# Patient Record
Sex: FEMALE | Race: WHITE | HISPANIC OR LATINO | Employment: UNEMPLOYED | URBAN - METROPOLITAN AREA
[De-identification: names, ages, dates, MRNs, and addresses within clinical notes are randomized per-mention and may not be internally consistent; named-entity substitution may affect disease eponyms.]

---

## 2018-11-16 ENCOUNTER — HOSPITAL ENCOUNTER (INPATIENT)
Facility: MEDICAL CENTER | Age: 33
LOS: 2 days | DRG: 392 | End: 2018-11-18
Attending: EMERGENCY MEDICINE | Admitting: FAMILY MEDICINE
Payer: COMMERCIAL

## 2018-11-16 ENCOUNTER — APPOINTMENT (OUTPATIENT)
Dept: RADIOLOGY | Facility: MEDICAL CENTER | Age: 33
DRG: 392 | End: 2018-11-16
Attending: EMERGENCY MEDICINE
Payer: COMMERCIAL

## 2018-11-16 DIAGNOSIS — K57.92 DIVERTICULITIS: ICD-10-CM

## 2018-11-16 LAB
ALBUMIN SERPL BCP-MCNC: 4.6 G/DL (ref 3.2–4.9)
ALBUMIN/GLOB SERPL: 1.3 G/DL
ALP SERPL-CCNC: 60 U/L (ref 30–99)
ALT SERPL-CCNC: 15 U/L (ref 2–50)
ANION GAP SERPL CALC-SCNC: 10 MMOL/L (ref 0–11.9)
APPEARANCE UR: ABNORMAL
AST SERPL-CCNC: 13 U/L (ref 12–45)
BACTERIA #/AREA URNS HPF: ABNORMAL /HPF
BASOPHILS # BLD AUTO: 0.4 % (ref 0–1.8)
BASOPHILS # BLD: 0.07 K/UL (ref 0–0.12)
BILIRUB SERPL-MCNC: 1 MG/DL (ref 0.1–1.5)
BILIRUB UR QL STRIP.AUTO: NEGATIVE
BUN SERPL-MCNC: 12 MG/DL (ref 8–22)
CALCIUM SERPL-MCNC: 9.7 MG/DL (ref 8.5–10.5)
CHLORIDE SERPL-SCNC: 103 MMOL/L (ref 96–112)
CO2 SERPL-SCNC: 23 MMOL/L (ref 20–33)
COLOR UR: YELLOW
CREAT SERPL-MCNC: 0.56 MG/DL (ref 0.5–1.4)
EKG IMPRESSION: NORMAL
EOSINOPHIL # BLD AUTO: 0.15 K/UL (ref 0–0.51)
EOSINOPHIL NFR BLD: 0.9 % (ref 0–6.9)
EPI CELLS #/AREA URNS HPF: ABNORMAL /HPF
ERYTHROCYTE [DISTWIDTH] IN BLOOD BY AUTOMATED COUNT: 40.4 FL (ref 35.9–50)
GLOBULIN SER CALC-MCNC: 3.5 G/DL (ref 1.9–3.5)
GLUCOSE SERPL-MCNC: 93 MG/DL (ref 65–99)
GLUCOSE UR STRIP.AUTO-MCNC: NEGATIVE MG/DL
HCG SERPL QL: NEGATIVE
HCT VFR BLD AUTO: 47.1 % (ref 37–47)
HGB BLD-MCNC: 16.4 G/DL (ref 12–16)
HYALINE CASTS #/AREA URNS LPF: ABNORMAL /LPF
IMM GRANULOCYTES # BLD AUTO: 0.11 K/UL (ref 0–0.11)
IMM GRANULOCYTES NFR BLD AUTO: 0.6 % (ref 0–0.9)
KETONES UR STRIP.AUTO-MCNC: NEGATIVE MG/DL
LACTATE BLD-SCNC: 0.9 MMOL/L (ref 0.5–2)
LEUKOCYTE ESTERASE UR QL STRIP.AUTO: NEGATIVE
LIPASE SERPL-CCNC: 37 U/L (ref 11–82)
LYMPHOCYTES # BLD AUTO: 2.54 K/UL (ref 1–4.8)
LYMPHOCYTES NFR BLD: 14.6 % (ref 22–41)
MCH RBC QN AUTO: 30 PG (ref 27–33)
MCHC RBC AUTO-ENTMCNC: 34.8 G/DL (ref 33.6–35)
MCV RBC AUTO: 86.3 FL (ref 81.4–97.8)
MICRO URNS: ABNORMAL
MONOCYTES # BLD AUTO: 0.92 K/UL (ref 0–0.85)
MONOCYTES NFR BLD AUTO: 5.3 % (ref 0–13.4)
NEUTROPHILS # BLD AUTO: 13.62 K/UL (ref 2–7.15)
NEUTROPHILS NFR BLD: 78.2 % (ref 44–72)
NITRITE UR QL STRIP.AUTO: NEGATIVE
NRBC # BLD AUTO: 0 K/UL
NRBC BLD-RTO: 0 /100 WBC
PH UR STRIP.AUTO: 6 [PH]
PLATELET # BLD AUTO: 331 K/UL (ref 164–446)
PMV BLD AUTO: 9.2 FL (ref 9–12.9)
POTASSIUM SERPL-SCNC: 3.8 MMOL/L (ref 3.6–5.5)
PROT SERPL-MCNC: 8.1 G/DL (ref 6–8.2)
PROT UR QL STRIP: NEGATIVE MG/DL
RBC # BLD AUTO: 5.46 M/UL (ref 4.2–5.4)
RBC # URNS HPF: ABNORMAL /HPF
RBC UR QL AUTO: NEGATIVE
SODIUM SERPL-SCNC: 136 MMOL/L (ref 135–145)
SP GR UR STRIP.AUTO: 1.02
UROBILINOGEN UR STRIP.AUTO-MCNC: 0.2 MG/DL
WBC # BLD AUTO: 17.4 K/UL (ref 4.8–10.8)
WBC #/AREA URNS HPF: ABNORMAL /HPF

## 2018-11-16 PROCEDURE — 84703 CHORIONIC GONADOTROPIN ASSAY: CPT

## 2018-11-16 PROCEDURE — 700111 HCHG RX REV CODE 636 W/ 250 OVERRIDE (IP): Performed by: STUDENT IN AN ORGANIZED HEALTH CARE EDUCATION/TRAINING PROGRAM

## 2018-11-16 PROCEDURE — 85025 COMPLETE CBC W/AUTO DIFF WBC: CPT

## 2018-11-16 PROCEDURE — 96375 TX/PRO/DX INJ NEW DRUG ADDON: CPT

## 2018-11-16 PROCEDURE — 700117 HCHG RX CONTRAST REV CODE 255: Performed by: EMERGENCY MEDICINE

## 2018-11-16 PROCEDURE — 700105 HCHG RX REV CODE 258: Performed by: STUDENT IN AN ORGANIZED HEALTH CARE EDUCATION/TRAINING PROGRAM

## 2018-11-16 PROCEDURE — 87040 BLOOD CULTURE FOR BACTERIA: CPT

## 2018-11-16 PROCEDURE — 80053 COMPREHEN METABOLIC PANEL: CPT

## 2018-11-16 PROCEDURE — 93005 ELECTROCARDIOGRAM TRACING: CPT | Performed by: EMERGENCY MEDICINE

## 2018-11-16 PROCEDURE — 93005 ELECTROCARDIOGRAM TRACING: CPT

## 2018-11-16 PROCEDURE — 700111 HCHG RX REV CODE 636 W/ 250 OVERRIDE (IP): Performed by: EMERGENCY MEDICINE

## 2018-11-16 PROCEDURE — 700102 HCHG RX REV CODE 250 W/ 637 OVERRIDE(OP): Performed by: FAMILY MEDICINE

## 2018-11-16 PROCEDURE — 83690 ASSAY OF LIPASE: CPT

## 2018-11-16 PROCEDURE — 700101 HCHG RX REV CODE 250: Performed by: STUDENT IN AN ORGANIZED HEALTH CARE EDUCATION/TRAINING PROGRAM

## 2018-11-16 PROCEDURE — 700105 HCHG RX REV CODE 258: Performed by: EMERGENCY MEDICINE

## 2018-11-16 PROCEDURE — A9270 NON-COVERED ITEM OR SERVICE: HCPCS | Performed by: FAMILY MEDICINE

## 2018-11-16 PROCEDURE — 99285 EMERGENCY DEPT VISIT HI MDM: CPT

## 2018-11-16 PROCEDURE — 83605 ASSAY OF LACTIC ACID: CPT

## 2018-11-16 PROCEDURE — 81001 URINALYSIS AUTO W/SCOPE: CPT

## 2018-11-16 PROCEDURE — 96365 THER/PROPH/DIAG IV INF INIT: CPT

## 2018-11-16 PROCEDURE — 770001 HCHG ROOM/CARE - MED/SURG/GYN PRIV*

## 2018-11-16 PROCEDURE — 36415 COLL VENOUS BLD VENIPUNCTURE: CPT

## 2018-11-16 PROCEDURE — 96367 TX/PROPH/DG ADDL SEQ IV INF: CPT

## 2018-11-16 PROCEDURE — 74177 CT ABD & PELVIS W/CONTRAST: CPT

## 2018-11-16 PROCEDURE — 700101 HCHG RX REV CODE 250: Performed by: EMERGENCY MEDICINE

## 2018-11-16 RX ORDER — ONDANSETRON 2 MG/ML
4 INJECTION INTRAMUSCULAR; INTRAVENOUS ONCE
Status: COMPLETED | OUTPATIENT
Start: 2018-11-16 | End: 2018-11-16

## 2018-11-16 RX ORDER — AMOXICILLIN 250 MG
2 CAPSULE ORAL 2 TIMES DAILY
Status: DISCONTINUED | OUTPATIENT
Start: 2018-11-16 | End: 2018-11-16

## 2018-11-16 RX ORDER — SODIUM CHLORIDE 9 MG/ML
INJECTION, SOLUTION INTRAVENOUS CONTINUOUS
Status: DISCONTINUED | OUTPATIENT
Start: 2018-11-16 | End: 2018-11-17 | Stop reason: ALTCHOICE

## 2018-11-16 RX ORDER — MORPHINE SULFATE 10 MG/ML
2-4 INJECTION, SOLUTION INTRAMUSCULAR; INTRAVENOUS EVERY 4 HOURS PRN
Status: DISCONTINUED | OUTPATIENT
Start: 2018-11-16 | End: 2018-11-18 | Stop reason: HOSPADM

## 2018-11-16 RX ORDER — MORPHINE SULFATE 10 MG/ML
4 INJECTION, SOLUTION INTRAMUSCULAR; INTRAVENOUS ONCE
Status: COMPLETED | OUTPATIENT
Start: 2018-11-16 | End: 2018-11-16

## 2018-11-16 RX ORDER — HYDROCODONE BITARTRATE AND ACETAMINOPHEN 5; 325 MG/1; MG/1
1-2 TABLET ORAL EVERY 4 HOURS PRN
Status: DISCONTINUED | OUTPATIENT
Start: 2018-11-16 | End: 2018-11-18 | Stop reason: HOSPADM

## 2018-11-16 RX ORDER — HEPARIN SODIUM 5000 [USP'U]/ML
5000 INJECTION, SOLUTION INTRAVENOUS; SUBCUTANEOUS EVERY 8 HOURS
Status: DISCONTINUED | OUTPATIENT
Start: 2018-11-16 | End: 2018-11-18 | Stop reason: HOSPADM

## 2018-11-16 RX ORDER — BISACODYL 10 MG
10 SUPPOSITORY, RECTAL RECTAL
Status: DISCONTINUED | OUTPATIENT
Start: 2018-11-16 | End: 2018-11-16

## 2018-11-16 RX ORDER — POLYETHYLENE GLYCOL 3350 17 G/17G
1 POWDER, FOR SOLUTION ORAL
Status: DISCONTINUED | OUTPATIENT
Start: 2018-11-16 | End: 2018-11-16

## 2018-11-16 RX ORDER — ONDANSETRON 2 MG/ML
4 INJECTION INTRAMUSCULAR; INTRAVENOUS EVERY 4 HOURS PRN
Status: DISCONTINUED | OUTPATIENT
Start: 2018-11-16 | End: 2018-11-18 | Stop reason: HOSPADM

## 2018-11-16 RX ADMIN — HYDROCODONE BITARTRATE AND ACETAMINOPHEN 2 TABLET: 5; 325 TABLET ORAL at 21:10

## 2018-11-16 RX ADMIN — METRONIDAZOLE 500 MG: 500 INJECTION, SOLUTION INTRAVENOUS at 23:53

## 2018-11-16 RX ADMIN — ONDANSETRON 4 MG: 2 INJECTION INTRAMUSCULAR; INTRAVENOUS at 13:25

## 2018-11-16 RX ADMIN — METRONIDAZOLE 500 MG: 500 INJECTION, SOLUTION INTRAVENOUS at 16:00

## 2018-11-16 RX ADMIN — IOHEXOL 80 ML: 350 INJECTION, SOLUTION INTRAVENOUS at 14:29

## 2018-11-16 RX ADMIN — MORPHINE SULFATE 4 MG: 10 INJECTION INTRAVENOUS at 13:25

## 2018-11-16 RX ADMIN — CEFTRIAXONE SODIUM 2 G: 2 INJECTION, POWDER, FOR SOLUTION INTRAMUSCULAR; INTRAVENOUS at 15:59

## 2018-11-16 RX ADMIN — MORPHINE SULFATE 4 MG: 10 INJECTION INTRAVENOUS at 19:01

## 2018-11-16 RX ADMIN — ONDANSETRON 4 MG: 2 INJECTION INTRAMUSCULAR; INTRAVENOUS at 21:07

## 2018-11-16 RX ADMIN — SODIUM CHLORIDE: 9 INJECTION, SOLUTION INTRAVENOUS at 18:58

## 2018-11-16 ASSESSMENT — PAIN SCALES - GENERAL
PAINLEVEL_OUTOF10: 8
PAINLEVEL_OUTOF10: 7
PAINLEVEL_OUTOF10: 4
PAINLEVEL_OUTOF10: 5
PAINLEVEL_OUTOF10: 2
PAINLEVEL_OUTOF10: 8

## 2018-11-16 NOTE — ED PROVIDER NOTES
ED Provider Note    Scribed for Braden Funk M.D. by Aki Chiang. 11/16/2018  12:34 PM    Primary Care Provider: Shemar Ramirez M.D.  Means of arrival: walk in  History limited by: none    CHIEF COMPLAINT  Chief Complaint   Patient presents with   • Abdominal Pain     generalized abd pain since last night       HPI  Mariza Jordan is a 33 y.o. female who presents to the ED complaining of gradually worsening generalized abdominal pain starting last night. She describes her pain as waxing and waning in quality from dull to sharp. Patient reports associated nausea, chest tightness, cough, mild shortness of breath, urinary frequency, right forearm rash, 100 pound weight loss in the last month. She administered Nyquil with no relief to her symptoms. Patient has a history of diverticulosis in march of 2017 but has not followed up with a GI specialist. Her last menstrual period was 3 months ago and has an IUD in place. She also reports a history of daily cigarette use, polycystic ovarian syndrome. Patient denies sore throat, fever, diarrhea, vomiting.     REVIEW OF SYSTEMS    CONSTITUTIONAL:  Denies fever, chills, weight gain/loss, or weakness.  EYES:  Denies photophobia or discharge.   ENT:  Denies sore throat, nose, or ear pain.  CARDIOVASCULAR: Chest tightness. Denies palpitations, or swelling.  RESPIRATORY:  Positive cough, shortness of breath  GI:  Positive abdominal pain, nausea, Denies vomiting, or diarrhea.  : Positive urinary frequency.  MUSCULOSKELETAL:  Denies weakness, joint swelling, or back pain.  SKIN: Positive rash.  No bruising.  NEUROLOGIC:  Denies headache, focal weakness, or numbness.  PSYCHIATRIC:  Denies depression.    PAST MEDICAL HISTORY  Past Medical History:   Diagnosis Date   • Diverticulosis    • PCOS (polycystic ovarian syndrome)        FAMILY HISTORY  History reviewed. No pertinent family history.    SOCIAL HISTORY   reports that she has been smoking Cigarettes.  She has  "been smoking about 0.15 packs per day. She has never used smokeless tobacco. She reports that she uses drugs, including Inhaled. She reports that she does not drink alcohol.    SURGICAL HISTORY  Past Surgical History:   Procedure Laterality Date   • OTHER ABDOMINAL SURGERY         CURRENT MEDICATIONS  Home Medications     Reviewed by Ronnell Azar R.N. (Registered Nurse) on 11/16/18 at 1112  Med List Status: Partial   Medication Last Dose Status        Patient Raz Taking any Medications                       ALLERGIES  No Known Allergies    PHYSICAL EXAM  VITAL SIGNS: /96   Pulse 93   Temp 36.3 °C (97.3 °F) (Temporal)   Resp 14   Ht 1.626 m (5' 4\")   Wt 116.7 kg (257 lb 4.4 oz)   SpO2 98%   BMI 44.16 kg/m²      Constitutional: Patient is awake and alert. No acute respiratory distress. Well developed, Well nourished, Non-toxic appearance.  HENT: Normocephalic, Atraumatic, Bilateral external ears normal, Oropharynx pink dry with no exudates, Nose patent.  Eyes: No discharge.   Neck:  Supple no nuchal rigidity, no thyromegaly or mass. Non-tender  Lymphatic: No supraclavicular lymph nodes.   Cardiovascular: Heart is regular rate and rhythm no murmur, rub or thrill.   Thorax & Lungs: Chest is symmetrical, with good breath sounds. No wheezing or crackles. No respiratory distress, No chest tenderness.   Abdomen: Soft, no hepatosplenomegaly there is no guarding or rebound, No masses, No pulsatile masses. Obese. Diffusely tender in all quadrants but minimally tender in right upper quadrant.   Skin: Warm, Dry, no petechia, purpura, or rash.  Right palm lesion.   Back: Non tender with palpation, No CVA tenderness.   Extremities: No edema. Non tender.   Musculoskeletal: Good range of motion to upper lower extremities   neurologic: Alert & oriented .  Strength is symmetrical in the upper and lower extremities   Psychiatric: Normal affect    EKG  1107- 13 Lead EKG interpreted by me shows {normal sinus rhythm at " 72.  . First degree AV block. Axis QRS 54, No ST elevations. No old EKG for comparison.    LABS  Results for orders placed or performed during the hospital encounter of 11/16/18   CBC WITH DIFFERENTIAL   Result Value Ref Range    WBC 17.4 (H) 4.8 - 10.8 K/uL    RBC 5.46 (H) 4.20 - 5.40 M/uL    Hemoglobin 16.4 (H) 12.0 - 16.0 g/dL    Hematocrit 47.1 (H) 37.0 - 47.0 %    MCV 86.3 81.4 - 97.8 fL    MCH 30.0 27.0 - 33.0 pg    MCHC 34.8 33.6 - 35.0 g/dL    RDW 40.4 35.9 - 50.0 fL    Platelet Count 331 164 - 446 K/uL    MPV 9.2 9.0 - 12.9 fL    Neutrophils-Polys 78.20 (H) 44.00 - 72.00 %    Lymphocytes 14.60 (L) 22.00 - 41.00 %    Monocytes 5.30 0.00 - 13.40 %    Eosinophils 0.90 0.00 - 6.90 %    Basophils 0.40 0.00 - 1.80 %    Immature Granulocytes 0.60 0.00 - 0.90 %    Nucleated RBC 0.00 /100 WBC    Neutrophils (Absolute) 13.62 (H) 2.00 - 7.15 K/uL    Lymphs (Absolute) 2.54 1.00 - 4.80 K/uL    Monos (Absolute) 0.92 (H) 0.00 - 0.85 K/uL    Eos (Absolute) 0.15 0.00 - 0.51 K/uL    Baso (Absolute) 0.07 0.00 - 0.12 K/uL    Immature Granulocytes (abs) 0.11 0.00 - 0.11 K/uL    NRBC (Absolute) 0.00 K/uL   COMP METABOLIC PANEL   Result Value Ref Range    Sodium 136 135 - 145 mmol/L    Potassium 3.8 3.6 - 5.5 mmol/L    Chloride 103 96 - 112 mmol/L    Co2 23 20 - 33 mmol/L    Anion Gap 10.0 0.0 - 11.9    Glucose 93 65 - 99 mg/dL    Bun 12 8 - 22 mg/dL    Creatinine 0.56 0.50 - 1.40 mg/dL    Calcium 9.7 8.5 - 10.5 mg/dL    AST(SGOT) 13 12 - 45 U/L    ALT(SGPT) 15 2 - 50 U/L    Alkaline Phosphatase 60 30 - 99 U/L    Total Bilirubin 1.0 0.1 - 1.5 mg/dL    Albumin 4.6 3.2 - 4.9 g/dL    Total Protein 8.1 6.0 - 8.2 g/dL    Globulin 3.5 1.9 - 3.5 g/dL    A-G Ratio 1.3 g/dL   LIPASE   Result Value Ref Range    Lipase 37 11 - 82 U/L   URINALYSIS,CULTURE IF INDICATED   Result Value Ref Range    Color Yellow     Character Cloudy (A)     Specific Gravity 1.025 <1.035    Ph 6.0 5.0 - 8.0    Glucose Negative Negative mg/dL    Ketones  Negative Negative mg/dL    Protein Negative Negative mg/dL    Bilirubin Negative Negative    Urobilinogen, Urine 0.2 Negative    Nitrite Negative Negative    Leukocyte Esterase Negative Negative    Occult Blood Negative Negative    Micro Urine Req Microscopic    URINE MICROSCOPIC (W/UA)   Result Value Ref Range    WBC 2-5 /hpf    RBC 10-20 (A) /hpf    Bacteria Moderate (A) None /hpf    Epithelial Cells Moderate (A) /hpf    Hyaline Cast 0-2 /lpf   ESTIMATED GFR   Result Value Ref Range    GFR If African American >60 >60 mL/min/1.73 m 2    GFR If Non African American >60 >60 mL/min/1.73 m 2   HCG QUAL SERUM   Result Value Ref Range    Beta-Hcg Qualitative Serum Negative Negative   LACTIC ACID   Result Value Ref Range    Lactic Acid 0.9 0.5 - 2.0 mmol/L   EKG (NOW)   Result Value Ref Range              All labs reviewed by me.    RADIOLOGY/PROCEDURES  CT-ABDOMEN-PELVIS WITH   Final Result         1. Findings suggestive of sigmoid diverticulitis/epiploic appendagitis.   2. Recommend colonoscopy upon resolution of acute symptoms to exclude an underlying colonic lesion.      The radiologist's interpretations of all radiological studies have been reviewed by me.     COURSE & MEDICAL DECISION MAKING  Pertinent Labs & Imaging studies reviewed. (See chart for details)    Differential diagnoses include but are not limited to ectopic pregnancy, ovarian torsion, intussusception, volvulus, gallbladder disease, appendicitis, colitis.    12:34 PM - Patient seen and examined at bedside. Patient will be medicated with Morphine 4 mg, Zofran 4 mg for her symptoms. Ordered CT abdomen, HCG qual serum, Urine microscope, CBC with differential, CMP, Lipase, Urinalysis to evaluate.    Discussed the case with the [facility] and they will come in to see the patient.  I have already given the patient Rocephin and Flagyl IV.    Patient was given IV fluids for her dehydration abdominal pain and surgical abdomen.  She will need to  continue having IV fluids    Decision Making  Patient who came with abdominal pain.  Certainly the differential is wide and broad.  The workup reveals that she has diverticulitis and epiploic appendagitis.  Patient will be admitted to the hospital in the care of the [facility] service with IV antibiotics IV fluid continued.  Again she will need to be watched carefully for further care and evaluation        FINAL IMPRESSION  1. Diverticulitis    2.  Epiploic appendage   3.  Dehydration    PLAN  1.  Admission [facility] service  2.  Continue IV fluids antibiotics       Aki FORD (Michaelibaydin), am scribing for, and in the presence of, Braden Funk M.D..    Electronically signed by: Aki Chiang (Michaelibaydin), 11/16/2018    IBraden M.D. personally performed the services described in this documentation, as scribed by Aki Chiang in my presence, and it is both accurate and complete.    The note accurately reflects work and decisions made by me.  Braden Funk  11/16/2018  4:40 PM

## 2018-11-16 NOTE — ED TRIAGE NOTES
"Chief Complaint   Patient presents with   • Abdominal Pain     generalized abd pain since last night     Pt ambulatory to triage for above. States she was dx'd with diverticulosis 3/2017, hasn't followed up with GI. +nausea. NAD noted. VSS.     Pt returned to lobby. Educated on triage process and to inform staff of any changes.     /96   Pulse 93   Temp 36.3 °C (97.3 °F) (Temporal)   Resp 14   Ht 1.626 m (5' 4\")   Wt 116.7 kg (257 lb 4.4 oz)   SpO2 98%   BMI 44.16 kg/m²     "

## 2018-11-17 LAB
ALBUMIN SERPL BCP-MCNC: 3.6 G/DL (ref 3.2–4.9)
ALBUMIN/GLOB SERPL: 1.2 G/DL
ALP SERPL-CCNC: 50 U/L (ref 30–99)
ALT SERPL-CCNC: 10 U/L (ref 2–50)
ANION GAP SERPL CALC-SCNC: 6 MMOL/L (ref 0–11.9)
AST SERPL-CCNC: 9 U/L (ref 12–45)
BILIRUB SERPL-MCNC: 1.2 MG/DL (ref 0.1–1.5)
BUN SERPL-MCNC: 11 MG/DL (ref 8–22)
CALCIUM SERPL-MCNC: 8.6 MG/DL (ref 8.5–10.5)
CHLORIDE SERPL-SCNC: 106 MMOL/L (ref 96–112)
CO2 SERPL-SCNC: 23 MMOL/L (ref 20–33)
CREAT SERPL-MCNC: 0.54 MG/DL (ref 0.5–1.4)
ERYTHROCYTE [DISTWIDTH] IN BLOOD BY AUTOMATED COUNT: 41.8 FL (ref 35.9–50)
GLOBULIN SER CALC-MCNC: 2.9 G/DL (ref 1.9–3.5)
GLUCOSE SERPL-MCNC: 100 MG/DL (ref 65–99)
HCT VFR BLD AUTO: 39.7 % (ref 37–47)
HGB BLD-MCNC: 13.5 G/DL (ref 12–16)
MCH RBC QN AUTO: 29.9 PG (ref 27–33)
MCHC RBC AUTO-ENTMCNC: 34 G/DL (ref 33.6–35)
MCV RBC AUTO: 88 FL (ref 81.4–97.8)
PLATELET # BLD AUTO: 258 K/UL (ref 164–446)
PMV BLD AUTO: 9.3 FL (ref 9–12.9)
POTASSIUM SERPL-SCNC: 3.4 MMOL/L (ref 3.6–5.5)
PROT SERPL-MCNC: 6.5 G/DL (ref 6–8.2)
RBC # BLD AUTO: 4.51 M/UL (ref 4.2–5.4)
SODIUM SERPL-SCNC: 135 MMOL/L (ref 135–145)
WBC # BLD AUTO: 13.1 K/UL (ref 4.8–10.8)

## 2018-11-17 PROCEDURE — 80053 COMPREHEN METABOLIC PANEL: CPT

## 2018-11-17 PROCEDURE — 700102 HCHG RX REV CODE 250 W/ 637 OVERRIDE(OP): Performed by: STUDENT IN AN ORGANIZED HEALTH CARE EDUCATION/TRAINING PROGRAM

## 2018-11-17 PROCEDURE — 700102 HCHG RX REV CODE 250 W/ 637 OVERRIDE(OP): Performed by: FAMILY MEDICINE

## 2018-11-17 PROCEDURE — 90732 PPSV23 VACC 2 YRS+ SUBQ/IM: CPT | Performed by: FAMILY MEDICINE

## 2018-11-17 PROCEDURE — A9270 NON-COVERED ITEM OR SERVICE: HCPCS | Performed by: FAMILY MEDICINE

## 2018-11-17 PROCEDURE — 36415 COLL VENOUS BLD VENIPUNCTURE: CPT

## 2018-11-17 PROCEDURE — 700111 HCHG RX REV CODE 636 W/ 250 OVERRIDE (IP): Performed by: FAMILY MEDICINE

## 2018-11-17 PROCEDURE — 85027 COMPLETE CBC AUTOMATED: CPT

## 2018-11-17 PROCEDURE — 3E02340 INTRODUCTION OF INFLUENZA VACCINE INTO MUSCLE, PERCUTANEOUS APPROACH: ICD-10-PCS | Performed by: FAMILY MEDICINE

## 2018-11-17 PROCEDURE — 700111 HCHG RX REV CODE 636 W/ 250 OVERRIDE (IP): Performed by: STUDENT IN AN ORGANIZED HEALTH CARE EDUCATION/TRAINING PROGRAM

## 2018-11-17 PROCEDURE — 700101 HCHG RX REV CODE 250: Performed by: STUDENT IN AN ORGANIZED HEALTH CARE EDUCATION/TRAINING PROGRAM

## 2018-11-17 PROCEDURE — 90471 IMMUNIZATION ADMIN: CPT

## 2018-11-17 PROCEDURE — 770001 HCHG ROOM/CARE - MED/SURG/GYN PRIV*

## 2018-11-17 PROCEDURE — 90686 IIV4 VACC NO PRSV 0.5 ML IM: CPT | Performed by: FAMILY MEDICINE

## 2018-11-17 PROCEDURE — 700105 HCHG RX REV CODE 258: Performed by: STUDENT IN AN ORGANIZED HEALTH CARE EDUCATION/TRAINING PROGRAM

## 2018-11-17 PROCEDURE — A9270 NON-COVERED ITEM OR SERVICE: HCPCS | Performed by: STUDENT IN AN ORGANIZED HEALTH CARE EDUCATION/TRAINING PROGRAM

## 2018-11-17 RX ORDER — POTASSIUM CHLORIDE 20 MEQ/1
40 TABLET, EXTENDED RELEASE ORAL ONCE
Status: COMPLETED | OUTPATIENT
Start: 2018-11-17 | End: 2018-11-17

## 2018-11-17 RX ADMIN — HYDROCODONE BITARTRATE AND ACETAMINOPHEN 2 TABLET: 5; 325 TABLET ORAL at 13:01

## 2018-11-17 RX ADMIN — CEFTRIAXONE SODIUM 2 G: 2 INJECTION, POWDER, FOR SOLUTION INTRAMUSCULAR; INTRAVENOUS at 15:36

## 2018-11-17 RX ADMIN — HYDROCODONE BITARTRATE AND ACETAMINOPHEN 2 TABLET: 5; 325 TABLET ORAL at 07:45

## 2018-11-17 RX ADMIN — INFLUENZA A VIRUS A/MICHIGAN/45/2015 X-275 (H1N1) ANTIGEN (FORMALDEHYDE INACTIVATED), INFLUENZA A VIRUS A/SINGAPORE/INFIMH-16-0019/2016 IVR-186 (H3N2) ANTIGEN (FORMALDEHYDE INACTIVATED), INFLUENZA B VIRUS B/PHUKET/3073/2013 ANTIGEN (FORMALDEHYDE INACTIVATED), AND INFLUENZA B VIRUS B/MARYLAND/15/2016 BX-69A ANTIGEN (FORMALDEHYDE INACTIVATED) 0.5 ML: 15; 15; 15; 15 INJECTION, SUSPENSION INTRAMUSCULAR at 13:01

## 2018-11-17 RX ADMIN — PNEUMOCOCCAL VACCINE POLYVALENT 25 MCG
25; 25; 25; 25; 25; 25; 25; 25; 25; 25; 25; 25; 25; 25; 25; 25; 25; 25; 25; 25; 25; 25; 25 INJECTION, SOLUTION INTRAMUSCULAR; SUBCUTANEOUS at 13:00

## 2018-11-17 RX ADMIN — METRONIDAZOLE 500 MG: 500 INJECTION, SOLUTION INTRAVENOUS at 05:12

## 2018-11-17 RX ADMIN — ONDANSETRON 4 MG: 2 INJECTION INTRAMUSCULAR; INTRAVENOUS at 13:00

## 2018-11-17 RX ADMIN — POTASSIUM CHLORIDE 40 MEQ: 1500 TABLET, EXTENDED RELEASE ORAL at 07:45

## 2018-11-17 RX ADMIN — HYDROCODONE BITARTRATE AND ACETAMINOPHEN 2 TABLET: 5; 325 TABLET ORAL at 21:29

## 2018-11-17 RX ADMIN — METRONIDAZOLE 500 MG: 500 INJECTION, SOLUTION INTRAVENOUS at 13:59

## 2018-11-17 RX ADMIN — SODIUM CHLORIDE: 9 INJECTION, SOLUTION INTRAVENOUS at 03:30

## 2018-11-17 RX ADMIN — METRONIDAZOLE 500 MG: 500 INJECTION, SOLUTION INTRAVENOUS at 21:29

## 2018-11-17 RX ADMIN — HEPARIN SODIUM 5000 UNITS: 5000 INJECTION, SOLUTION INTRAVENOUS; SUBCUTANEOUS at 13:59

## 2018-11-17 RX ADMIN — HYDROCODONE BITARTRATE AND ACETAMINOPHEN 2 TABLET: 5; 325 TABLET ORAL at 01:46

## 2018-11-17 RX ADMIN — ONDANSETRON 4 MG: 2 INJECTION INTRAMUSCULAR; INTRAVENOUS at 09:07

## 2018-11-17 RX ADMIN — ONDANSETRON 4 MG: 2 INJECTION INTRAMUSCULAR; INTRAVENOUS at 18:19

## 2018-11-17 RX ADMIN — HEPARIN SODIUM 5000 UNITS: 5000 INJECTION, SOLUTION INTRAVENOUS; SUBCUTANEOUS at 21:30

## 2018-11-17 ASSESSMENT — COGNITIVE AND FUNCTIONAL STATUS - GENERAL
SUGGESTED CMS G CODE MODIFIER MOBILITY: CH
MOBILITY SCORE: 24
DAILY ACTIVITIY SCORE: 24
SUGGESTED CMS G CODE MODIFIER DAILY ACTIVITY: CH

## 2018-11-17 ASSESSMENT — LIFESTYLE VARIABLES
EVER HAD A DRINK FIRST THING IN THE MORNING TO STEADY YOUR NERVES TO GET RID OF A HANGOVER: NO
HAVE YOU EVER FELT YOU SHOULD CUT DOWN ON YOUR DRINKING: NO
EVER_SMOKED: YES
EVER FELT BAD OR GUILTY ABOUT YOUR DRINKING: NO
TOTAL SCORE: 0
TOTAL SCORE: 0
ALCOHOL_USE: NO
HAVE PEOPLE ANNOYED YOU BY CRITICIZING YOUR DRINKING: NO
TOTAL SCORE: 0
CONSUMPTION TOTAL: INCOMPLETE

## 2018-11-17 ASSESSMENT — PAIN SCALES - GENERAL
PAINLEVEL_OUTOF10: 7
PAINLEVEL_OUTOF10: 4
PAINLEVEL_OUTOF10: 7
PAINLEVEL_OUTOF10: 7
PAINLEVEL_OUTOF10: 4
PAINLEVEL_OUTOF10: 7
PAINLEVEL_OUTOF10: 7
PAINLEVEL_OUTOF10: 4

## 2018-11-17 ASSESSMENT — PATIENT HEALTH QUESTIONNAIRE - PHQ9
3. TROUBLE FALLING OR STAYING ASLEEP OR SLEEPING TOO MUCH: NEARLY EVERY DAY
5. POOR APPETITE OR OVEREATING: SEVERAL DAYS
6. FEELING BAD ABOUT YOURSELF - OR THAT YOU ARE A FAILURE OR HAVE LET YOURSELF OR YOUR FAMILY DOWN: SEVERAL DAYS
4. FEELING TIRED OR HAVING LITTLE ENERGY: NEARLY EVERY DAY
SUM OF ALL RESPONSES TO PHQ QUESTIONS 1-9: 11
1. LITTLE INTEREST OR PLEASURE IN DOING THINGS: SEVERAL DAYS
8. MOVING OR SPEAKING SO SLOWLY THAT OTHER PEOPLE COULD HAVE NOTICED. OR THE OPPOSITE, BEING SO FIGETY OR RESTLESS THAT YOU HAVE BEEN MOVING AROUND A LOT MORE THAN USUAL: NOT AT ALL
7. TROUBLE CONCENTRATING ON THINGS, SUCH AS READING THE NEWSPAPER OR WATCHING TELEVISION: SEVERAL DAYS
9. THOUGHTS THAT YOU WOULD BE BETTER OFF DEAD, OR OF HURTING YOURSELF: NOT AT ALL
2. FEELING DOWN, DEPRESSED, IRRITABLE, OR HOPELESS: SEVERAL DAYS
SUM OF ALL RESPONSES TO PHQ9 QUESTIONS 1 AND 2: 2

## 2018-11-17 ASSESSMENT — COPD QUESTIONNAIRES
DO YOU EVER COUGH UP ANY MUCUS OR PHLEGM?: YES, A FEW DAYS A WEEK OR MONTH
DURING THE PAST 4 WEEKS HOW MUCH DID YOU FEEL SHORT OF BREATH: MOST  OR ALL OF THE TIME
HAVE YOU SMOKED AT LEAST 100 CIGARETTES IN YOUR ENTIRE LIFE: YES
IN THE PAST 12 MONTHS DO YOU DO LESS THAN YOU USED TO BECAUSE OF YOUR BREATHING PROBLEMS: STRONGLY AGREE

## 2018-11-17 NOTE — H&P
"   FAMILY MEDICINE HISTORY AND PHYSICAL       PATIENT ID:  NAME:  Mariza Jordan  MRN:               [id number]  YOB: 1985    Date of Admission: 11/16/2018      Attending: Dr. Kiran  Senior Resident: Manpreet Linares M.D. (PGY-2)  [place]hi Resident: Laura Banegas M.D. (PGY-1)    Primary Care Physician:  [facility], Brandy Cisneros    CC:  Abdominal pain    HPI: Mariza Jordan is a 33 y.o. F with a significant history of depression and anxiety, PCOS, KORTNEY, and diverticulitis who presented with worsening abdominal pain throughout the day yesterday and overnight.  She states that she was diagnosed with diverticulitis in the past at [facility], however is unsure of when the diagnosis was made.  She states it's \"been awhile\" since she had a flare, the last being several months ago.  She states that she had some \"leakage\" of stool earlier in the week that was abnormal for her with a few episodes of vomiting.  She denies any blood in the stool or continued diarrhea at this time.  She denies any fevers/chills.  She has had a decreased appetite today but has been able to tolerate some oral fluids.  She was referred to GI by her PCP however has not followed up with scheduling an appointment.  She has not had a colonoscopy after diagnosis of diverticulitis.  She denies any family history of colon cancer that she knows of but she does not know about her biological family history well.    ED Course: CBC with leukocytosis and left shift, CT abd/pelvis suggestive of possible diverticulitis vs epiploic appendigitis.  UA with mod epithelial cells.  Flagyl and Rocephin were given as well as IV morphine for pain control.    REVIEW OF SYSTEMS:   Constitutional: No fevers, no recent unintended weight loss, has lost 100+ lbs intentionally over the last few years  HEENT: No HA, No changes in vision, no recent loss of hearing   CVS: No Chest pain, no orthopnea   Pulm: No SOB, No dyspnea   GI: No N/V, No Diarrhea, " no blood in stool, + abd pain  : No dysuria, no urgency/frequence, no hematuria   MSK: no myalgia, no joint swelling or pain   Skin: No recent rashes                PAST MEDICAL HISTORY:  Past Medical History:   Diagnosis Date   • Diverticulosis    • PCOS (polycystic ovarian syndrome)        PAST SURGICAL HISTORY:  Past Surgical History:   Procedure Laterality Date   • OTHER ABDOMINAL SURGERY         FAMILY HISTORY:  History reviewed. No pertinent family history.    SOCIAL HISTORY:   Pt lives in [place] with  and children.  Smoking 1 pack per week tobacco, regular marijuana use  Etoh use denies  Drug use marijuana  IVDA none    DIET:   Orders Placed This Encounter   Procedures   • Diet NPO     Standing Status:   Standing     Number of Occurrences:   1     Order Specific Question:   Restrict to:     Answer:   Ice Chips [2]       ALLERGIES:  No Known Allergies    OUTPATIENT MEDICATIONS:    Current Facility-Administered Medications:   •  metroNIDAZOLE (FLAGYL) IVPB 500 mg, 500 mg, Intravenous, Once, Braden Funk M.D., Last Rate: 100 mL/hr at 11/16/18 1600, 500 mg at 11/16/18 1600  •  NS infusion, , Intravenous, Continuous, Avis-Treasure Blayne, D.O.  •  heparin injection 5,000 Units, 5,000 Units, Subcutaneous, Q8HRS, Avis-Treasure Blayne, D.O.  •  [START ON 11/17/2018] cefTRIAXone (ROCEPHIN) 2 g in  mL IVPB, 2 g, Intravenous, Q24HR, Avis-Treasure Blayne, D.O.  •  metroNIDAZOLE (FLAGYL) IVPB 500 mg, 500 mg, Intravenous, Q8HRS, Avis-Treasure Blayne, D.O.  •  ondansetron (ZOFRAN) syringe/vial injection 4 mg, 4 mg, Intravenous, Q4HRS PRN, Avis-Treasure Blayne, D.O.  •  morphine (pf) 10 mg/ml 10 MG/ML injection 2-4 mg, 2-4 mg, Intravenous, Q4HRS PRN, Avis-Treasure Blayne, D.O.    Current Outpatient Prescriptions:   •  levonorgestrel (MIRENA, 52 MG,) 20 MCG/24HR IUD, 1 Each by Intrauterine route Once., Disp: , Rfl:     PHYSICAL EXAM:  Vitals:    11/16/18 1100 11/16/18 1109   BP: 152/96    Pulse: 93    Resp: 14  "   Temp: 36.3 °C (97.3 °F)    TempSrc: Temporal    SpO2: 98%    Weight:  116.7 kg (257 lb 4.4 oz)   Height:  1.626 m (5' 4\")   , Temp (24hrs), Av.3 °C (97.3 °F), Min:36.3 °C (97.3 °F), Max:36.3 °C (97.3 °F)  , Pulse Oximetry: 98 %, O2 Delivery: None (Room Air)    General: Pt resting in NAD, cooperative   Skin:  Pink, warm and dry.  No rashes  HEENT: PERRL. EOMI. No nasal discharge. Oropharynx nonerythematous without exudate/plaques, 2+ tonsillar edema  Neck:  Supple without lymphadenopathy or rigidity. No JVD   Lungs:  Symmetrical.  CTAB with no W/R/R.  Good air movement   Cardiovascular:  S1/S2 RRR without M/R/G.  Abdomen:  Abdomen is soft, + diffuse TTP worse in RUQ, no rebound tenderness, no masses, neg Taberg and McBurneys  Extremities: 2+ pulses in all extremities. No C/C/E   CNS:  Muscle tone is normal. Cranial nerves II-XII grossly intact.     LAB TESTS:   Recent Labs      18   1144   WBC  17.4*   RBC  5.46*   HEMOGLOBIN  16.4*   HEMATOCRIT  47.1*   MCV  86.3   MCH  30.0   RDW  40.4   PLATELETCT  331   MPV  9.2   NEUTSPOLYS  78.20*   LYMPHOCYTES  14.60*   MONOCYTES  5.30   EOSINOPHILS  0.90   BASOPHILS  0.40         Recent Labs      18   1144   SODIUM  136   POTASSIUM  3.8   CHLORIDE  103   CO2  23   BUN  12   CREATININE  0.56   CALCIUM  9.7   ALBUMIN  4.6       CULTURES:   Results     Procedure Component Value Units Date/Time    BLOOD CULTURE [[id number]] Collected:  18 1303    Order Status:  Completed Specimen:  Blood from Peripheral Updated:  18    Narrative:       Per Hospital Policy: Only change Specimen Src: to \"Line\" if  specified by physician order.    BLOOD CULTURE [[id number]] Collected:  18 1303    Order Status:  Completed Specimen:  Blood from Peripheral Updated:  18    Narrative:       Per Hospital Policy: Only change Specimen Src: to \"Line\" if  specified by physician order.    URINALYSIS,CULTURE IF INDICATED [[id number]]  (Abnormal) Collected:  " 11/16/18 1120    Order Status:  Completed Specimen:  Urine Updated:  11/16/18 1138     Color Yellow     Character Cloudy (A)     Specific Gravity 1.025     Ph 6.0     Glucose Negative mg/dL      Ketones Negative mg/dL      Protein Negative mg/dL      Bilirubin Negative     Urobilinogen, Urine 0.2     Nitrite Negative     Leukocyte Esterase Negative     Occult Blood Negative     Micro Urine Req Microscopic          IMAGES:  CT abd/pelvis:  1. Findings suggestive of sigmoid diverticulitis/epiploic appendagitis.  2. Recommend colonoscopy upon resolution of acute symptoms to exclude an underlying colonic lesion.    CONSULTS:   none    ASSESSMENT/PLAN: 33 y.o. female admitted for worsening abdominal pain and diverticulitis.    # Diverticulitis vs Epiploic Appendagitis   # Leukocytosis  Abdominal pain, worsening since yesterday, no associated fevers/chills, no nausea however poor PO intake  CBC with elevated WBCs and left shift  CT abd/pelvis suggestive of sigmoid diverticulitis/epiploic appendagitis with recs for colonoscopy to rule out underlying lesion.  Reviewed images with radiologist and the presentation appears slightly abnormal for what diverticulitis usually looks like with increased mural thickening and diffuse inflammation that might be more suggestive of a neoplastic process.  Flagyl and rocephin started in ED with IV pain control  Plan:  - Continue 2 g Rocephin q 24 and 500 mg Flagyl q 8  - 2-4 mg IV morphine PRN pain  - NS @ 150/hr overnight, can d/c tomorrow when able to tolerate PO  - Bowel rest, sips and ice chips ok, will likely advance to clear liquids tomorrow  - Pt was referred to [facility] as an outpatient but has not scheduled appointment, will need close follow up on dc  - CBC in am    #FEN/GI  - NPO, will advance to CLD tomorrow as tolerated    # KORTNEY  Monitor vitals overnight, supplemental O2 if needed  Pt has referral to pulmonology/sleep medicine already as outpatient    #Dispo  Inpatient for IV  antibiotics and diverticulitis    #Core Measures   VTE PPx: subq heparin  Abx:Rocephin, Flagyl  Fluids: NS @ 150/hr  Lines and Tube:PIV  Diet: NPO  Code Status: Full      Brandy Cisneros   PGY-2  [facility]   [phone]

## 2018-11-17 NOTE — CARE PLAN
Problem: Venous Thromboembolism (VTW)/Deep Vein Thrombosis (DVT) Prevention:  Goal: Patient will participate in Venous Thrombosis (VTE)/Deep Vein Thrombosis (DVT)Prevention Measures  Outcome: PROGRESSING AS EXPECTED  Refusing heparin.  Ambulation and mobilization encouraged.  Up to BR multiple times.    Problem: Pain Management  Goal: Pain level will decrease to patient's comfort goal  Outcome: PROGRESSING AS EXPECTED  Norco PRN ordered and administered.  Ice refused.  Mobilization encouraged.

## 2018-11-17 NOTE — DIETARY
NUTRITION SERVICES: BMI - Pt with BMI >40 (=44.16). Weight loss counseling not appropriate in acute care setting. RECOMMEND - Referral to outpatient nutrition services for weight management after D/C.

## 2018-11-17 NOTE — PROGRESS NOTES
Received report from evening RN.  Assumed care of patient.  Patient A&Ox4.  C/O pain in abdomen 7/10, medicated per MAR.  Slight complaints of nausea, no vomiting.  MD at bedside.  Plan to start clear liquid diet.  +flatus, +BM 11/16/18.  Ambulating with steady gait and SBA.  +void.  Plan of care discussed.  Call light within reach.  Bed in lowest position.

## 2018-11-17 NOTE — CARE PLAN
Problem: Safety  Goal: Will remain free from injury  Outcome: PROGRESSING AS EXPECTED  Patient educated to call for assistance before getting out of bed.  Call light within reach.  Bed in lowest position.      Problem: Pain Management  Goal: Pain level will decrease to patient's comfort goal  Outcome: PROGRESSING AS EXPECTED  Patient having complaints of pain 7/10, medicated per MAR.

## 2018-11-17 NOTE — PROGRESS NOTES
[facility] FAMILY MEDICINE PROGRESS NOTE     Attending: Dr. Kiran  Resident: Susanna Claros (PGY-1)  PATIENT: Mariza Jordan; [id number]; 1985    ID: 33 y.o. female admitted for diverticulitis.    SUBJECTIVE: No acute events overnight.  Episode of 10/10 abdominal pain overnight.  This AM 7/10. No BMs.  Ready to try food.  Verbalized understanding of the importance of following up with GI as an outpatient and completing colonoscopy.      OBJECTIVE:     Vitals:    11/16/18 1530 11/16/18 1835 11/16/18 2355 11/17/18 0230   BP:  110/69 128/79 (!) 98/62   Pulse: 83 74 76 75   Resp:  17 18 17   Temp:  36.1 °C (97 °F) 36.8 °C (98.2 °F) 36.3 °C (97.4 °F)   TempSrc:  Temporal Temporal Temporal   SpO2: 94% 95% 95% 95%   Weight:       Height:           Intake/Output Summary (Last 24 hours) at 11/17/18 0659  Last data filed at 11/17/18 0230   Gross per 24 hour   Intake              100 ml   Output                0 ml   Net              100 ml       PE:  General: obese female   HEENT: NC/AT. PERRLA.   Cardiovascular: RRR with no M/R/G.  Respiratory: Symmetrical chest. CTAB with no adventitious breath sounds   Abdomen: soft, hypoactive bowel sounds, mildly tender to palpation of lower quadrants   EXT:  No peripheral edema     LABS:  Recent Labs      11/16/18   1144  11/17/18   0348   WBC  17.4*  13.1*   RBC  5.46*  4.51   HEMOGLOBIN  16.4*  13.5   HEMATOCRIT  47.1*  39.7   MCV  86.3  88.0   MCH  30.0  29.9   RDW  40.4  41.8   PLATELETCT  331  258   MPV  9.2  9.3   NEUTSPOLYS  78.20*   --    LYMPHOCYTES  14.60*   --    MONOCYTES  5.30   --    EOSINOPHILS  0.90   --    BASOPHILS  0.40   --      Recent Labs      11/16/18   1144  11/17/18   0348   SODIUM  136  135   POTASSIUM  3.8  3.4*   CHLORIDE  103  106   CO2  23  23   BUN  12  11   CREATININE  0.56  0.54   CALCIUM  9.7  8.6   ALBUMIN  4.6  3.6     Estimated GFR/CRCL = Estimated Creatinine Clearance: 187.7 mL/min (by C-G formula based on SCr of 0.54 mg/dL).  Recent Labs       11/16/18   1144  11/17/18   0348   GLUCOSE  93  100*     Recent Labs      11/16/18   1144  11/17/18   0348   ASTSGOT  13  9*   ALTSGPT  15  10   TBILIRUBIN  1.0  1.2   ALKPHOSPHAT  60  50   GLOBULIN  3.5  2.9             No results for input(s): INR, APTT, FIBRINOGEN in the last 72 hours.    Invalid input(s): DIMER    MICROBIOLOGY:   Blood culture: NGTD    IMAGING: reviewed    MEDS:  Current medications reviewed     ASSESSMENT/PLAN: 33 y.o. female admitted for worsening abdominal pain and diverticulitis.     # Diverticulitis vs Epiploic Appendagitis   # Leukocytosis  Abdominal pain, worsening since yesterday, no associated fevers/chills, no nausea however poor PO intake  CBC with elevated WBCs and left shift  CT abd/pelvis suggestive of sigmoid diverticulitis/epiploic appendagitis with recs for colonoscopy to rule out underlying lesion.  Reviewed images with radiologist and the presentation appears slightly abnormal for what diverticulitis usually looks like with increased mural thickening and diffuse inflammation that might be more suggestive of a neoplastic process.  Flagyl and rocephin started in ED with IV pain control  Plan:  - Continue 2 g Rocephin q 24 and 500 mg Flagyl q 8  - 2-4 mg IV morphine PRN pain  - d/c IVF, start clear liquid diet, advance as tolerated.   - Pt was referred to  GI as an outpatient but has not scheduled appointment, will need close follow up on dc  - CBC in am     #FEN/GI  - Clears, advance as tolerated      # KORTNEY  Monitor vitals overnight, supplemental O2 if needed  Pt has referral to pulmonology/sleep medicine already as outpatient     #Dispo  Inpatient for IV antibiotics and diverticulitis     #Core Measures   VTE PPx: subq heparin  Abx:Rocephin, Flagyl  Fluids: NS @ 150/hr  Lines and Tube:PIV  Diet: clears, advance as tolerated  Code Status: Full

## 2018-11-17 NOTE — PROGRESS NOTES
A/Ox 4.  VSS.  Reports moderate to severe abdominal pain 10 /10, medicated per MAR, ice refused.    Abdomen soft, tender.  +normoactive BSx4, +flatus, +nausea.    + ARCE, +chronic numbness and tingling.  RA, satting >90%, denies SOB or difficulty breathing.  NPO.  + void, QS.  Up self, tolerates activity well.  Up to BR, repositions self frequently.  IVF and IV abx to PIV, PO intake encouraged.    Call light within reach, calls appropriately.  Bed in lowest locked position.

## 2018-11-18 VITALS
TEMPERATURE: 97 F | SYSTOLIC BLOOD PRESSURE: 118 MMHG | WEIGHT: 257.28 LBS | OXYGEN SATURATION: 97 % | RESPIRATION RATE: 16 BRPM | DIASTOLIC BLOOD PRESSURE: 76 MMHG | BODY MASS INDEX: 43.92 KG/M2 | HEIGHT: 64 IN | HEART RATE: 74 BPM

## 2018-11-18 LAB
ANION GAP SERPL CALC-SCNC: 7 MMOL/L (ref 0–11.9)
BASOPHILS # BLD AUTO: 0.3 % (ref 0–1.8)
BASOPHILS # BLD: 0.03 K/UL (ref 0–0.12)
BUN SERPL-MCNC: 6 MG/DL (ref 8–22)
CALCIUM SERPL-MCNC: 9.1 MG/DL (ref 8.5–10.5)
CHLORIDE SERPL-SCNC: 106 MMOL/L (ref 96–112)
CO2 SERPL-SCNC: 23 MMOL/L (ref 20–33)
CREAT SERPL-MCNC: 0.52 MG/DL (ref 0.5–1.4)
EOSINOPHIL # BLD AUTO: 0.22 K/UL (ref 0–0.51)
EOSINOPHIL NFR BLD: 2.3 % (ref 0–6.9)
ERYTHROCYTE [DISTWIDTH] IN BLOOD BY AUTOMATED COUNT: 42.9 FL (ref 35.9–50)
GLUCOSE SERPL-MCNC: 84 MG/DL (ref 65–99)
HCT VFR BLD AUTO: 41.3 % (ref 37–47)
HGB BLD-MCNC: 13.7 G/DL (ref 12–16)
IMM GRANULOCYTES # BLD AUTO: 0.03 K/UL (ref 0–0.11)
IMM GRANULOCYTES NFR BLD AUTO: 0.3 % (ref 0–0.9)
LYMPHOCYTES # BLD AUTO: 2.08 K/UL (ref 1–4.8)
LYMPHOCYTES NFR BLD: 22.1 % (ref 22–41)
MAGNESIUM SERPL-MCNC: 2.1 MG/DL (ref 1.5–2.5)
MCH RBC QN AUTO: 30.3 PG (ref 27–33)
MCHC RBC AUTO-ENTMCNC: 33.2 G/DL (ref 33.6–35)
MCV RBC AUTO: 91.4 FL (ref 81.4–97.8)
MONOCYTES # BLD AUTO: 0.61 K/UL (ref 0–0.85)
MONOCYTES NFR BLD AUTO: 6.5 % (ref 0–13.4)
NEUTROPHILS # BLD AUTO: 6.45 K/UL (ref 2–7.15)
NEUTROPHILS NFR BLD: 68.5 % (ref 44–72)
NRBC # BLD AUTO: 0 K/UL
NRBC BLD-RTO: 0 /100 WBC
PLATELET # BLD AUTO: 261 K/UL (ref 164–446)
PMV BLD AUTO: 9.2 FL (ref 9–12.9)
POTASSIUM SERPL-SCNC: 3.8 MMOL/L (ref 3.6–5.5)
RBC # BLD AUTO: 4.52 M/UL (ref 4.2–5.4)
SODIUM SERPL-SCNC: 136 MMOL/L (ref 135–145)
WBC # BLD AUTO: 9.4 K/UL (ref 4.8–10.8)

## 2018-11-18 PROCEDURE — 83735 ASSAY OF MAGNESIUM: CPT

## 2018-11-18 PROCEDURE — 85025 COMPLETE CBC W/AUTO DIFF WBC: CPT

## 2018-11-18 PROCEDURE — 700105 HCHG RX REV CODE 258

## 2018-11-18 PROCEDURE — 700101 HCHG RX REV CODE 250: Performed by: STUDENT IN AN ORGANIZED HEALTH CARE EDUCATION/TRAINING PROGRAM

## 2018-11-18 PROCEDURE — 36415 COLL VENOUS BLD VENIPUNCTURE: CPT

## 2018-11-18 PROCEDURE — 80048 BASIC METABOLIC PNL TOTAL CA: CPT

## 2018-11-18 PROCEDURE — 700111 HCHG RX REV CODE 636 W/ 250 OVERRIDE (IP): Performed by: STUDENT IN AN ORGANIZED HEALTH CARE EDUCATION/TRAINING PROGRAM

## 2018-11-18 RX ORDER — METRONIDAZOLE 500 MG/1
500 TABLET ORAL EVERY 8 HOURS
Qty: 22 TAB | Refills: 0 | Status: SHIPPED | OUTPATIENT
Start: 2018-11-18 | End: 2019-01-03

## 2018-11-18 RX ORDER — SODIUM CHLORIDE 9 MG/ML
INJECTION, SOLUTION INTRAVENOUS
Status: COMPLETED
Start: 2018-11-18 | End: 2018-11-18

## 2018-11-18 RX ORDER — LEVOFLOXACIN 500 MG/1
750 TABLET, FILM COATED ORAL DAILY
Qty: 7 TAB | Refills: 0 | Status: SHIPPED | OUTPATIENT
Start: 2018-11-18 | End: 2019-01-03

## 2018-11-18 RX ADMIN — HEPARIN SODIUM 5000 UNITS: 5000 INJECTION, SOLUTION INTRAVENOUS; SUBCUTANEOUS at 06:51

## 2018-11-18 RX ADMIN — SODIUM CHLORIDE 500 ML: 9 INJECTION, SOLUTION INTRAVENOUS at 06:57

## 2018-11-18 RX ADMIN — METRONIDAZOLE 500 MG: 500 INJECTION, SOLUTION INTRAVENOUS at 06:51

## 2018-11-18 ASSESSMENT — PAIN SCALES - GENERAL
PAINLEVEL_OUTOF10: 7
PAINLEVEL_OUTOF10: 7

## 2018-11-18 NOTE — DISCHARGE INSTRUCTIONS
Discharge Instructions    Discharged to home by car with relative. Discharged via wheelchair, hospital escort: Yes.  Special equipment needed: Not Applicable    Be sure to schedule a follow-up appointment with your primary care doctor or any specialists as instructed.     Discharge Plan:   Diet Plan: Discussed  Activity Level: Discussed  Smoking Cessation Offered: Patient Refused  Confirmed Follow up Appointment: Patient to Call and Schedule Appointment  Confirmed Symptoms Management: Discussed  Medication Reconciliation Updated: Yes  Pneumococcal Vaccine Administered/Refused: Given (See MAR)  Influenza Vaccine Indication: Indicated: 9 to 64 years of age  Influenza Vaccine Given - only chart on this line when given: Influenza Vaccine Given (See MAR)    I understand that a diet low in cholesterol, fat, and sodium is recommended for good health. Unless I have been given specific instructions below for another diet, I accept this instruction as my diet prescription.   Other diet: Regular    Special Instructions:     F/u with PCP in 2 weeks: [phone]   F/u with [facility] in 6 weeks [phone]   If worsening of symptoms, fevers, or nausea and vomiting call PCP or come to ED      · Is patient discharged on Warfarin / Coumadin?   No       Diverticulitis  Diverticulitis is when small pockets that have formed in your colon (large intestine) become infected or swollen.  Follow these instructions at home:  · Follow your doctor's instructions.  · Follow a special diet if told by your doctor.  · When you feel better, your doctor may tell you to change your diet. You may be told to eat a lot of fiber. Fruits and vegetables are good sources of fiber. Fiber makes it easier to poop (have bowel movements).  · Take supplements or probiotics as told by your doctor.  · Only take medicines as told by your doctor.  Keep all follow-up visits with your doctor.        FOLLOW-UP:  Please make an appointment with your physician in 1  week(s).  Call your physician immediately if you have any fevers greater than 102.5,  increasing abdominal pain, GI bleeding (from the colon or rectum),or nausea/vomiting.      CAUSE:  Some people may have congenital diverticulosis, but most people develop diverticulosis around or after age 40 due to a low-fiber, high-fat diet, along with inadequate fluid intake. This is very prevalent in the industrialized world where we eat a lot of processed, low fiber foods.  Diverticuli develop due to firm stool and high pressures in the colon, along with secondary spasm, which causes outpouchings to occus where the small arteries penetrate the wall of the colon to feed the internal lining.  These occur most commonly on the left side and lower portions of the colon.  Diverticuli can then cause bleeding from the arteries at these sites of weakness when they rupture or the diveritculi can get blocked with stool and debris and become obstructed, causing diverticulosis, which is due to an infection.  When these are infected, diverticulitis, it is often treated with antibiotics and bowel rest, but when severe, recurrent, or if rupture of the colon occurs, it may require surgery.  Following appropriate dietary changes and taking the proper precautions is therefore very important.    DIET:  You should increase your dietary fiber intake and take a fiber supplement twice a day.  Make sure that you are taking a supplement that is just fiber and is not a laxative, which should be noted on the package.  Starting a fiber supplement may cause increased gas, more frequent bowel movements, and distension at first but this should improve after a couple of weeks.  Try to eat whole wheat breads and pasta, more fruits and vegetables, along with brown rice and plenty of fluids.  Avoid small undigestible food items that could get stuck in these outpouchings, such as unpopped popcorn kernals, whole corn, small undigestible seeds, etc..    ACTIVITY:   Exercise is also a great way to prevent constipation and is encouraged.  It may also help prevent progression of your diverticulosis.  Always make sure you take in plenty of fluids when exercising.    MEDICATIONS:  Take an over-the-counter fiber supplement as noted above twice daily.  If your symptoms don't improve with fiber and dietary changes alone your physician may also recommend psyllium or methylcellulose as well.  If your physician has placed you on an antibiotic it is critical that you take the full course of these, even if your symptoms have improved, and that you not miss any doses.    QUESTIONS:  Please feel free to call your physician or the hospital  if you have any questions, and they will be glad to assist you.  If you have further questions it may also be helpful to meet with a dietitician.    ·   Contact a doctor if:  · Your pain does not get better.  · You have a hard time eating food.  · You are not pooping like normal.  Get help right away if:  · Your pain gets worse.  · Your problems do not get better.  · Your problems suddenly get worse.  · You have a fever.  · You keep throwing up (vomiting).  · You have bloody or black, tarry poop (stool).  This information is not intended to replace advice given to you by your health care provider. Make sure you discuss any questions you have with your health care provider.  Document Released: 06/05/2009 Document Revised: 05/25/2017 Document Reviewed: 11/12/2014  [employer] Patient Education © 2017 [employer]      Depression / Suicide Risk    As you are discharged from this [facility] facility, it is important to learn how to keep safe from harming yourself.    Recognize the warning signs:  · Abrupt changes in personality, positive or negative- including increase in energy   · Giving away possessions  · Change in eating patterns- significant weight changes-  positive or negative  · Change in sleeping patterns- unable to sleep or  sleeping all the time   · Unwillingness or inability to communicate  · Depression  · Unusual sadness, discouragement and loneliness  · Talk of wanting to die  · Neglect of personal appearance   · Rebelliousness- reckless behavior  · Withdrawal from people/activities they love  · Confusion- inability to concentrate     If you or a loved one observes any of these behaviors or has concerns about self-harm, here's what you can do:  · Talk about it- your feelings and reasons for harming yourself  · Remove any means that you might use to hurt yourself (examples: pills, rope, extension cords, firearm)  · Get professional help from the community (Mental Health, Substance Abuse, psychological counseling)  · Do not be alone:Call your Safe Contact- someone whom you trust who will be there for you.  · Call your local CRISIS HOTLINE 729-0786 or 838-555-4243  · Call your local Children's Mobile Crisis Response Team [place] (140) 007-8746 or www.Big Contacts  · Call the toll free National Suicide Prevention Hotlines   · National Suicide Prevention Lifeline 397-755-OSTC (8050)  · National Hope Line Network 800-SUICIDE (338-4586)

## 2018-11-18 NOTE — PROGRESS NOTES
Received report from evening RN.  Assumed care of patient.  Patient A&Ox4.  C/O pain 7/10 in abdomen, declines pain medication at this time.  No nausea or vomiting, advanced to regular diet.  +flatus, no BM.  +void.  Ambulating with steady gait.  ARCE, strength 5/5.  NO numbness/tingling.  Plan of care discussed.  Call light within reach.

## 2018-11-18 NOTE — CARE PLAN
Problem: Bowel/Gastric:  Goal: Normal bowel function is maintained or improved  Outcome: PROGRESSING AS EXPECTED  Patient having complaints of slight nausea, no vomiting.  +flatus, no BM.      Problem: Pain Management  Goal: Pain level will decrease to patient's comfort goal  Outcome: PROGRESSING AS EXPECTED  Patient having complaints of abdominal pain 7/10, declines medication at this time.  Tolerating ambulation in halls.

## 2018-11-18 NOTE — CONSULTS
General Surgery Consult    Date of Service: 11/17/2018    Consulting Physician: Lele Brooks M.D. [facility]    -------------------------------------------------------------------------------------------------    Chief complaint: abdominal pain    HPI: This is a 33 y.o. female who is presenting with LLQ abdominal pain. She says her pain is improving and she is down to 5/10. She has had this many times in the past and was treated for diverticulitis. CT currently confirms acute uncomplicated diverticulitis.    No history of colonoscopy.      Past Medical History:   Diagnosis Date   • Diverticulosis    • PCOS (polycystic ovarian syndrome)        Past Surgical History:   Procedure Laterality Date   • OTHER ABDOMINAL SURGERY         Current Facility-Administered Medications   Medication Dose Route Frequency Provider Last Rate Last Dose   • heparin injection 5,000 Units  5,000 Units Subcutaneous Q8HRS Avis-Treasure Erwinuson, D.O.   5,000 Units at 11/17/18 2130   • cefTRIAXone (ROCEPHIN) 2 g in  mL IVPB  2 g Intravenous Q24HR Avis-Treasure Erwinuson, D.O.   Stopped at 11/17/18 1606   • metroNIDAZOLE (FLAGYL) IVPB 500 mg  500 mg Intravenous Q8HRS Avis-Treasure Erwinuson, D.O.   Stopped at 11/17/18 2229   • ondansetron (ZOFRAN) syringe/vial injection 4 mg  4 mg Intravenous Q4HRS PRN Avis-Treasure Erwinuson, D.O.   4 mg at 11/17/18 1819   • morphine (pf) 10 mg/ml 10 MG/ML injection 2-4 mg  2-4 mg Intravenous Q4HRS PRN Avis-Treasure Erwinuson, D.O.   4 mg at 11/16/18 1901   • HYDROcodone-acetaminophen (NORCO) 5-325 MG per tablet 1-2 Tab  1-2 Tab Oral Q4HRS PRN Argelia Gibson M.D.   2 Tab at 11/17/18 2129       Social History     Social History   • Marital status: Single     Spouse name: N/A   • Number of children: N/A   • Years of education: N/A     Occupational History   • Not on file.     Social History Main Topics   • Smoking status: Current Every Day Smoker     Packs/day: 0.15     Types: Cigarettes   • Smokeless  "tobacco: Never Used   • Alcohol use No      Comment: occ   • Drug use: Yes     Types: Inhaled      Comment: thc   • Sexual activity: Not on file     Other Topics Concern   • Not on file     Social History Narrative   • No narrative on file       History reviewed. No pertinent family history.    Allergies:  Patient has no known allergies.    Review of Systems:  Constitutional: Negative for fever, chills, weight loss,   HENT:   Negative for hearing loss or tinnitus    Eyes:    Negative for blurred vision, double vision, or loss of vision  Respiratory:  Negative for cough, hemoptysis, or wheezing    Cardiac:  Negative for chest pain or palpitations or orthopnea  Vascular:  Negative for claudication or rest pain   Gastrointestinal: As per HPI   Genitourinary: Negative for dysuria, frequency, or hematuria   Musculoskeletal: Negative for myalgias, back pain, or joint pain  Skin:   Negative for itching or rash  Neurological:  Negative for dizziness, headaches, or tremors     Negative for speech disturbance     Negative for extremity weakness or paresthesias  Endo/Heme:  Negative for easy bruising or bleeding  Psychiatric:  Negative for depression, suicidal ideas, or hallucinations    Physical Exam:  Blood pressure 108/68, pulse 74, temperature 36.7 °C (98 °F), temperature source Temporal, resp. rate 17, height 1.626 m (5' 4\"), weight 116.7 kg (257 lb 4.4 oz), SpO2 97 %, not currently breastfeeding.    Constitutional: Alert, oriented, no acute distress  HEENT:  Normocephalic and atraumatic, EOMI  Neck:   Supple, no JVD,   Cardiovascular: Regular rate and rhythm,   Pulmonary:  Good air entry bilaterally,   Abdominal:  Soft,  Non-distended     Mildly tender to palpation in LLQ     No rebound/guarding  Musculoskeletal: No edema, no tenderness  Neurological:  CN II-XII grossly intact, no focal deficits  Skin:   Skin is warm and dry. No rash noted.  Psychiatric:  Normal mood and affect.    Labs:  Recent Labs      11/16/18   1144 "  11/17/18   0348   WBC  17.4*  13.1*   RBC  5.46*  4.51   HEMOGLOBIN  16.4*  13.5   HEMATOCRIT  47.1*  39.7   MCV  86.3  88.0   MCH  30.0  29.9   MCHC  34.8  34.0   RDW  40.4  41.8   PLATELETCT  331  258   MPV  9.2  9.3     Recent Labs      11/16/18   1144  11/17/18   0348   SODIUM  136  135   POTASSIUM  3.8  3.4*   CHLORIDE  103  106   CO2  23  23   GLUCOSE  93  100*   BUN  12  11   CREATININE  0.56  0.54   CALCIUM  9.7  8.6         Recent Labs      11/16/18   1144  11/17/18   0348   ASTSGOT  13  9*   ALTSGPT  15  10   TBILIRUBIN  1.0  1.2   ALKPHOSPHAT  60  50   GLOBULIN  3.5  2.9       Radiology:  CT: acute uncomplicated diverticulitis.    Assessment: This is a 33 y.o. female with acute uncomplicated diverticulitis.    Plan:  Continue Abx  Resume diet slowly,  Wean pain medication off  Needs outpatient follow-up with GI and Colorectal surgery.  No surgical intervention needed at this time.    Lele Brooks M.D.  [facility]  [phone]  Cell: [phone]

## 2018-11-18 NOTE — PROGRESS NOTES
Patient given discharge instructions and prescriptions.  Patient demonstrated understanding.  PIV removed.  Patient to follow up with PCP and [facility] outpatient.  Patient declines wheelchair out and discharged home with family.

## 2018-11-18 NOTE — PROGRESS NOTES
[facility] FAMILY MEDICINE PROGRESS NOTE     Attending: Dr. Kiran  Resident: Susanna Claros (PGY-1)  PATIENT: Mariza Jordan; [id number]; 1985    ID: 33 y.o. female admitted for diverticulitis.    SUBJECTIVE: No acute events overnight.  Tolerated full liquid diet yesterday and a regular diet for breakfast. Passing flatus.  Denies nausea and vomiting.  Pain now 5/10.  Excited to go home.     OBJECTIVE:     Vitals:    11/17/18 1638 11/17/18 1830 11/18/18 0230 11/18/18 0801   BP: 124/84 108/68 104/65 118/76   Pulse: 74 74 69 74   Resp: 16 17 18 16   Temp: 36.1 °C (97 °F) 36.7 °C (98 °F) 36.2 °C (97.2 °F) 36.1 °C (97 °F)   TempSrc: Temporal Temporal Temporal Temporal   SpO2: 97% 97% 95% 97%   Weight:       Height:           Intake/Output Summary (Last 24 hours) at 11/18/18 0549  Last data filed at 11/18/18 0230   Gross per 24 hour   Intake             2100 ml   Output                0 ml   Net             2100 ml       PE:  General: obese female   HEENT: NC/AT. PERRLA.   Cardiovascular: RRR with no M/R/G.  Respiratory: Symmetrical chest. CTAB   Abdomen: soft, normal bowel sounds, mildly tender to palpation of lower quadrants   EXT:  No peripheral edema     LABS:  Recent Labs      11/16/18   1144  11/17/18   0348  11/18/18   0625   WBC  17.4*  13.1*  9.4   RBC  5.46*  4.51  4.52   HEMOGLOBIN  16.4*  13.5  13.7   HEMATOCRIT  47.1*  39.7  41.3   MCV  86.3  88.0  91.4   MCH  30.0  29.9  30.3   RDW  40.4  41.8  42.9   PLATELETCT  331  258  261   MPV  9.2  9.3  9.2   NEUTSPOLYS  78.20*   --   68.50   LYMPHOCYTES  14.60*   --   22.10   MONOCYTES  5.30   --   6.50   EOSINOPHILS  0.90   --   2.30   BASOPHILS  0.40   --   0.30     Recent Labs      11/16/18   1144  11/17/18   0348  11/18/18   0625   SODIUM  136  135  136   POTASSIUM  3.8  3.4*  3.8   CHLORIDE  103  106  106   CO2  23  23  23   BUN  12  11  6*   CREATININE  0.56  0.54  0.52   CALCIUM  9.7  8.6  9.1   MAGNESIUM   --    --   2.1   ALBUMIN  4.6  3.6   --      Estimated  GFR/CRCL = Estimated Creatinine Clearance: 194.9 mL/min (by C-G formula based on SCr of 0.52 mg/dL).  Recent Labs      11/16/18   1144  11/17/18   0348  11/18/18   0625   GLUCOSE  93  100*  84     Recent Labs      11/16/18   1144  11/17/18   0348   ASTSGOT  13  9*   ALTSGPT  15  10   TBILIRUBIN  1.0  1.2   ALKPHOSPHAT  60  50   GLOBULIN  3.5  2.9             No results for input(s): INR, APTT, FIBRINOGEN in the last 72 hours.    Invalid input(s): DIMER    MICROBIOLOGY:   Blood Cx: NGTD    IMAGING: reviewed    MEDS:  Current medications reviewed     ASSESSMENT/PLAN: 33 y.o. female admitted for worsening abdominal pain and diverticulitis.     # Uncomplicated Diverticulitis    # Leukocytosis  Abdominal pain, worsening since yesterday, no associated fevers/chills, no nausea however poor PO intake  - CBC with elevated WBCs and left shift  WBC 17->9  - CT abd/pelvis suggestive of sigmoid diverticulitis/epiploic appendagitis with recs for colonoscopy to rule out underlying lesion.  Reviewed images with radiologist and the presentation appears slightly abnormal for what diverticulitis usually looks like with increased mural thickening and diffuse inflammation that might be more suggestive of a neoplastic process.  - Continue 2 g Rocephin q 24 and 500 mg Flagyl q 8.  Transition to flagyl and levofloxacin to complete 10 day course.  - Norco PRN for pain   - General Surgery Consulted: recommend outpatient f/u with GI  - regular diet     #Hypokalemia    - K 3.8    # KORTNEY  Monitor vitals overnight, supplemental O2 if needed  Pt has referral to pulmonology/sleep medicine already as outpatient    #Obesity:  BMI 44.6  Nutrition consulted: recommending OP referral to nutrition      #Dispo  Home today      #Core Measures   VTE PPx: subq heparin  Abx:Rocephin, Flagyl  Fluids: NS @ 150/hr  Lines and Tube:PIV  Diet: clears, advance as tolerated  Code Status: Full

## 2018-11-18 NOTE — CARE PLAN
Problem: Safety  Goal: Will remain free from injury  Updated about POC. Reinforce call light use. Pt acknowledged understanding    Problem: Infection  Goal: Will remain free from infection  Iv abx continued. Remains afebrile. Am labs ordered.

## 2018-11-18 NOTE — PROGRESS NOTES
Assumed care at 1845. Pt resting in bed. A&ox 4  Ambulating independently  Tolerating full liq diet all day. Will like to advance to reg in AM  +flatus. No bm yet  Pain controlled with norco prn. Some nausea with narcotic. Medicated with zofran  Voiding frequently and adequately  Iv abx continued.  Call light within reach. Hourly rounding in place

## 2018-11-19 NOTE — DISCHARGE SUMMARY
[facility] FAMILY MEDICINE ADULT DISCHARGE SUMMARY     Admit Date:  11/16/2018       Discharge Date:   11/18/2018    Service:   [facility] Family Medicine Inpatient Team  Attending Physician(s):   Dr. Hare  [place] Resident(s):   Susanna Claros M.D. (PGY-1)    Primary Diagnosis:   Uncomplicated diverticulitis  Secondary Diagnoses:                Leukocytosis  Hypokalemia  KORTNEY  Obesity    HPI:     33-year-old female wit past medical history of PCOS, KORTNEY, and diverticulitis who presented with worsening abdominal pain over 2 days.  Episodes of fecal incontinence.  Denied black or bloody stools.    Hospital Course:    #Diverticulitis  2-day history of worsening abdominal pain and decreased p.o. intake.  In the ED had leukocytosis with left shift.  CT abdomen suggesting sigmoid colon diverticulitis versus epiploic appeagitis.  Concerned that increased mural thickening and diffuse inflammation might be suggestive of neoplastic process.  Patient was treated with Rocephin and Flagyl.  Gradual improvement in pain.  Surgery was consulted and had no further recommendations.  Patient was tolerating regular diet at time of discharge.  Transition to Flagyl and levofloxacin to complete 10-day course of antibiotics.  She will need to follow-up with GI in 6 weeks for colonoscopy to rule out malignant lesion.      #KORTNEY:  Saturation greater than 92% on room air.  Has outpatient referral to pulmonology/sleep medicine.    #Hypokalemia  Treated with oral potassium    #Obesity  BMI 44.6.  History of PCOS.  Recommend outpatient follow-up    Consultants:      Surgery: Lele Brooks M.D. [facility]    Imaging/ Testing:      WBC 17->9  - CT abdomen pelvis: Findings suggestive of sigmoid diverticulitis/epiploic appendagitis.  - Recommend colonoscopy upon resolution of acute symptoms to exclude an underlying colonic lesion.        Discharge Medications:           Mariza Jordan   Home Medication Instructions CACHORRO:[id number]     Printed on:11/18/18 8630   Medication Information                      levoFLOXacin (LEVAQUIN) 500 MG tablet  Take 1.5 Tabs by mouth every day.             levonorgestrel (MIRENA, 52 MG,) 20 MCG/24HR IUD  1 Each by Intrauterine route Once.             metroNIDAZOLE (FLAGYL) 500 MG Tab  Take 1 Tab by mouth every 8 hours.                 Disposition:   To home    Diet:   As tolerated    Activity:   As tolerated    Instructions:      The patient was instructed to return to the ER in the event of worsening symptoms. I have counseled the patient on the importance of compliance and the patient has agreed to proceed with all medical recommendations and follow up plan indicated above.   The patient understands that all medications come with benefits and risks. Risks may include permanent injury or death and these risks can be minimized with close reassessment and monitoring.        Please CC: Avis Cisneros DO.    Follow up appointment details :      PCP 2 weeks  GI consultants: 6 weeks [phone]    Pending Studies:        None       Susanna Claros M.D.   PGY-1  [facility]   [phone]

## 2018-11-21 LAB
BACTERIA BLD CULT: NORMAL
BACTERIA BLD CULT: NORMAL
SIGNIFICANT IND 70042: NORMAL
SIGNIFICANT IND 70042: NORMAL
SITE SITE: NORMAL
SITE SITE: NORMAL
SOURCE SOURCE: NORMAL
SOURCE SOURCE: NORMAL

## 2018-11-28 NOTE — PROGRESS NOTES
Report received from RN in ED.   Pt arrived to unit via transport.  Assessment complete.  A&O x 4. Patient calls appropriately.  Patient up with standby assist.    Patient has 8/10 pain. Medicated per MAR.  Denies N&V. Tolerating ice chips.  positive void, positive flatus  Patient denies SOB.  Patient resting in bed.  Review plan with of care with patient. Call light and personal belongings with in reach. Hourly rounding in place. All needs met at this time.   28-Nov-2018 12:53

## 2019-01-03 ENCOUNTER — APPOINTMENT (OUTPATIENT)
Dept: ADMISSIONS | Facility: MEDICAL CENTER | Age: 34
End: 2019-01-03
Attending: INTERNAL MEDICINE
Payer: COMMERCIAL

## 2019-01-09 ENCOUNTER — HOSPITAL ENCOUNTER (OUTPATIENT)
Facility: MEDICAL CENTER | Age: 34
End: 2019-01-09
Attending: INTERNAL MEDICINE | Admitting: INTERNAL MEDICINE
Payer: COMMERCIAL

## 2019-01-09 VITALS
BODY MASS INDEX: 44.9 KG/M2 | SYSTOLIC BLOOD PRESSURE: 127 MMHG | HEART RATE: 64 BPM | RESPIRATION RATE: 29 BRPM | OXYGEN SATURATION: 99 % | DIASTOLIC BLOOD PRESSURE: 77 MMHG | TEMPERATURE: 98.8 F | WEIGHT: 263.01 LBS | HEIGHT: 64 IN

## 2019-01-09 PROBLEM — K57.30 DIVERTICULOSIS LARGE INTESTINE W/O PERFORATION OR ABSCESS W/O BLEEDING: Status: ACTIVE | Noted: 2019-01-09

## 2019-01-09 LAB
B-HCG FREE SERPL-ACNC: <5 MIU/ML
IHCGL IHCGL: NEGATIVE MIU/ML

## 2019-01-09 PROCEDURE — 700111 HCHG RX REV CODE 636 W/ 250 OVERRIDE (IP)

## 2019-01-09 PROCEDURE — 160009 HCHG ANES TIME/MIN: Performed by: INTERNAL MEDICINE

## 2019-01-09 PROCEDURE — 160002 HCHG RECOVERY MINUTES (STAT): Performed by: INTERNAL MEDICINE

## 2019-01-09 PROCEDURE — 160025 RECOVERY II MINUTES (STATS): Performed by: INTERNAL MEDICINE

## 2019-01-09 PROCEDURE — 160035 HCHG PACU - 1ST 60 MINS PHASE I: Performed by: INTERNAL MEDICINE

## 2019-01-09 PROCEDURE — 160046 HCHG PACU - 1ST 60 MINS PHASE II: Performed by: INTERNAL MEDICINE

## 2019-01-09 PROCEDURE — 84702 CHORIONIC GONADOTROPIN TEST: CPT

## 2019-01-09 PROCEDURE — 160048 HCHG OR STATISTICAL LEVEL 1-5: Performed by: INTERNAL MEDICINE

## 2019-01-09 PROCEDURE — 160204 HCHG ENDO MINUTES - 1ST 30 MINS LEVEL 5: Performed by: INTERNAL MEDICINE

## 2019-01-09 RX ORDER — MIDAZOLAM HYDROCHLORIDE 1 MG/ML
1 INJECTION INTRAMUSCULAR; INTRAVENOUS
Status: DISCONTINUED | OUTPATIENT
Start: 2019-01-09 | End: 2019-01-09 | Stop reason: HOSPADM

## 2019-01-09 RX ORDER — OXYCODONE HCL 5 MG/5 ML
10 SOLUTION, ORAL ORAL
Status: DISCONTINUED | OUTPATIENT
Start: 2019-01-09 | End: 2019-01-09 | Stop reason: HOSPADM

## 2019-01-09 RX ORDER — ONDANSETRON 2 MG/ML
4 INJECTION INTRAMUSCULAR; INTRAVENOUS
Status: DISCONTINUED | OUTPATIENT
Start: 2019-01-09 | End: 2019-01-09 | Stop reason: HOSPADM

## 2019-01-09 RX ORDER — DIPHENHYDRAMINE HYDROCHLORIDE 50 MG/ML
12.5 INJECTION INTRAMUSCULAR; INTRAVENOUS
Status: DISCONTINUED | OUTPATIENT
Start: 2019-01-09 | End: 2019-01-09 | Stop reason: HOSPADM

## 2019-01-09 RX ORDER — METOPROLOL TARTRATE 1 MG/ML
1 INJECTION, SOLUTION INTRAVENOUS
Status: DISCONTINUED | OUTPATIENT
Start: 2019-01-09 | End: 2019-01-09 | Stop reason: HOSPADM

## 2019-01-09 RX ORDER — HALOPERIDOL 5 MG/ML
1 INJECTION INTRAMUSCULAR
Status: DISCONTINUED | OUTPATIENT
Start: 2019-01-09 | End: 2019-01-09 | Stop reason: HOSPADM

## 2019-01-09 RX ORDER — SODIUM CHLORIDE, SODIUM LACTATE, POTASSIUM CHLORIDE, CALCIUM CHLORIDE 600; 310; 30; 20 MG/100ML; MG/100ML; MG/100ML; MG/100ML
INJECTION, SOLUTION INTRAVENOUS ONCE
Status: COMPLETED | OUTPATIENT
Start: 2019-01-09 | End: 2019-01-09

## 2019-01-09 RX ORDER — SODIUM CHLORIDE, SODIUM LACTATE, POTASSIUM CHLORIDE, CALCIUM CHLORIDE 600; 310; 30; 20 MG/100ML; MG/100ML; MG/100ML; MG/100ML
INJECTION, SOLUTION INTRAVENOUS CONTINUOUS
Status: DISCONTINUED | OUTPATIENT
Start: 2019-01-09 | End: 2019-01-09 | Stop reason: HOSPADM

## 2019-01-09 RX ORDER — OXYCODONE HCL 5 MG/5 ML
5 SOLUTION, ORAL ORAL
Status: DISCONTINUED | OUTPATIENT
Start: 2019-01-09 | End: 2019-01-09 | Stop reason: HOSPADM

## 2019-01-09 RX ADMIN — SODIUM CHLORIDE, SODIUM LACTATE, POTASSIUM CHLORIDE, CALCIUM CHLORIDE: 600; 310; 30; 20 INJECTION, SOLUTION INTRAVENOUS at 08:00

## 2019-01-09 ASSESSMENT — PAIN SCALES - GENERAL
PAINLEVEL_OUTOF10: 0
PAINLEVEL_OUTOF10: 0
PAINLEVEL_OUTOF10: 5
PAINLEVEL_OUTOF10: 0

## 2019-01-09 NOTE — DISCHARGE INSTRUCTIONS
GASTROSCOPY OR ERCP  1. Don't eat or drink anything for about an hour after the test. You can then resume your regular diet.   2. Don't drive or drink alcohol for 24 hours. The medication you received will make you too drowsy.  3. Don't take any coffee, tea, or aspirin products until after you see your doctor. These can harm the lining of your stomach.  4. If you begin to vomit bloody material, or develop black or bloody stools, call your doctor as soon as possible.   5. If you have any neck, chest, abdominal pain or temp over 100 degrees call your doctor.   6. See your doctor on:   7. Additional instructions:  8. Prescriptions:    COLONOSCOPY OR FLEXIBLE SIGMOIDOSCOPY  1. If you received a barium enema, take a mild laxative such as dulcolax to clean out the barium.   2. Drink plenty of fluids. Eat a diet high in fiber; such foods as whole-grain breads, fresh fruit and vegetables, nuts are recommended.  3. You may notice a few drops of blood with your first bowel movement. If you develop any large amount of bleeding, black stools, a fever, or abdominal pain, call your doctor right away.   4. Call your doctor for test results in *7-10** days.  5. Don't drive or drink alcohol for 24 hours. The medication you received will make you too drowsy.   6. No heavy lifting, ASA products or ASA x 5 days *FOLLOW DR ARREOLA'S INSTRUCTIONS*[phone]*  7. Additional instructions: *INCREASE FIBER IN DIET, CHANGE DIET AS NEEDED TO AVOID SYMPTOMS**  8. Prescriptions: *NONE**    Discharge time: *1025**    You should call 911 if you develop problems with breathing or chest pain.    Nurse's Signature: ___________________________________    I acknowledge receipt and understanding of these Home Care instructions.

## 2019-01-09 NOTE — OR SURGEON
Immediate Post OP Note    PreOp Diagnosis: Diverticulitis    PostOp Diagnosis: Sigmoid diverticulosis    Procedure(s):  COLONOSCOPY - Wound Class: Clean Contaminated    Surgeon(s):  Graham Wadsworth M.D.    Anesthesiologist/Type of Anesthesia:  Anesthesiologist: Maday Mendez M.D./KANDI    Surgical Staff:  Circulator: Rachel Candelario R.N.  Endoscopy Technician: Luis Luna    Specimens removed if any:  None    Estimated Blood Loss: None    Findings:   1. Moderate sigmoid diverticulosis  2. Otherwise normal    Complications: None        1/9/2019 9:42 AM Graham Wadsworth M.D.

## 2019-01-09 NOTE — PROCEDURES
DATE OF SERVICE:  01/09/2019    PROCEDURE:  Colonoscopy.    :  Graham Wadsworth MD    INDICATION:  The patient is a 33-year-old female with history of recurrent   acute diverticulitis and abnormal CT scan with thickening of the sigmoid   colon.    INFORMED CONSENT:  Written informed consent was obtained from the patient   after thorough explanation of the indications, benefits and risks of the   procedure, which included but was not limited to bleeding, infection,   perforation, adverse reaction to medications and missed lesions.    MEDICATIONS GIVEN:  Monitored anesthesia care with propofol.    Continuous telemetry, BP, pulse oximetry, and capnography were performed by   the anesthesiologist throughout the procedure.    An adult colonoscope was used for the procedure.    FINDINGS:  Patient was placed in the left lateral decubitus position.  After   anesthesia was achieved, a digital rectal exam was performed and found to be   normal.  The colonoscope was then inserted into the rectum and advanced to the   cecum, which was identified by the appendiceal orifice and ileocecal valve.    The terminal ileum was intubated and found to be normal.  The colonoscope was   then withdrawn with careful inspection of the mucosa.  She did have evidence   for moderate sigmoid diverticulosis, but no obvious colonic inflammation,   polyps or tumors.  Retroflexion in the rectum was unremarkable.  Patient   underwent a GoLYTELY bowel prep.  The results of which were good.  The patient   tolerated the procedure well.    IMPRESSION:  1.  Moderate sigmoid diverticulosis, uncomplicated.  2.  Otherwise, unremarkable colonoscopy.    PLAN:  1.  Discussed Benefiber supplemental 1 tablespoon twice daily.  2.  Follow up in GI clinic as previously scheduled.  3.  Resume average risk colon cancer screening at the age of 51.       ____________________________________     MD ORLANDO ROTH / ABNER    DD:  01/09/2019  09:45:58  DT:  01/09/2019 09:53:18    D#:  [id number]  Job#:  [id number]

## 2019-01-09 NOTE — OR NURSING
0907-Received via [employer], arousing.  RR even, unlabored.  Appears comfortable    1000-A/A/O x4  HOB elevated, taking PO fluids.   in to visit.  Denies pain/discomfort    1005-Dr Wadsworth in to talk with patient    1015-DC instructions discussed & signed.  Questions answered.    1025-DC ambulatory to car.  Gait steady.

## 2019-02-11 ENCOUNTER — HOSPITAL ENCOUNTER (EMERGENCY)
Facility: MEDICAL CENTER | Age: 34
End: 2019-02-11
Attending: EMERGENCY MEDICINE
Payer: COMMERCIAL

## 2019-02-11 VITALS
HEART RATE: 84 BPM | RESPIRATION RATE: 16 BRPM | DIASTOLIC BLOOD PRESSURE: 84 MMHG | BODY MASS INDEX: 45.49 KG/M2 | SYSTOLIC BLOOD PRESSURE: 137 MMHG | WEIGHT: 265 LBS | OXYGEN SATURATION: 93 % | TEMPERATURE: 98.5 F

## 2019-02-11 DIAGNOSIS — J11.1 INFLUENZA: ICD-10-CM

## 2019-02-11 PROCEDURE — 700102 HCHG RX REV CODE 250 W/ 637 OVERRIDE(OP): Performed by: EMERGENCY MEDICINE

## 2019-02-11 PROCEDURE — 99284 EMERGENCY DEPT VISIT MOD MDM: CPT

## 2019-02-11 PROCEDURE — A9270 NON-COVERED ITEM OR SERVICE: HCPCS | Performed by: EMERGENCY MEDICINE

## 2019-02-11 RX ORDER — OSELTAMIVIR PHOSPHATE 75 MG/1
75 CAPSULE ORAL 2 TIMES DAILY
Qty: 10 CAP | Refills: 0 | Status: SHIPPED | OUTPATIENT
Start: 2019-02-11 | End: 2019-02-16

## 2019-02-11 RX ORDER — BENZONATATE 100 MG/1
100 CAPSULE ORAL 3 TIMES DAILY PRN
Qty: 20 CAP | Refills: 0 | Status: SHIPPED | OUTPATIENT
Start: 2019-02-11 | End: 2019-06-30

## 2019-02-11 RX ORDER — IBUPROFEN 600 MG/1
600 TABLET ORAL EVERY 6 HOURS PRN
Qty: 30 TAB | Refills: 0 | Status: SHIPPED | OUTPATIENT
Start: 2019-02-11 | End: 2019-06-30

## 2019-02-11 RX ORDER — OSELTAMIVIR PHOSPHATE 75 MG/1
75 CAPSULE ORAL ONCE
Status: COMPLETED | OUTPATIENT
Start: 2019-02-11 | End: 2019-02-11

## 2019-02-11 RX ORDER — IBUPROFEN 600 MG/1
600 TABLET ORAL ONCE
Status: COMPLETED | OUTPATIENT
Start: 2019-02-11 | End: 2019-02-11

## 2019-02-11 RX ADMIN — IBUPROFEN 600 MG: 600 TABLET ORAL at 18:43

## 2019-02-11 RX ADMIN — OSELTAMIVIR PHOSPHATE 75 MG: 75 CAPSULE ORAL at 18:43

## 2019-02-12 NOTE — ED PROVIDER NOTES
ED Provider Note    CHIEF COMPLAINT  Chief Complaint   Patient presents with   • Flu Like Symptoms     pt reporting flu like symptoms       HPI  Mariza Jordan is a 33 y.o. female who presents to emerge from today with complaints of flulike symptoms.  Patient has had multiple family members including daughter was tested positive for fluids her symptoms started yesterday with congestion runny nose, muscle aches sore throat.  No nausea vomiting no chest pain or shortness of breath.    REVIEW OF SYSTEMS  See HPI for further details. All other systems are negative.      PAST MEDICAL HISTORY  Past Medical History:   Diagnosis Date   • Breath shortness 01/03/2019   • Sleep apnea 01/03/2019    no c pap   • Psychiatric problem 01/03/2019    PTSD    • Diverticulosis    • PCOS (polycystic ovarian syndrome)    • Snoring        FAMILY HISTORY  No family history on file.    SOCIAL HISTORY  Social History     Social History   • Marital status:      Spouse name: N/A   • Number of children: N/A   • Years of education: N/A     Social History Main Topics   • Smoking status: Current Every Day Smoker     Packs/day: 0.15     Types: Cigarettes   • Smokeless tobacco: Never Used   • Alcohol use Yes      Comment: 1 per week   • Drug use: Yes     Types: Inhaled      Comment: marijuana/vape   • Sexual activity: Not on file     Other Topics Concern   • Not on file     Social History Narrative   • No narrative on file       SURGICAL HISTORY  Past Surgical History:   Procedure Laterality Date   • COLONOSCOPY N/A 1/9/2019    Procedure: COLONOSCOPY;  Surgeon: Graham Wadsworth M.D.;  Location: SURGERY SAME DAY [facility];  Service: Gastroenterology   • OTHER  2012    surgery for ectopic preg   • GYN SURGERY  2007/2009    c sections   • OTHER ABDOMINAL SURGERY         CURRENT MEDICATIONS  Home Medications    **Home medications have not yet been reviewed for this encounter**         ALLERGIES  No Known Allergies    PHYSICAL  EXAM  VITAL SIGNS: /84   Pulse 84   Temp 36.9 °C (98.5 °F) (Temporal)   Resp 16   Wt 120.2 kg (265 lb)   SpO2 93%   BMI 45.49 kg/m²  Room air O2: 93    Constitutional :  Well developed, Well nourished, No acute distress, Non-toxic appearance.   HENT: Pharynx without injection TMs are slightly erythematous on the right.  Eyes: perrla  Neck: Normal range of motion, No tenderness, Supple, No stridor.   Lymphatic: Some submandibular lymphadenopathy.   Cardiovascular: Normal heart rate, Normal rhythm, No murmurs, No rubs, No gallops.   Thorax & Lungs: Clear to auscultation  Skin: Warm, Dry, No erythema, No rash.   Extremities: Intact distal pulses, No edema, No tenderness, No cyanosis, No clubbing.           COURSE & MEDICAL DECISION MAKING  Pertinent Labs & Imaging studies reviewed. (See chart for details)  Patient will be started on Tamiflu and will feel necessary and doing influenza testing as her family members have been positive.  Placed on Motrin, Tessalon and Tamiflu return if further problems.    FINAL IMPRESSION  1.  Acute influenza  2.   3.      Electronically signed by: Everardo Guillen, 2/11/2019

## 2019-02-12 NOTE — ED TRIAGE NOTES
.  Chief Complaint   Patient presents with   • Flu Like Symptoms     pt reporting flu like symptoms

## 2019-02-14 ENCOUNTER — HOSPITAL ENCOUNTER (EMERGENCY)
Facility: MEDICAL CENTER | Age: 34
End: 2019-02-15
Attending: EMERGENCY MEDICINE
Payer: COMMERCIAL

## 2019-02-14 ENCOUNTER — APPOINTMENT (OUTPATIENT)
Dept: RADIOLOGY | Facility: MEDICAL CENTER | Age: 34
End: 2019-02-14
Attending: EMERGENCY MEDICINE
Payer: COMMERCIAL

## 2019-02-14 DIAGNOSIS — I44.0 AV BLOCK, 1ST DEGREE: ICD-10-CM

## 2019-02-14 DIAGNOSIS — K92.0 HEMATEMESIS WITH NAUSEA: ICD-10-CM

## 2019-02-14 DIAGNOSIS — J11.1 INFLUENZA: ICD-10-CM

## 2019-02-14 LAB — EKG IMPRESSION: NORMAL

## 2019-02-14 PROCEDURE — 99285 EMERGENCY DEPT VISIT HI MDM: CPT

## 2019-02-14 PROCEDURE — 93005 ELECTROCARDIOGRAM TRACING: CPT | Performed by: EMERGENCY MEDICINE

## 2019-02-14 PROCEDURE — 93005 ELECTROCARDIOGRAM TRACING: CPT

## 2019-02-14 RX ORDER — ONDANSETRON 4 MG/1
4 TABLET, ORALLY DISINTEGRATING ORAL EVERY 8 HOURS PRN
Qty: 10 TAB | Refills: 0 | Status: SHIPPED | OUTPATIENT
Start: 2019-02-14 | End: 2019-06-30

## 2019-02-14 RX ORDER — ONDANSETRON 4 MG/1
4 TABLET, ORALLY DISINTEGRATING ORAL ONCE
Status: COMPLETED | OUTPATIENT
Start: 2019-02-15 | End: 2019-02-15

## 2019-02-15 VITALS
SYSTOLIC BLOOD PRESSURE: 131 MMHG | RESPIRATION RATE: 15 BRPM | TEMPERATURE: 98.1 F | BODY MASS INDEX: 46.48 KG/M2 | OXYGEN SATURATION: 94 % | WEIGHT: 272.27 LBS | DIASTOLIC BLOOD PRESSURE: 77 MMHG | HEART RATE: 65 BPM | HEIGHT: 64 IN

## 2019-02-15 PROCEDURE — 71045 X-RAY EXAM CHEST 1 VIEW: CPT

## 2019-02-15 PROCEDURE — 700111 HCHG RX REV CODE 636 W/ 250 OVERRIDE (IP): Performed by: EMERGENCY MEDICINE

## 2019-02-15 RX ADMIN — ONDANSETRON 4 MG: 4 TABLET, ORALLY DISINTEGRATING ORAL at 00:07

## 2019-02-15 NOTE — ED PROVIDER NOTES
"ED Provider Note    Scribed for Joseph Ochoa M.D. by Fabi Tomlinson. 2/14/2019  11:30 PM    Primary care provider: Pregnancy Julissa Ramirez M.D.  Means of arrival: Walk-In  History obtained from: Patient  History limited by: None    CHIEF COMPLAINT  Chief Complaint   Patient presents with   • Flu Like Symptoms   • N/V   • Chest Pain       HPI  Mariza Jordan is a 33 y.o. female who presents to the Emergency Department for intermittent sternal chest pain described as \"sore\" in quality onset 6 PM tonight after vomiting. It is exacerbated when standing up. She endorses associated mild shortness of breath and additionally notes flu-like symptoms of nausea, cough, difficulty breathing, diarrhea, generalized body aches, and vomiting. She was concerned as she noticed intermittent streaks of blood with vomit.     Patient describes body aches for past few days as if \"bone crushing\". Patient was able to tolerate food and fluids. She took milk of magnesia and ibuprofen for mild alleviation. The patient has had positive sick contact with daughter and  who tested postive for flu. She has been taking ibuprofen for mild relief.She additionally took milk of magnesia last night to help resolve constipation. Patient is not anticoagulated. She denies recent trauma and travel. Patient denies history for asthma or COPD. She denies dizziness, dysuria, or numbness. The patient has not recently traveled out of country and has not sustained any recent trauma.       REVIEW OF SYSTEMS  Pertinent positives include: constipation, nausea, cough, vomiting of tan emesis, diffuse chest pain which has recently resolved, difficulty breathing, hematemesis, diarrhea, frequent urination and generalized body pain.. Pertinent negatives include: dysuria, dizziness or numbness. See history of present illness. All other system negative.    PAST MEDICAL HISTORY   has a past medical history of Breath shortness (01/03/2019); Diverticulosis; PCOS " "(polycystic ovarian syndrome); Psychiatric problem (01/03/2019); Sleep apnea (01/03/2019); and Snoring.    SURGICAL HISTORY   has a past surgical history that includes other abdominal surgery; gyn surgery (2007/2009); other (2012); and colonoscopy (N/A, 1/9/2019).    SOCIAL HISTORY  Social History   Substance Use Topics   • Smoking status: Current Every Day Smoker     Packs/day: 0.15     Types: Cigarettes   • Smokeless tobacco: Never Used   • Alcohol use Yes      Comment: 1 per week      History   Drug Use   • Types: Inhaled     Comment: marijuana/vape       FAMILY HISTORY  History reviewed. No pertinent family history.    CURRENT MEDICATIONS  No current facility-administered medications on file prior to encounter.      Current Outpatient Prescriptions on File Prior to Encounter   Medication Sig Dispense Refill   • oseltamivir (TAMIFLU) 75 MG Cap Take 1 Cap by mouth 2 times a day for 5 days. 10 Cap 0   • benzonatate (TESSALON) 100 MG Cap Take 1 Cap by mouth 3 times a day as needed for Cough. 20 Cap 0   • ibuprofen (MOTRIN) 600 MG Tab Take 1 Tab by mouth every 6 hours as needed for Moderate Pain or Fever. 30 Tab 0   • levonorgestrel (MIRENA, 52 MG,) 20 MCG/24HR IUD 1 Each by Intrauterine route Once.        ALLERGIES  No Known Allergies    PHYSICAL EXAM  VITAL SIGNS: /77   Pulse 78   Temp 36.7 °C (98.1 °F) (Temporal)   Resp 16   Ht 1.626 m (5' 4\")   Wt 123.5 kg (272 lb 4.3 oz)   SpO2 96%   BMI 46.73 kg/m²     Constitutional: Alert in no apparent distress.  HENT: No signs of trauma, Bilateral external ears normal, Nose normal. Uvula midline.   Eyes: Pupils are equal and reactive, Conjunctiva normal, Non-icteric.   Neck: Normal range of motion, No tenderness, Supple, No stridor.   Lymphatic: No lymphadenopathy noted.   Cardiovascular: Regular rate and rhythm, no murmurs.   Thorax & Lungs: Normal breath sounds, No respiratory distress, No wheezing, No chest tenderness.   Abdomen: Bowel sounds normal, Soft, " No tenderness, No peritoneal signs, No masses, No pulsatile masses.   Skin: Warm, Dry, No erythema, No rash.   Back: No bony tenderness, No CVA tenderness.   Extremities: Intact distal pulses, No edema, No tenderness, No cyanosis.  Musculoskeletal: Good range of motion in all major joints. No tenderness to palpation or major deformities noted.   Neurologic: Alert , Normal motor function, Normal sensory function, No focal deficits noted.   Psychiatric: Affect normal, Judgment normal, Mood normal.     DIAGNOSTIC STUDIES / PROCEDURES    EKG  12 Lead EKG interpreted by me to show:  Indication: Chest Pain  Normal sinus rhythm  Rate 69  Axis: Normal  Intervals: Normal  Normal T waves  Normal ST segments  My impression of this EKG: No STEMI.     RADIOLOGY  DX-CHEST-PORTABLE (1 VIEW)   Final Result      Negative single view of the chest.        The radiologist's interpretation of all radiological studies have been reviewed by me.    COURSE & MEDICAL DECISION MAKING  Nursing notes, VS, PMSFHx reviewed in chart.    33 y.o. female p/w chief complaint of flu-like symptoms onset 6-7 PM today.     11:30 PM Patient seen and examined at bedside. Ordered for DX-Chest and EKG to evaluate her symptoms.     The differential diagnoses include but are not limited to:     No e/o pna on CXR.    Hx not c/w PE given other infectious sx present  Unremarkable VS upon dc  No e/o stridor or tongue elevation and pt w/ FROM of neck w/o pain, doubt deep space neck infection  No e/o PTA on exam  No rash or e/o cellulitis  After shared decision-making conversation with patient and agreed not to obtain CT scan of chest at this time given significant improvement in chest discomfort and current ability to tolerate p.o.    Suspect hemoptysis secondary to small Inge-Joe tear or gastritis as symptoms have resolved and patient is able to tolerate PO's. She is not anticoagulated and does not have consistent chest pain or throat pain.  Patient given  prescription for Zofran and agrees to follow-up if symptoms worsen    Patient was made aware of  ECG abnormality and plan to follow-up with PCP for repeat ECG    The patient will return for new or worsening symptoms and is stable at the time of discharge.      DISPOSITION:  Patient will be discharged home in stable condition.    FOLLOW UP:  [facility], Emergency Dept  [address] [facility] [id number] [phone]    If symptoms worsen or change    [facility]  [address] [address]  [phone]  In 1 week  Please call your regular doctor to schedule follow up appointment to discuss abnormal ECG and follow up      OUTPATIENT MEDICATIONS:  Discharge Medication List as of 2/15/2019  1:30 AM      START taking these medications    Details   ondansetron (ZOFRAN ODT) 4 MG TABLET DISPERSIBLE Take 1 Tab by mouth every 8 hours as needed for Nausea., Disp-10 Tab, R-0, Print Rx Paper             FINAL IMPRESSION  1. Hematemesis with nausea    2. AV block, 1st degree    3. Influenza          I, Fabi Tomlinson (Scribe), am scribing for, and in the presence of, Joseph Ochoa M.D..    Electronically signed by: Fabi Tomlinson (Michaelibaydin), 2/14/2019    IJoseph M.D. personally performed the services described in this documentation, as scribed by Fabi Tomlinson in my presence, and it is both accurate and complete. C    The note accurately reflects work and decisions made by me.  Joseph Ochoa  2/15/2019  3:42 AM

## 2019-02-15 NOTE — ED TRIAGE NOTES
"Pt n/v x7 days, \" I vomit so bad I get chest pain and gag on my vomit\".  Pt denies chest pain at this time.  States 4 members of house hold have tested positive for flu..  Pt has not but believes she does have it...   Denies sob.    "

## 2019-02-15 NOTE — ED NOTES
"Pt ambulate to room with steady gait, agree with triage notes. Pt C/o intermittent chest pain and can't catch her breathe. Pt able to speak in full sentences. State she has \"bone aches\" intermittently.  "

## 2019-02-15 NOTE — ED NOTES
Discharge instructions given to patient, prescriptions provided, a verbal understanding of all instructions was stated. Pt preferred to walk out accompanied by  VSS,  all belongings accounted for.

## 2019-06-30 ENCOUNTER — HOSPITAL ENCOUNTER (EMERGENCY)
Facility: MEDICAL CENTER | Age: 34
End: 2019-06-30
Attending: EMERGENCY MEDICINE
Payer: COMMERCIAL

## 2019-06-30 VITALS
DIASTOLIC BLOOD PRESSURE: 71 MMHG | TEMPERATURE: 96.9 F | RESPIRATION RATE: 16 BRPM | HEIGHT: 64 IN | SYSTOLIC BLOOD PRESSURE: 137 MMHG | WEIGHT: 280.65 LBS | HEART RATE: 79 BPM | BODY MASS INDEX: 47.91 KG/M2 | OXYGEN SATURATION: 98 %

## 2019-06-30 DIAGNOSIS — L02.11 NECK ABSCESS: ICD-10-CM

## 2019-06-30 LAB — GLUCOSE BLD-MCNC: 107 MG/DL (ref 65–99)

## 2019-06-30 PROCEDURE — 700102 HCHG RX REV CODE 250 W/ 637 OVERRIDE(OP): Performed by: EMERGENCY MEDICINE

## 2019-06-30 PROCEDURE — A9270 NON-COVERED ITEM OR SERVICE: HCPCS | Performed by: EMERGENCY MEDICINE

## 2019-06-30 PROCEDURE — 99284 EMERGENCY DEPT VISIT MOD MDM: CPT

## 2019-06-30 PROCEDURE — 700101 HCHG RX REV CODE 250: Performed by: EMERGENCY MEDICINE

## 2019-06-30 PROCEDURE — 82962 GLUCOSE BLOOD TEST: CPT

## 2019-06-30 PROCEDURE — 303977 HCHG I & D

## 2019-06-30 RX ORDER — SULFAMETHOXAZOLE AND TRIMETHOPRIM 800; 160 MG/1; MG/1
1 TABLET ORAL 2 TIMES DAILY
Qty: 14 TAB | Refills: 0 | Status: ON HOLD | OUTPATIENT
Start: 2019-06-30 | End: 2019-07-04

## 2019-06-30 RX ORDER — CITALOPRAM 20 MG/1
10 TABLET ORAL DAILY
COMMUNITY
End: 2019-06-30

## 2019-06-30 RX ORDER — SPIRONOLACTONE 50 MG/1
50 TABLET, FILM COATED ORAL DAILY
COMMUNITY
End: 2019-06-30

## 2019-06-30 RX ORDER — CITALOPRAM 20 MG/1
10 TABLET ORAL DAILY
COMMUNITY
Start: 2019-06-27 | End: 2021-01-30

## 2019-06-30 RX ORDER — LIDOCAINE HYDROCHLORIDE 20 MG/ML
20 INJECTION, SOLUTION INFILTRATION; PERINEURAL ONCE
Status: COMPLETED | OUTPATIENT
Start: 2019-06-30 | End: 2019-06-30

## 2019-06-30 RX ORDER — IBUPROFEN 600 MG/1
600 TABLET ORAL ONCE
Status: COMPLETED | OUTPATIENT
Start: 2019-06-30 | End: 2019-06-30

## 2019-06-30 RX ORDER — SPIRONOLACTONE 50 MG/1
50 TABLET, FILM COATED ORAL DAILY
COMMUNITY
Start: 2019-06-27 | End: 2021-01-30

## 2019-06-30 RX ADMIN — IBUPROFEN 600 MG: 600 TABLET ORAL at 12:40

## 2019-06-30 RX ADMIN — LIDOCAINE HYDROCHLORIDE 20 ML: 20 INJECTION, SOLUTION INFILTRATION; PERINEURAL at 12:30

## 2019-06-30 NOTE — ED PROVIDER NOTES
ED Provider Note    CHIEF COMPLAINT  Chief Complaint   Patient presents with   • Abscess   • Neck Swelling       HPI  Mariza Jordan is a 33 y.o. female who presents for evaluation of pain redness and swelling the right aspect of her chin.  The patient reports that she thought that she had an ingrown hair on her chin the other day.  She attempted to drain it last night at herself but could not get any pus out due to lack of technique and pain.  She has prior history of diabetes that was treated with metformin but she has been off this medication after diet and exercise lower blood sugar.  She denies high fevers or chills.  No report of pregnancy no high fevers chills no trouble breathing or swallowing    REVIEW OF SYSTEMS  See HPI for further details.  No night sweats weight loss numbness tingling weakness all other systems are negative.     PAST MEDICAL HISTORY  Past Medical History:   Diagnosis Date   • Breath shortness 01/03/2019   • Sleep apnea 01/03/2019    no c pap   • Psychiatric problem 01/03/2019    PTSD    • Diverticulosis    • PCOS (polycystic ovarian syndrome)    • Snoring        FAMILY HISTORY  No history of bleeding disorder    SOCIAL HISTORY  Social History     Social History   • Marital status:      Spouse name: N/A   • Number of children: N/A   • Years of education: N/A     Social History Main Topics   • Smoking status: Current Every Day Smoker     Packs/day: 0.15     Types: Cigarettes   • Smokeless tobacco: Never Used   • Alcohol use Yes      Comment: 1 per week   • Drug use: Yes     Types: Inhaled      Comment: marijuana/vape   • Sexual activity: Not on file     Other Topics Concern   • Not on file     Social History Narrative   • No narrative on file   No IV drug    SURGICAL HISTORY  Past Surgical History:   Procedure Laterality Date   • COLONOSCOPY N/A 1/9/2019    Procedure: COLONOSCOPY;  Surgeon: Graham Wadsworth M.D.;  Location: SURGERY SAME DAY [facility];  Service:  "Gastroenterology   • OTHER  2012    surgery for ectopic preg   • GYN SURGERY  2007/2009    c sections   • OTHER ABDOMINAL SURGERY         CURRENT MEDICATIONS  Home Medications     Reviewed by Janiya Mcgowan R.N. (Registered Nurse) on 06/30/19 at 1159  Med List Status: Complete   Medication Last Dose Status   citalopram (CELEXA) 20 MG Tab 6/30/2019 Active   levonorgestrel (MIRENA, 52 MG,) 20 MCG/24HR IUD  Active   spironolactone (ALDACTONE) 50 MG Tab 6/30/2019 Active                ALLERGIES  No Known Allergies    PHYSICAL EXAM  VITAL SIGNS: /90   Pulse 87   Temp 36.1 °C (96.9 °F) (Temporal)   Resp 16   Ht 1.626 m (5' 4\")   Wt (!) 127.3 kg (280 lb 10.3 oz)   LMP 06/28/2019 (Approximate)   SpO2 96%   BMI 48.17 kg/m²       Constitutional: Well developed, Well nourished, No acute distress, Non-toxic appearance.   HENT: Normocephalic, Atraumatic, Bilateral external ears normal, Oropharynx moist, No oral exudates, Nose normal.  Right aspect of the chin has a 2 x 2 x 3 area of erythema tenderness and fluctuance consistent with a superficial cutaneous abscess  Eyes: PERRLA, EOMI, Conjunctiva normal, No discharge.   Neck: Normal range of motion, No tenderness, Supple, No stridor.    Cardiovascular: Normal heart rate, Normal rhythm, No murmurs, No rubs, No gallops.   Thorax & Lungs: Normal breath sounds, No respiratory distress, No wheezing, No chest tenderness.   Abdomen: Bowel sounds normal, Soft, No tenderness, No masses, No pulsatile masses.   Skin: Warm, Dry, No erythema, No rash.   Back: No tenderness, No CVA tenderness.   Extremities: Intact distal pulses, No edema, No tenderness, No cyanosis, No clubbing.   Musculoskeletal: Good range of motion in all major joints. No tenderness to palpation or major deformities noted.   Neurologic: Alert & oriented x 3, Normal motor function, Normal sensory function, No focal deficits noted.   Psychiatric: Anxious    Results for orders placed or performed during " the hospital encounter of 06/30/19   ACCU-CHEK GLUCOSE   Result Value Ref Range    Glucose - Accu-Ck 107 (H) 65 - 99 mg/dL        COURSE & MEDICAL DECISION MAKING  Pertinent Labs & Imaging studies reviewed. (See chart for details)  Physician procedure: Incision and drainage of facial cutaneous abscess.  Verbal consent was obtained.  There is prepped 3 times with rubbing alcohol.  A total of 4 cc of 2% lidocaine without epinephrine was infiltrated into the skin and deeper tissues for anesthesia.  Standard sterile prep and drape an 11 blade scalpel was used to make a 7 mm incision over the point of maximal fluctuance.  Around 1 cc of blood and pus was evacuated loculations were broken up with a blunt instrument.  , packing gauze was placed into the wound and a sterile dressing was applied.    Patient has an early cutaneous abscess of the chin.  We opened up the pocket I made a relatively small incision due to the cosmetically sensitive area of the abscess.  We will start her on Bactrim.  I recommended following up with her PCP tomorrow or here for recheck    FINAL IMPRESSION  1.   1. Neck abscess               Electronically signed by: Gaudencio Nieves, 6/30/2019 12:06 PM

## 2019-06-30 NOTE — ED NOTES
Pt discharged with instructions and script x 1 .  Pt verbalizes the understanding of instructions. Pt ambulated out of ER without any difficulty.

## 2019-06-30 NOTE — ED TRIAGE NOTES
Ambulates to triage  Chief Complaint   Patient presents with   • Abscess   • Neck Swelling     Pt noticed slight swelling yesterday, bt was much worse this morning, large fluid collection under her chin.  C/o of headache and ear pain.

## 2019-07-01 ENCOUNTER — APPOINTMENT (OUTPATIENT)
Dept: RADIOLOGY | Facility: MEDICAL CENTER | Age: 34
DRG: 603 | End: 2019-07-01
Attending: EMERGENCY MEDICINE
Payer: MEDICAID

## 2019-07-01 ENCOUNTER — HOSPITAL ENCOUNTER (INPATIENT)
Facility: MEDICAL CENTER | Age: 34
LOS: 3 days | DRG: 603 | End: 2019-07-04
Attending: EMERGENCY MEDICINE | Admitting: INTERNAL MEDICINE
Payer: MEDICAID

## 2019-07-01 DIAGNOSIS — L03.221 CELLULITIS, NECK: ICD-10-CM

## 2019-07-01 DIAGNOSIS — L03.221 CELLULITIS OF NECK: ICD-10-CM

## 2019-07-01 LAB
ANION GAP SERPL CALC-SCNC: 12 MMOL/L (ref 0–11.9)
BASOPHILS # BLD AUTO: 0.3 % (ref 0–1.8)
BASOPHILS # BLD: 0.05 K/UL (ref 0–0.12)
BUN SERPL-MCNC: 16 MG/DL (ref 8–22)
CALCIUM SERPL-MCNC: 10 MG/DL (ref 8.5–10.5)
CHLORIDE SERPL-SCNC: 102 MMOL/L (ref 96–112)
CO2 SERPL-SCNC: 22 MMOL/L (ref 20–33)
CREAT SERPL-MCNC: 0.74 MG/DL (ref 0.5–1.4)
EOSINOPHIL # BLD AUTO: 0.09 K/UL (ref 0–0.51)
EOSINOPHIL NFR BLD: 0.6 % (ref 0–6.9)
ERYTHROCYTE [DISTWIDTH] IN BLOOD BY AUTOMATED COUNT: 40.6 FL (ref 35.9–50)
GLUCOSE SERPL-MCNC: 106 MG/DL (ref 65–99)
HCG SERPL QL: NEGATIVE
HCT VFR BLD AUTO: 44.4 % (ref 37–47)
HGB BLD-MCNC: 15 G/DL (ref 12–16)
IMM GRANULOCYTES # BLD AUTO: 0.07 K/UL (ref 0–0.11)
IMM GRANULOCYTES NFR BLD AUTO: 0.4 % (ref 0–0.9)
LYMPHOCYTES # BLD AUTO: 1.92 K/UL (ref 1–4.8)
LYMPHOCYTES NFR BLD: 12.1 % (ref 22–41)
MAGNESIUM SERPL-MCNC: 2.1 MG/DL (ref 1.5–2.5)
MCH RBC QN AUTO: 29.4 PG (ref 27–33)
MCHC RBC AUTO-ENTMCNC: 33.8 G/DL (ref 33.6–35)
MCV RBC AUTO: 87.1 FL (ref 81.4–97.8)
MONOCYTES # BLD AUTO: 1.18 K/UL (ref 0–0.85)
MONOCYTES NFR BLD AUTO: 7.4 % (ref 0–13.4)
NEUTROPHILS # BLD AUTO: 12.58 K/UL (ref 2–7.15)
NEUTROPHILS NFR BLD: 79.2 % (ref 44–72)
NRBC # BLD AUTO: 0 K/UL
NRBC BLD-RTO: 0 /100 WBC
PLATELET # BLD AUTO: 357 K/UL (ref 164–446)
PMV BLD AUTO: 9.5 FL (ref 9–12.9)
POTASSIUM SERPL-SCNC: 4.1 MMOL/L (ref 3.6–5.5)
RBC # BLD AUTO: 5.1 M/UL (ref 4.2–5.4)
SODIUM SERPL-SCNC: 136 MMOL/L (ref 135–145)
WBC # BLD AUTO: 15.9 K/UL (ref 4.8–10.8)

## 2019-07-01 PROCEDURE — 99285 EMERGENCY DEPT VISIT HI MDM: CPT

## 2019-07-01 PROCEDURE — 700117 HCHG RX CONTRAST REV CODE 255: Performed by: EMERGENCY MEDICINE

## 2019-07-01 PROCEDURE — 80048 BASIC METABOLIC PNL TOTAL CA: CPT

## 2019-07-01 PROCEDURE — 770006 HCHG ROOM/CARE - MED/SURG/GYN SEMI*

## 2019-07-01 PROCEDURE — 99406 BEHAV CHNG SMOKING 3-10 MIN: CPT | Performed by: INTERNAL MEDICINE

## 2019-07-01 PROCEDURE — 700105 HCHG RX REV CODE 258: Performed by: INTERNAL MEDICINE

## 2019-07-01 PROCEDURE — 96365 THER/PROPH/DIAG IV INF INIT: CPT

## 2019-07-01 PROCEDURE — A9270 NON-COVERED ITEM OR SERVICE: HCPCS | Performed by: INTERNAL MEDICINE

## 2019-07-01 PROCEDURE — 700102 HCHG RX REV CODE 250 W/ 637 OVERRIDE(OP): Performed by: INTERNAL MEDICINE

## 2019-07-01 PROCEDURE — 96375 TX/PRO/DX INJ NEW DRUG ADDON: CPT

## 2019-07-01 PROCEDURE — 700101 HCHG RX REV CODE 250: Performed by: EMERGENCY MEDICINE

## 2019-07-01 PROCEDURE — 99223 1ST HOSP IP/OBS HIGH 75: CPT | Mod: 25 | Performed by: INTERNAL MEDICINE

## 2019-07-01 PROCEDURE — 84703 CHORIONIC GONADOTROPIN ASSAY: CPT

## 2019-07-01 PROCEDURE — 700111 HCHG RX REV CODE 636 W/ 250 OVERRIDE (IP): Performed by: INTERNAL MEDICINE

## 2019-07-01 PROCEDURE — 85025 COMPLETE CBC W/AUTO DIFF WBC: CPT

## 2019-07-01 PROCEDURE — 700111 HCHG RX REV CODE 636 W/ 250 OVERRIDE (IP): Performed by: EMERGENCY MEDICINE

## 2019-07-01 PROCEDURE — 70491 CT SOFT TISSUE NECK W/DYE: CPT

## 2019-07-01 PROCEDURE — 83735 ASSAY OF MAGNESIUM: CPT

## 2019-07-01 RX ORDER — CITALOPRAM 20 MG/1
10 TABLET ORAL DAILY
Status: DISCONTINUED | OUTPATIENT
Start: 2019-07-02 | End: 2019-07-04 | Stop reason: HOSPADM

## 2019-07-01 RX ORDER — IBUPROFEN 800 MG/1
400 TABLET ORAL EVERY 8 HOURS PRN
Status: DISCONTINUED | OUTPATIENT
Start: 2019-07-01 | End: 2019-07-02

## 2019-07-01 RX ORDER — KETOROLAC TROMETHAMINE 30 MG/ML
30 INJECTION, SOLUTION INTRAMUSCULAR; INTRAVENOUS ONCE
Status: COMPLETED | OUTPATIENT
Start: 2019-07-01 | End: 2019-07-01

## 2019-07-01 RX ORDER — CEFAZOLIN SODIUM 2 G/100ML
2 INJECTION, SOLUTION INTRAVENOUS EVERY 8 HOURS
Status: DISCONTINUED | OUTPATIENT
Start: 2019-07-01 | End: 2019-07-01

## 2019-07-01 RX ORDER — SPIRONOLACTONE 25 MG/1
50 TABLET ORAL DAILY
Status: DISCONTINUED | OUTPATIENT
Start: 2019-07-02 | End: 2019-07-04 | Stop reason: HOSPADM

## 2019-07-01 RX ORDER — BISACODYL 10 MG
10 SUPPOSITORY, RECTAL RECTAL
Status: DISCONTINUED | OUTPATIENT
Start: 2019-07-01 | End: 2019-07-04 | Stop reason: HOSPADM

## 2019-07-01 RX ORDER — NICOTINE 21 MG/24HR
21 PATCH, TRANSDERMAL 24 HOURS TRANSDERMAL
Status: DISCONTINUED | OUTPATIENT
Start: 2019-07-02 | End: 2019-07-04 | Stop reason: HOSPADM

## 2019-07-01 RX ORDER — SODIUM CHLORIDE 9 MG/ML
INJECTION, SOLUTION INTRAVENOUS CONTINUOUS
Status: DISCONTINUED | OUTPATIENT
Start: 2019-07-01 | End: 2019-07-03

## 2019-07-01 RX ORDER — CLINDAMYCIN PHOSPHATE 900 MG/50ML
900 INJECTION, SOLUTION INTRAVENOUS ONCE
Status: COMPLETED | OUTPATIENT
Start: 2019-07-01 | End: 2019-07-01

## 2019-07-01 RX ORDER — ONDANSETRON 2 MG/ML
4 INJECTION INTRAMUSCULAR; INTRAVENOUS EVERY 4 HOURS PRN
Status: DISCONTINUED | OUTPATIENT
Start: 2019-07-01 | End: 2019-07-04 | Stop reason: HOSPADM

## 2019-07-01 RX ORDER — ACETAMINOPHEN 325 MG/1
650 TABLET ORAL EVERY 6 HOURS PRN
Status: DISCONTINUED | OUTPATIENT
Start: 2019-07-01 | End: 2019-07-04 | Stop reason: HOSPADM

## 2019-07-01 RX ORDER — ONDANSETRON 4 MG/1
4 TABLET, ORALLY DISINTEGRATING ORAL EVERY 4 HOURS PRN
Status: DISCONTINUED | OUTPATIENT
Start: 2019-07-01 | End: 2019-07-04 | Stop reason: HOSPADM

## 2019-07-01 RX ORDER — AMOXICILLIN 250 MG
2 CAPSULE ORAL 2 TIMES DAILY
Status: DISCONTINUED | OUTPATIENT
Start: 2019-07-01 | End: 2019-07-04 | Stop reason: HOSPADM

## 2019-07-01 RX ORDER — POLYETHYLENE GLYCOL 3350 17 G/17G
1 POWDER, FOR SOLUTION ORAL
Status: DISCONTINUED | OUTPATIENT
Start: 2019-07-01 | End: 2019-07-04 | Stop reason: HOSPADM

## 2019-07-01 RX ORDER — DOXYCYCLINE 100 MG/1
100 TABLET ORAL EVERY 12 HOURS
Status: DISCONTINUED | OUTPATIENT
Start: 2019-07-01 | End: 2019-07-04 | Stop reason: HOSPADM

## 2019-07-01 RX ADMIN — KETOROLAC TROMETHAMINE 30 MG: 30 INJECTION, SOLUTION INTRAMUSCULAR at 16:38

## 2019-07-01 RX ADMIN — CLINDAMYCIN IN 5 PERCENT DEXTROSE 900 MG: 18 INJECTION, SOLUTION INTRAVENOUS at 20:57

## 2019-07-01 RX ADMIN — IOHEXOL 100 ML: 350 INJECTION, SOLUTION INTRAVENOUS at 17:47

## 2019-07-01 RX ADMIN — ONDANSETRON 4 MG: 4 TABLET, ORALLY DISINTEGRATING ORAL at 23:26

## 2019-07-01 RX ADMIN — SODIUM CHLORIDE: 9 INJECTION, SOLUTION INTRAVENOUS at 21:55

## 2019-07-01 RX ADMIN — PIPERACILLIN AND TAZOBACTAM 4.5 G: 4; .5 INJECTION, POWDER, LYOPHILIZED, FOR SOLUTION INTRAVENOUS; PARENTERAL at 22:20

## 2019-07-01 RX ADMIN — DOXYCYCLINE 100 MG: 100 TABLET, FILM COATED ORAL at 21:07

## 2019-07-01 RX ADMIN — IBUPROFEN 400 MG: 800 TABLET ORAL at 23:26

## 2019-07-01 ASSESSMENT — COGNITIVE AND FUNCTIONAL STATUS - GENERAL
DAILY ACTIVITIY SCORE: 24
SUGGESTED CMS G CODE MODIFIER MOBILITY: CH
SUGGESTED CMS G CODE MODIFIER DAILY ACTIVITY: CH
MOBILITY SCORE: 24

## 2019-07-01 ASSESSMENT — LIFESTYLE VARIABLES
TOTAL SCORE: 0
HOW MANY TIMES IN THE PAST YEAR HAVE YOU HAD 5 OR MORE DRINKS IN A DAY: 0
EVER FELT BAD OR GUILTY ABOUT YOUR DRINKING: NO
HAVE PEOPLE ANNOYED YOU BY CRITICIZING YOUR DRINKING: NO
ON A TYPICAL DAY WHEN YOU DRINK ALCOHOL HOW MANY DRINKS DO YOU HAVE: 2
CONSUMPTION TOTAL: NEGATIVE
TOTAL SCORE: 0
EVER_SMOKED: YES
TOTAL SCORE: 0
AVERAGE NUMBER OF DAYS PER WEEK YOU HAVE A DRINK CONTAINING ALCOHOL: 3
HAVE YOU EVER FELT YOU SHOULD CUT DOWN ON YOUR DRINKING: NO
ALCOHOL_USE: YES
EVER HAD A DRINK FIRST THING IN THE MORNING TO STEADY YOUR NERVES TO GET RID OF A HANGOVER: NO

## 2019-07-01 ASSESSMENT — COPD QUESTIONNAIRES
COPD SCREENING SCORE: 0
IN THE PAST 12 MONTHS DO YOU DO LESS THAN YOU USED TO BECAUSE OF YOUR BREATHING PROBLEMS: DISAGREE/UNSURE
DO YOU EVER COUGH UP ANY MUCUS OR PHLEGM?: NO/ONLY WITH OCCASIONAL COLDS OR INFECTIONS
DURING THE PAST 4 WEEKS HOW MUCH DID YOU FEEL SHORT OF BREATH: NONE/LITTLE OF THE TIME
HAVE YOU SMOKED AT LEAST 100 CIGARETTES IN YOUR ENTIRE LIFE: NO/DON'T KNOW

## 2019-07-01 ASSESSMENT — PATIENT HEALTH QUESTIONNAIRE - PHQ9
SUM OF ALL RESPONSES TO PHQ9 QUESTIONS 1 AND 2: 0
1. LITTLE INTEREST OR PLEASURE IN DOING THINGS: NOT AT ALL
2. FEELING DOWN, DEPRESSED, IRRITABLE, OR HOPELESS: NOT AT ALL

## 2019-07-01 NOTE — ED PROVIDER NOTES
ED Provider  Scribed for Issac Lim D.O. by Daniela Singh. 7/1/2019  3:50 PM    Means of arrival: Walk in   History obtained from: Patient  History limited by: None    CHIEF COMPLAINT  Chief Complaint   Patient presents with   • Wound Check     was packed yesterday, swelling and pain has increased      HPI  Mariza Jordan is a 33 y.o. female who presents for a wound check. The patient had an abscess on the right side of her chin which was drained and packed yesterday; however, she states that the swelling has progressively worsened. She did not have any swelling to the left side of her neck at the time that the abscess was drainage. Since then she has noticed that the left side of her face and neck have become more swollen. She endorses sweats, chills and a headache. At this time, she feels nauseated. Denies fever. The patient has a history of obstructive sleep apnea which has worsened secondary to facial swelling. She was given antibiotics during that time has taken the first dose thus far.       REVIEW OF SYSTEMS  See HPI for further details. All other systems are negative.     PAST MEDICAL HISTORY   has a past medical history of Breath shortness (01/03/2019); Diverticulosis; PCOS (polycystic ovarian syndrome); Psychiatric problem (01/03/2019); Sleep apnea (01/03/2019); and Snoring.    SOCIAL HISTORY  Social History     Social History Main Topics   • Smoking status: Current Every Day Smoker     Packs/day: 0.15     Types: Cigarettes   • Smokeless tobacco: Never Used   • Alcohol use Yes      Comment: 1 per week   • Drug use: Yes     Types: Inhaled      Comment: marijuana/vape   • Sexual activity: None noted       SURGICAL HISTORY   has a past surgical history that includes other abdominal surgery; gyn surgery (2007/2009); other (2012); and colonoscopy (N/A, 1/9/2019).    CURRENT MEDICATIONS  Home Medications     Reviewed by Maria E Almaguer R.N. (Registered Nurse) on 07/01/19 at 7253  Med List  "Status: Partial   Medication Last Dose Status   citalopram (CELEXA) 20 MG Tab 7/1/2019 Active   levonorgestrel (MIRENA, 52 MG,) 20 MCG/24HR IUD 7/1/2019 Active   spironolactone (ALDACTONE) 50 MG Tab 7/1/2019 Active   sulfamethoxazole-trimethoprim (BACTRIM DS) 800-160 MG tablet 7/1/2019 Active              ALLERGIES  No Known Allergies    PHYSICAL EXAM  VITAL SIGNS: /96   Pulse 99   Temp 36.5 °C (97.7 °F) (Temporal)   Resp 14   Ht 1.626 m (5' 4\")   Wt (!) 125.5 kg (276 lb 10.8 oz)   LMP 06/28/2019 (Approximate)   SpO2 96%   BMI 47.49 kg/m²   Constitutional: Alert in no apparent distress. Moderately obese.   HENT: No signs of trauma, mucous membranes are moist. No erythema or swelling to posterior pharynx.   Eyes: Conjunctiva normal, Non-icteric.   Neck: Normal range of motion, Supple. Right neck has swollen indurated erythematous area with central incision. There is dry crusted packing. Packing was removed and a small amount of purulent drainage was manually expressed.   Lymphatic: No lymphadenopathy noted.   Cardiovascular: Regular rate and rhythm, no murmurs.   Thorax & Lungs: Normal breath sounds, No respiratory distress, No wheezing, No chest tenderness.   Abdomen: Bowel sounds normal, Soft, No tenderness, No masses, No pulsatile masses. No peritoneal signs.  Skin: Warm, Dry, normal color.   Back: No bony tenderness, No CVA tenderness.   Extremities: No edema, No tenderness, No cyanosis  Musculoskeletal: Good range of motion in all major joints. No tenderness to palpation or major deformities noted.   Neurologic: Alert and oriented x4, Normal motor function, Normal sensory function, No focal deficits noted.   Psychiatric: Affect normal, Judgment normal, Mood normal.     MEDICAL DECISION MAKING  This is a 33 y.o. female who presents with worsening swelling and redness of the right neck.  She had an I&D done yesterday her previous records were reviewed to show this.  This packing was removed by me, " and there was mild amount of purulent drainage still expressed manually.  The area is indurated, tender, and erythematous.  This patient states it is worse.  CT shows no further abscess but there is cellulitis her white blood cell count is elevated.  With her failing outpatient treatment IV antibiotics are initiated.  I spoke with the hospitalist for admission and she will be admitted for further evaluation and treatment.    DIAGNOSTIC STUDIES / PROCEDURES    LABS  Results for orders placed or performed during the hospital encounter of 07/01/19   CBC WITH DIFFERENTIAL   Result Value Ref Range    WBC 15.9 (H) 4.8 - 10.8 K/uL    RBC 5.10 4.20 - 5.40 M/uL    Hemoglobin 15.0 12.0 - 16.0 g/dL    Hematocrit 44.4 37.0 - 47.0 %    MCV 87.1 81.4 - 97.8 fL    MCH 29.4 27.0 - 33.0 pg    MCHC 33.8 33.6 - 35.0 g/dL    RDW 40.6 35.9 - 50.0 fL    Platelet Count 357 164 - 446 K/uL    MPV 9.5 9.0 - 12.9 fL    Neutrophils-Polys 79.20 (H) 44.00 - 72.00 %    Lymphocytes 12.10 (L) 22.00 - 41.00 %    Monocytes 7.40 0.00 - 13.40 %    Eosinophils 0.60 0.00 - 6.90 %    Basophils 0.30 0.00 - 1.80 %    Immature Granulocytes 0.40 0.00 - 0.90 %    Nucleated RBC 0.00 /100 WBC    Neutrophils (Absolute) 12.58 (H) 2.00 - 7.15 K/uL    Lymphs (Absolute) 1.92 1.00 - 4.80 K/uL    Monos (Absolute) 1.18 (H) 0.00 - 0.85 K/uL    Eos (Absolute) 0.09 0.00 - 0.51 K/uL    Baso (Absolute) 0.05 0.00 - 0.12 K/uL    Immature Granulocytes (abs) 0.07 0.00 - 0.11 K/uL    NRBC (Absolute) 0.00 K/uL   BASIC METABOLIC PANEL   Result Value Ref Range    Sodium 136 135 - 145 mmol/L    Potassium 4.1 3.6 - 5.5 mmol/L    Chloride 102 96 - 112 mmol/L    Co2 22 20 - 33 mmol/L    Glucose 106 (H) 65 - 99 mg/dL    Bun 16 8 - 22 mg/dL    Creatinine 0.74 0.50 - 1.40 mg/dL    Calcium 10.0 8.5 - 10.5 mg/dL    Anion Gap 12.0 (H) 0.0 - 11.9   HCG QUAL SERUM   Result Value Ref Range    Beta-Hcg Qualitative Serum Negative Negative   ESTIMATED GFR   Result Value Ref Range    GFR If   American >60 >60 mL/min/1.73 m 2    GFR If Non African American >60 >60 mL/min/1.73 m 2     All labs reviewed by me.    RADIOLOGY  CT-SOFT TISSUE NECK WITH   Final Result      1.  Cellulitis of the right upper neck with associated skin wound. No drainable subcutaneous abscess.   2.  Reactive level 1 lymphadenopathy.        The radiologist's interpretations of all radiological studies have been reviewed by me.      COURSE  Pertinent Labs & Imaging studies reviewed. (See chart for details)    3:50 PM - Patient seen and examined at bedside. Discussed plan of care. Patient will be treated with Toradol 30 mg. Ordered for CT soft tissue neck, CBC, BMP, HCG qualitative serum to evaluate her symptoms.         FINAL IMPRESSION  1. Cellulitis, neck         Daniela FORD (Scribe), am scribing for, and in the presence of, Issac Lim D.O..    Electronically signed by: Daniela Singh (Scribe), 7/1/2019    IIssac D.O. personally performed the services described in this documentation, as scribed by Daniela Singh in my presence, and it is both accurate and complete.    C    The note accurately reflects work and decisions made by me.  Issac Lim  7/1/2019  6:26 PM

## 2019-07-01 NOTE — ED TRIAGE NOTES
"PT ambulated to triage for a wound check, per report pt was here yesterday and had a wound packed.  PT reports that the swelling and pain has increased since yesterday. Pt reports she has been taking the antibiotics that were prescribed.    Chief Complaint   Patient presents with   • Wound Check     was packed yesterday, swelling and pain has increased      /96   Pulse 99   Temp 36.5 °C (97.7 °F) (Temporal)   Resp 14   Ht 1.626 m (5' 4\")   Wt (!) 125.5 kg (276 lb 10.8 oz)   SpO2 96%     "

## 2019-07-02 ENCOUNTER — APPOINTMENT (OUTPATIENT)
Dept: RADIOLOGY | Facility: MEDICAL CENTER | Age: 34
DRG: 603 | End: 2019-07-02
Attending: INTERNAL MEDICINE
Payer: MEDICAID

## 2019-07-02 PROBLEM — L03.90 CELLULITIS: Status: ACTIVE | Noted: 2019-07-02

## 2019-07-02 PROBLEM — F17.200 NICOTINE DEPENDENCE: Status: ACTIVE | Noted: 2019-07-02

## 2019-07-02 PROBLEM — E28.2 PCOS (POLYCYSTIC OVARIAN SYNDROME): Status: ACTIVE | Noted: 2019-07-02

## 2019-07-02 LAB
ALBUMIN SERPL BCP-MCNC: 4.2 G/DL (ref 3.2–4.9)
ALBUMIN/GLOB SERPL: 1.4 G/DL
ALP SERPL-CCNC: 49 U/L (ref 30–99)
ALT SERPL-CCNC: 12 U/L (ref 2–50)
ANION GAP SERPL CALC-SCNC: 8 MMOL/L (ref 0–11.9)
AST SERPL-CCNC: 11 U/L (ref 12–45)
BASOPHILS # BLD AUTO: 0.4 % (ref 0–1.8)
BASOPHILS # BLD: 0.06 K/UL (ref 0–0.12)
BILIRUB SERPL-MCNC: 0.7 MG/DL (ref 0.1–1.5)
BUN SERPL-MCNC: 18 MG/DL (ref 8–22)
CALCIUM SERPL-MCNC: 9.3 MG/DL (ref 8.5–10.5)
CHLORIDE SERPL-SCNC: 104 MMOL/L (ref 96–112)
CO2 SERPL-SCNC: 24 MMOL/L (ref 20–33)
CREAT SERPL-MCNC: 0.84 MG/DL (ref 0.5–1.4)
EKG IMPRESSION: NORMAL
EOSINOPHIL # BLD AUTO: 0.16 K/UL (ref 0–0.51)
EOSINOPHIL NFR BLD: 1 % (ref 0–6.9)
ERYTHROCYTE [DISTWIDTH] IN BLOOD BY AUTOMATED COUNT: 42 FL (ref 35.9–50)
GLOBULIN SER CALC-MCNC: 3.1 G/DL (ref 1.9–3.5)
GLUCOSE SERPL-MCNC: 95 MG/DL (ref 65–99)
GRAM STN SPEC: NORMAL
HCT VFR BLD AUTO: 43.2 % (ref 37–47)
HGB BLD-MCNC: 14.1 G/DL (ref 12–16)
IMM GRANULOCYTES # BLD AUTO: 0.09 K/UL (ref 0–0.11)
IMM GRANULOCYTES NFR BLD AUTO: 0.6 % (ref 0–0.9)
LYMPHOCYTES # BLD AUTO: 3.36 K/UL (ref 1–4.8)
LYMPHOCYTES NFR BLD: 21.4 % (ref 22–41)
MCH RBC QN AUTO: 28.7 PG (ref 27–33)
MCHC RBC AUTO-ENTMCNC: 32.6 G/DL (ref 33.6–35)
MCV RBC AUTO: 88 FL (ref 81.4–97.8)
MONOCYTES # BLD AUTO: 1.24 K/UL (ref 0–0.85)
MONOCYTES NFR BLD AUTO: 7.9 % (ref 0–13.4)
NEUTROPHILS # BLD AUTO: 10.77 K/UL (ref 2–7.15)
NEUTROPHILS NFR BLD: 68.7 % (ref 44–72)
NRBC # BLD AUTO: 0 K/UL
NRBC BLD-RTO: 0 /100 WBC
PLATELET # BLD AUTO: 324 K/UL (ref 164–446)
PMV BLD AUTO: 9 FL (ref 9–12.9)
POTASSIUM SERPL-SCNC: 3.9 MMOL/L (ref 3.6–5.5)
PROT SERPL-MCNC: 7.3 G/DL (ref 6–8.2)
RBC # BLD AUTO: 4.91 M/UL (ref 4.2–5.4)
SIGNIFICANT IND 70042: NORMAL
SITE SITE: NORMAL
SODIUM SERPL-SCNC: 136 MMOL/L (ref 135–145)
SOURCE SOURCE: NORMAL
TROPONIN I SERPL-MCNC: <0.01 NG/ML (ref 0–0.04)
WBC # BLD AUTO: 15.7 K/UL (ref 4.8–10.8)

## 2019-07-02 PROCEDURE — 99233 SBSQ HOSP IP/OBS HIGH 50: CPT | Performed by: INTERNAL MEDICINE

## 2019-07-02 PROCEDURE — 84484 ASSAY OF TROPONIN QUANT: CPT

## 2019-07-02 PROCEDURE — 700111 HCHG RX REV CODE 636 W/ 250 OVERRIDE (IP): Performed by: INTERNAL MEDICINE

## 2019-07-02 PROCEDURE — 87186 SC STD MICRODIL/AGAR DIL: CPT

## 2019-07-02 PROCEDURE — 36415 COLL VENOUS BLD VENIPUNCTURE: CPT

## 2019-07-02 PROCEDURE — 700102 HCHG RX REV CODE 250 W/ 637 OVERRIDE(OP): Performed by: INTERNAL MEDICINE

## 2019-07-02 PROCEDURE — 770006 HCHG ROOM/CARE - MED/SURG/GYN SEMI*

## 2019-07-02 PROCEDURE — 700105 HCHG RX REV CODE 258: Performed by: INTERNAL MEDICINE

## 2019-07-02 PROCEDURE — 93010 ELECTROCARDIOGRAM REPORT: CPT | Performed by: INTERNAL MEDICINE

## 2019-07-02 PROCEDURE — A9270 NON-COVERED ITEM OR SERVICE: HCPCS | Performed by: INTERNAL MEDICINE

## 2019-07-02 PROCEDURE — 87070 CULTURE OTHR SPECIMN AEROBIC: CPT

## 2019-07-02 PROCEDURE — 80053 COMPREHEN METABOLIC PANEL: CPT

## 2019-07-02 PROCEDURE — 87040 BLOOD CULTURE FOR BACTERIA: CPT | Mod: 91

## 2019-07-02 PROCEDURE — 93005 ELECTROCARDIOGRAM TRACING: CPT | Performed by: INTERNAL MEDICINE

## 2019-07-02 PROCEDURE — 87205 SMEAR GRAM STAIN: CPT

## 2019-07-02 PROCEDURE — 85025 COMPLETE CBC W/AUTO DIFF WBC: CPT

## 2019-07-02 PROCEDURE — 70355 PANORAMIC X-RAY OF JAWS: CPT

## 2019-07-02 PROCEDURE — 87077 CULTURE AEROBIC IDENTIFY: CPT

## 2019-07-02 RX ORDER — IBUPROFEN 800 MG/1
800 TABLET ORAL EVERY 8 HOURS PRN
Status: DISCONTINUED | OUTPATIENT
Start: 2019-07-02 | End: 2019-07-04 | Stop reason: HOSPADM

## 2019-07-02 RX ORDER — ACETAMINOPHEN 325 MG/1
650 TABLET ORAL EVERY 6 HOURS PRN
Status: DISCONTINUED | OUTPATIENT
Start: 2019-07-02 | End: 2019-07-04 | Stop reason: HOSPADM

## 2019-07-02 RX ORDER — DOXYCYCLINE 100 MG/1
100 TABLET ORAL ONCE
Status: COMPLETED | OUTPATIENT
Start: 2019-07-02 | End: 2019-07-02

## 2019-07-02 RX ADMIN — NICOTINE 21 MG: 21 PATCH, EXTENDED RELEASE TRANSDERMAL at 05:30

## 2019-07-02 RX ADMIN — PIPERACILLIN AND TAZOBACTAM 4.5 G: 4; .5 INJECTION, POWDER, LYOPHILIZED, FOR SOLUTION INTRAVENOUS; PARENTERAL at 05:28

## 2019-07-02 RX ADMIN — ENOXAPARIN SODIUM 40 MG: 100 INJECTION SUBCUTANEOUS at 05:31

## 2019-07-02 RX ADMIN — ONDANSETRON 4 MG: 4 TABLET, ORALLY DISINTEGRATING ORAL at 08:27

## 2019-07-02 RX ADMIN — DOXYCYCLINE 100 MG: 100 TABLET, FILM COATED ORAL at 18:37

## 2019-07-02 RX ADMIN — PIPERACILLIN AND TAZOBACTAM 4.5 G: 4; .5 INJECTION, POWDER, LYOPHILIZED, FOR SOLUTION INTRAVENOUS; PARENTERAL at 00:35

## 2019-07-02 RX ADMIN — ACETAMINOPHEN 650 MG: 325 TABLET, FILM COATED ORAL at 19:51

## 2019-07-02 RX ADMIN — PIPERACILLIN AND TAZOBACTAM 4.5 G: 4; .5 INJECTION, POWDER, LYOPHILIZED, FOR SOLUTION INTRAVENOUS; PARENTERAL at 21:26

## 2019-07-02 RX ADMIN — ONDANSETRON 4 MG: 2 INJECTION INTRAMUSCULAR; INTRAVENOUS at 13:57

## 2019-07-02 RX ADMIN — IBUPROFEN 400 MG: 800 TABLET ORAL at 08:27

## 2019-07-02 RX ADMIN — ONDANSETRON 4 MG: 4 TABLET, ORALLY DISINTEGRATING ORAL at 19:51

## 2019-07-02 RX ADMIN — IBUPROFEN 800 MG: 800 TABLET ORAL at 21:20

## 2019-07-02 RX ADMIN — DOXYCYCLINE 100 MG: 100 TABLET, FILM COATED ORAL at 08:53

## 2019-07-02 RX ADMIN — CITALOPRAM HYDROBROMIDE 10 MG: 20 TABLET ORAL at 08:53

## 2019-07-02 RX ADMIN — PIPERACILLIN AND TAZOBACTAM 4.5 G: 4; .5 INJECTION, POWDER, LYOPHILIZED, FOR SOLUTION INTRAVENOUS; PARENTERAL at 13:57

## 2019-07-02 ASSESSMENT — ENCOUNTER SYMPTOMS
TINGLING: 0
DIARRHEA: 0
CONSTIPATION: 0
BLURRED VISION: 0
TREMORS: 0
PALPITATIONS: 0
HALLUCINATIONS: 0
HEADACHES: 0
ABDOMINAL PAIN: 0
MYALGIAS: 0
FOCAL WEAKNESS: 0
DOUBLE VISION: 0
SPEECH CHANGE: 0
PHOTOPHOBIA: 0
COUGH: 0
VOMITING: 0
ORTHOPNEA: 0
NAUSEA: 0
SHORTNESS OF BREATH: 0
EYE PAIN: 0
SENSORY CHANGE: 0
SPUTUM PRODUCTION: 0
SORE THROAT: 1
BACK PAIN: 0
WEIGHT LOSS: 0
NECK PAIN: 0
CHILLS: 0
NAUSEA: 1
DIZZINESS: 0
FEVER: 0
DEPRESSION: 0

## 2019-07-02 ASSESSMENT — PATIENT HEALTH QUESTIONNAIRE - PHQ9
SUM OF ALL RESPONSES TO PHQ9 QUESTIONS 1 AND 2: 0
2. FEELING DOWN, DEPRESSED, IRRITABLE, OR HOPELESS: NOT AT ALL
1. LITTLE INTEREST OR PLEASURE IN DOING THINGS: NOT AT ALL

## 2019-07-02 ASSESSMENT — LIFESTYLE VARIABLES: SUBSTANCE_ABUSE: 1

## 2019-07-02 NOTE — PROGRESS NOTES
"At 1540 Patient reported \" I am having chest pressure. I don't know if it's chest pressure or my left side abdomen. My hands feel sweaty and I feel some SOB.\" VS: Temp:97.5; BP:107/59; HR:74; RR:22; SPO2 97% roomair.  reported and assess patient at bedside. New order for stat EKG and trop made. After five minutes patient reported symptoms resolved. Lab and EKG tech called.   "

## 2019-07-02 NOTE — PROGRESS NOTES
Rec'd pt via gurney, escorted by pt transport. Assumed pt care. Assessment completed. AA&OX4. Denies pain at this time. No s/s of discomfort or distress. Pt is up self, ambulated from the hallway to the bed, maintained steady gait. Dressing in place to right side of neck, drainage noted on dressing, will change. Oriented pt to S524-1, explained call light use, visiting hours, smoking policy, plan of care. Pt verbalized understanding. Bed in lowest position, bed locked, treaded socks in place, RN and CNA numbers provided, call light within reach.

## 2019-07-02 NOTE — CARE PLAN
Problem: Infection  Goal: Will remain free from infection  Outcome: PROGRESSING AS EXPECTED  Patient on IV antibiotic as ordered. Afebrile.    Problem: Pain Management  Goal: Pain level will decrease to patient's comfort goal  Outcome: PROGRESSING AS EXPECTED  Medicated with PRN pain medication. Per patient pain medication effective.

## 2019-07-02 NOTE — ED NOTES
Med rec updated and complete.  Allergies reviewed. Pt recently started on bactrim DS on   06/30/19. Pt has taken 2 doses.

## 2019-07-02 NOTE — DIETARY
Nutrition Services - Poor PO intake reported on admission. Malnutrition Screening Tool score <2. No other risk for malnutrition, or other nutrition concerns,  identified on screening. Nutrition assessment not indicated at this time. Pt also with BMI >40 (=Body mass index is 47.49 kg/m².), class III (extreme) obesity. Weight loss counseling not appropriate in acute care setting.     Plan/Recommend:  Referral to outpatient nutrition services for weight management after D/C.     RD will monitor per department guidelines.

## 2019-07-02 NOTE — ASSESSMENT & PLAN NOTE
Better. Still swollen, and lot of discharges,   Neck CT negative for abscess. Panoramic XR negative for dental infection   She did receive bactrim for one day, did not get better    zosyn (07/01--03) and doxy (07/01-07/11)  Culture Staphylococcus aureus.  Sensitivity pending

## 2019-07-02 NOTE — CARE PLAN
Problem: Venous Thromboembolism (VTW)/Deep Vein Thrombosis (DVT) Prevention:  Goal: Patient will participate in Venous Thrombosis (VTE)/Deep Vein Thrombosis (DVT)Prevention Measures  Lovenox subcutaneous ordered for VTE prophylaxis, will administer per MAR order.     Problem: Pain Management  Goal: Pain level will decrease to patient's comfort goal  Paged Dr. Pineda as pt requested ibuprofen for pain instead of tylenol. MD ordered ibuprofen.

## 2019-07-02 NOTE — H&P
Hospital Medicine History & Physical Note    Date of Service  7/1/2019    Primary Care Physician  Jania Bobby M.D.    Consultants  None    Code Status  Full code    Chief Complaint  Neck swelling for 2 days    History of Presenting Illness    33 y.o. female with past medical history of PCOS who presented to the hospital on 7/1/2019 with complaint of neck swelling that has been going on for 2 days.  Patient reported that she noticed swelling first time 2 days ago and she also noticed pain at the site of swelling.  Yesterday she was prescribed Bactrim and she took it and she came back again and it has not been getting better.  She reported her neck pain 8/10 when she left the hospital yesterday and at the time of evaluation her neck pain is 2/10 after receiving pain medications. Her neck pain is radiating all over her neck and bilateral jaw.  Alleviating factors pain medication and aggravating factor when someone tried to squeeze the swelling in order to drain it.  She denies any prior episode of similar problem.  She reported she quit smoking regular cigarettes and currently she is using e-cigarettes.  She denies chest pain, shortness of breath, diarrhea and any other acute complaints.  She reported she has nausea but she does have appetite.    I reviewed her ED note from June 30, 2019.    I discussed this admission with ED physician Dr. Lim    Review of Systems  Review of Systems   Constitutional: Negative for chills, fever and weight loss.   HENT: Negative for hearing loss and tinnitus.         Neck swelling and pain   Eyes: Negative for blurred vision, double vision, photophobia and pain.   Respiratory: Negative for cough, sputum production and shortness of breath.    Cardiovascular: Negative for chest pain, palpitations, orthopnea and leg swelling.   Gastrointestinal: Positive for nausea. Negative for abdominal pain, constipation, diarrhea and vomiting.   Genitourinary: Negative for dysuria, frequency and  urgency.   Musculoskeletal: Negative for back pain, joint pain, myalgias and neck pain.   Skin: Positive for rash.   Neurological: Negative for dizziness, tingling, tremors, sensory change, speech change, focal weakness and headaches.   Psychiatric/Behavioral: Positive for substance abuse (e-cig). Negative for hallucinations.   All other systems reviewed and are negative.      Past Medical History   has a past medical history of Breath shortness (2019); Diverticulosis; PCOS (polycystic ovarian syndrome); Psychiatric problem (2019); Sleep apnea (2019); and Snoring.    Surgical History   has a past surgical history that includes other abdominal surgery; gyn surgery (); other (); and colonoscopy (N/A, 2019).     Family History    I reviewed with patient  Mother  when she was little and mom side of the family has diabetes  She is not sure about her dad side of the family.    Social History   reports that she has been smoking Cigarettes.  She has been smoking about 0.15 packs per day. She has never used smokeless tobacco. She reports that she drinks alcohol. She reports that she uses drugs, including Inhaled.    Allergies  No Known Allergies    Medications  Prior to Admission Medications   Prescriptions Last Dose Informant Patient Reported? Taking?   citalopram (CELEXA) 20 MG Tab 2019 at 0830 Patient Yes No   Sig: Take 10 mg by mouth every day.   levonorgestrel (MIRENA, 52 MG,) 20 MCG/24HR IUD 2019 at continuous Patient Yes No   Si Each by Intrauterine route Once.   spironolactone (ALDACTONE) 50 MG Tab 2019 at 0830 Patient Yes No   Sig: Take 50 mg by mouth every day.   sulfamethoxazole-trimethoprim (BACTRIM DS) 800-160 MG tablet 2019 at 0830 Patient No No   Sig: Take 1 Tab by mouth 2 times a day for 7 days.      Facility-Administered Medications: None       Physical Exam  Temp:  [36.5 °C (97.7 °F)-36.6 °C (97.9 °F)] 36.6 °C (97.9 °F)  Pulse:  [66-99] 66  Resp:   [14-16] 16  BP: (121-131)/(68-96) 121/68  SpO2:  [96 %] 96 %    Physical Exam   Constitutional: She is oriented to person, place, and time. No distress.   Obese   HENT:   Head: Normocephalic and atraumatic.   Eyes: Pupils are equal, round, and reactive to light. Right eye exhibits no discharge. Left eye exhibits no discharge.   Neck: Normal range of motion. Neck supple.   Cardiovascular: Normal rate, regular rhythm and normal heart sounds.  Exam reveals no friction rub.    No murmur heard.  Pulmonary/Chest: Effort normal and breath sounds normal. No respiratory distress. She has no wheezes. She has no rales.   Abdominal: Soft. Bowel sounds are normal. She exhibits no distension. There is no tenderness. There is no rebound.   Musculoskeletal: Normal range of motion. She exhibits edema (Neck) and tenderness (Around neck).   Neurological: She is alert and oriented to person, place, and time. No cranial nerve deficit.   Skin: Skin is warm and dry. No rash noted. She is not diaphoretic. There is erythema (Neck and a small opening with discharge.).   Psychiatric: She has a normal mood and affect. Her behavior is normal.       Laboratory:  Recent Labs      07/01/19   1600   WBC  15.9*   RBC  5.10   HEMOGLOBIN  15.0   HEMATOCRIT  44.4   MCV  87.1   MCH  29.4   MCHC  33.8   RDW  40.6   PLATELETCT  357   MPV  9.5     Recent Labs      07/01/19   1600   SODIUM  136   POTASSIUM  4.1   CHLORIDE  102   CO2  22   GLUCOSE  106*   BUN  16   CREATININE  0.74   CALCIUM  10.0     Recent Labs      07/01/19   1600   GLUCOSE  106*                 No results for input(s): TROPONINI in the last 72 hours.    Urinalysis:    No results found     Imaging:  CT-SOFT TISSUE NECK WITH   Final Result      1.  Cellulitis of the right upper neck with associated skin wound. No drainable subcutaneous abscess.   2.  Reactive level 1 lymphadenopathy.            Assessment/Plan:  I anticipate this patient will require at least two midnights for appropriate  medical management, necessitating inpatient admission.    Cellulitis   Assessment & Plan    She presented with swelling and cellulitis over her neck area.  She was evaluated in ER and she was prescribed Bactrim and did not help her symptoms and she presented again today.  Blood culture was ordered and I ordered wound culture as her wound is slightly trending.  I started on broad-spectrum antibiotic with Zosyn and to cover MRSA I started her on doxycycline.  She underwent CT soft tissue of neck with contrast and it showed cellulitis of the right upper neck with associated skin wound.  No drainable subcutaneous abscess.  I ordered panoramic view of mandible to evaluate for an dental abscess.  Follow cultures and change antibiotic as per culture results.  I discussed plan of care with her and I answered all her questions.     Nicotine dependence   Assessment & Plan    She reported that she has been using e-cigarette most of the day.  I discussed with her regarding nicotine dependence and harmful effect of nicotine on health.  She expressed understanding.  I started her on nicotine replacement therapy.  Time spent: 4 minutes     PCOS (polycystic ovarian syndrome)   Assessment & Plan    She has a history of PCOS.  I started her on outpatient medications.         VTE prophylaxis: Lovenox

## 2019-07-02 NOTE — PROGRESS NOTES
Hospital Medicine Daily Progress Note    Date of Service  7/2/2019    Chief Complaint  33 y.o. female admitted 7/1/2019 with neck wound     Hospital Course    53-year-old female past medical history of PCOS, nicotine user presented with worsening wound and neck swelling on the right side. Please refer to further details of h&P of Dr. Gomes. She did have drainage and packed 07/01/2019. She was sent home on bactrim. It was getting worse so patient presented again. Neck soft tissue CT scan negative for abscess. She was started empiric zosyn and doxy. Panoramic XR negative for tooth infection       Interval Problem Update  Pain is better, less tender, but swelling is about the same    Consultants/Specialty  None     Code Status  Full code     Disposition  Inpatient for IV antibiotic     Review of Systems  Review of Systems   Constitutional: Negative for chills, fever and malaise/fatigue.   HENT: Positive for sore throat.    Respiratory: Negative for cough and shortness of breath.    Cardiovascular: Negative for chest pain.   Gastrointestinal: Negative for abdominal pain, diarrhea, nausea and vomiting.   Skin: Negative for rash.   Neurological: Negative for dizziness.   Psychiatric/Behavioral: Negative for depression.        Physical Exam  Temp:  [36.2 °C (97.2 °F)-37.1 °C (98.7 °F)] 36.2 °C (97.2 °F)  Pulse:  [63-78] 66  Resp:  [16-18] 16  BP: ()/(49-68) 114/60  SpO2:  [96 %] 96 %    Physical Exam   Constitutional: She is oriented to person, place, and time. She appears well-developed and well-nourished. No distress.   HENT:   Head: Normocephalic and atraumatic.   Mouth/Throat: Oropharynx is clear and moist. No oropharyngeal exudate.   Eyes: No scleral icterus.   Neck:   Wound right upper neck, with discharges. Swollen, hard.    Cardiovascular: Normal rate and regular rhythm.    Pulmonary/Chest: Effort normal and breath sounds normal. No respiratory distress.   Abdominal: Soft. She exhibits no distension. There is  no tenderness.   Musculoskeletal: She exhibits no edema.   Lymphadenopathy:     She has cervical adenopathy.   Neurological: She is alert and oriented to person, place, and time. No cranial nerve deficit.   Psychiatric: She has a normal mood and affect.       Fluids    Intake/Output Summary (Last 24 hours) at 07/02/19 1425  Last data filed at 07/02/19 1044   Gross per 24 hour   Intake              300 ml   Output                0 ml   Net              300 ml       Laboratory  Recent Labs      07/01/19   1600  07/02/19   0112   WBC  15.9*  15.7*   RBC  5.10  4.91   HEMOGLOBIN  15.0  14.1   HEMATOCRIT  44.4  43.2   MCV  87.1  88.0   MCH  29.4  28.7   MCHC  33.8  32.6*   RDW  40.6  42.0   PLATELETCT  357  324   MPV  9.5  9.0     Recent Labs      07/01/19   1600  07/02/19   0112   SODIUM  136  136   POTASSIUM  4.1  3.9   CHLORIDE  102  104   CO2  22  24   GLUCOSE  106*  95   BUN  16  18   CREATININE  0.74  0.84   CALCIUM  10.0  9.3                   Imaging  [id number]   Final Result      No periapical lucency identified.      CT-SOFT TISSUE NECK WITH   Final Result      1.  Cellulitis of the right upper neck with associated skin wound. No drainable subcutaneous abscess.   2.  Reactive level 1 lymphadenopathy.           Assessment/Plan  Cellulitis   Assessment & Plan    Swelling on right upper neck, with some drainage. Hard, on palpitation. No erythema around  Neck CT negative for abscess. Panoramic XR negative for dental infection   Less pain today   She did receive bactrim for one day, did not get better   Currently on zosyn and doxy.   Culture pending      Nicotine dependence   Assessment & Plan    Counseling done by admission physician. Pt on nicotine patch      PCOS (polycystic ovarian syndrome)   Assessment & Plan    She has a history of PCOS.  I started her on outpatient medications.          VTE prophylaxis: lovenox

## 2019-07-03 PROBLEM — R19.7 DIARRHEA: Status: ACTIVE | Noted: 2019-07-03

## 2019-07-03 PROBLEM — R07.89 OTHER CHEST PAIN: Status: ACTIVE | Noted: 2019-07-03

## 2019-07-03 LAB
ALBUMIN SERPL BCP-MCNC: 3.9 G/DL (ref 3.2–4.9)
ALBUMIN/GLOB SERPL: 1.2 G/DL
ALP SERPL-CCNC: 47 U/L (ref 30–99)
ALT SERPL-CCNC: 15 U/L (ref 2–50)
ANION GAP SERPL CALC-SCNC: 9 MMOL/L (ref 0–11.9)
AST SERPL-CCNC: 13 U/L (ref 12–45)
BASOPHILS # BLD AUTO: 0.5 % (ref 0–1.8)
BASOPHILS # BLD: 0.06 K/UL (ref 0–0.12)
BILIRUB SERPL-MCNC: 0.6 MG/DL (ref 0.1–1.5)
BUN SERPL-MCNC: 16 MG/DL (ref 8–22)
CALCIUM SERPL-MCNC: 9 MG/DL (ref 8.5–10.5)
CHLORIDE SERPL-SCNC: 106 MMOL/L (ref 96–112)
CO2 SERPL-SCNC: 24 MMOL/L (ref 20–33)
CREAT SERPL-MCNC: 0.75 MG/DL (ref 0.5–1.4)
EOSINOPHIL # BLD AUTO: 0.29 K/UL (ref 0–0.51)
EOSINOPHIL NFR BLD: 2.6 % (ref 0–6.9)
ERYTHROCYTE [DISTWIDTH] IN BLOOD BY AUTOMATED COUNT: 42.1 FL (ref 35.9–50)
GLOBULIN SER CALC-MCNC: 3.2 G/DL (ref 1.9–3.5)
GLUCOSE SERPL-MCNC: 88 MG/DL (ref 65–99)
HCT VFR BLD AUTO: 42.5 % (ref 37–47)
HGB BLD-MCNC: 13.6 G/DL (ref 12–16)
IMM GRANULOCYTES # BLD AUTO: 0.07 K/UL (ref 0–0.11)
IMM GRANULOCYTES NFR BLD AUTO: 0.6 % (ref 0–0.9)
LYMPHOCYTES # BLD AUTO: 3.01 K/UL (ref 1–4.8)
LYMPHOCYTES NFR BLD: 26.7 % (ref 22–41)
MCH RBC QN AUTO: 28.6 PG (ref 27–33)
MCHC RBC AUTO-ENTMCNC: 32 G/DL (ref 33.6–35)
MCV RBC AUTO: 89.3 FL (ref 81.4–97.8)
MONOCYTES # BLD AUTO: 0.84 K/UL (ref 0–0.85)
MONOCYTES NFR BLD AUTO: 7.5 % (ref 0–13.4)
NEUTROPHILS # BLD AUTO: 7 K/UL (ref 2–7.15)
NEUTROPHILS NFR BLD: 62.1 % (ref 44–72)
NRBC # BLD AUTO: 0 K/UL
NRBC BLD-RTO: 0 /100 WBC
PLATELET # BLD AUTO: 330 K/UL (ref 164–446)
PMV BLD AUTO: 9.4 FL (ref 9–12.9)
POTASSIUM SERPL-SCNC: 3.9 MMOL/L (ref 3.6–5.5)
PROT SERPL-MCNC: 7.1 G/DL (ref 6–8.2)
RBC # BLD AUTO: 4.76 M/UL (ref 4.2–5.4)
SODIUM SERPL-SCNC: 139 MMOL/L (ref 135–145)
WBC # BLD AUTO: 11.3 K/UL (ref 4.8–10.8)

## 2019-07-03 PROCEDURE — 770006 HCHG ROOM/CARE - MED/SURG/GYN SEMI*

## 2019-07-03 PROCEDURE — 97597 DBRDMT OPN WND 1ST 20 CM/<: CPT

## 2019-07-03 PROCEDURE — 80053 COMPREHEN METABOLIC PANEL: CPT

## 2019-07-03 PROCEDURE — 700111 HCHG RX REV CODE 636 W/ 250 OVERRIDE (IP): Performed by: INTERNAL MEDICINE

## 2019-07-03 PROCEDURE — 85025 COMPLETE CBC W/AUTO DIFF WBC: CPT

## 2019-07-03 PROCEDURE — A9270 NON-COVERED ITEM OR SERVICE: HCPCS | Performed by: INTERNAL MEDICINE

## 2019-07-03 PROCEDURE — 700102 HCHG RX REV CODE 250 W/ 637 OVERRIDE(OP): Performed by: INTERNAL MEDICINE

## 2019-07-03 PROCEDURE — 99233 SBSQ HOSP IP/OBS HIGH 50: CPT | Performed by: INTERNAL MEDICINE

## 2019-07-03 PROCEDURE — 36415 COLL VENOUS BLD VENIPUNCTURE: CPT

## 2019-07-03 PROCEDURE — 700105 HCHG RX REV CODE 258: Performed by: INTERNAL MEDICINE

## 2019-07-03 RX ORDER — LACTOBACILLUS RHAMNOSUS GG 10B CELL
1 CAPSULE ORAL
Status: DISCONTINUED | OUTPATIENT
Start: 2019-07-03 | End: 2019-07-04 | Stop reason: HOSPADM

## 2019-07-03 RX ADMIN — ONDANSETRON 4 MG: 4 TABLET, ORALLY DISINTEGRATING ORAL at 05:18

## 2019-07-03 RX ADMIN — SODIUM CHLORIDE: 9 INJECTION, SOLUTION INTRAVENOUS at 05:25

## 2019-07-03 RX ADMIN — DOXYCYCLINE 100 MG: 100 TABLET, FILM COATED ORAL at 18:05

## 2019-07-03 RX ADMIN — CITALOPRAM HYDROBROMIDE 10 MG: 20 TABLET ORAL at 08:24

## 2019-07-03 RX ADMIN — DOXYCYCLINE 100 MG: 100 TABLET, FILM COATED ORAL at 08:24

## 2019-07-03 RX ADMIN — IBUPROFEN 800 MG: 800 TABLET ORAL at 18:09

## 2019-07-03 RX ADMIN — Medication 1 CAPSULE: at 13:48

## 2019-07-03 RX ADMIN — ENOXAPARIN SODIUM 40 MG: 100 INJECTION SUBCUTANEOUS at 05:18

## 2019-07-03 RX ADMIN — ONDANSETRON 4 MG: 4 TABLET, ORALLY DISINTEGRATING ORAL at 01:12

## 2019-07-03 RX ADMIN — ONDANSETRON 4 MG: 4 TABLET, ORALLY DISINTEGRATING ORAL at 13:48

## 2019-07-03 RX ADMIN — ONDANSETRON 4 MG: 4 TABLET, ORALLY DISINTEGRATING ORAL at 09:31

## 2019-07-03 RX ADMIN — PIPERACILLIN AND TAZOBACTAM 4.5 G: 4; .5 INJECTION, POWDER, LYOPHILIZED, FOR SOLUTION INTRAVENOUS; PARENTERAL at 05:19

## 2019-07-03 RX ADMIN — ACETAMINOPHEN 650 MG: 325 TABLET, FILM COATED ORAL at 08:24

## 2019-07-03 RX ADMIN — IBUPROFEN 800 MG: 800 TABLET ORAL at 05:18

## 2019-07-03 RX ADMIN — SPIRONOLACTONE 50 MG: 25 TABLET ORAL at 08:24

## 2019-07-03 RX ADMIN — NICOTINE 21 MG: 21 PATCH, EXTENDED RELEASE TRANSDERMAL at 05:18

## 2019-07-03 ASSESSMENT — ENCOUNTER SYMPTOMS
FEVER: 0
CHILLS: 0
NAUSEA: 0
ABDOMINAL PAIN: 0
DIZZINESS: 0
COUGH: 0
DEPRESSION: 0
VOMITING: 0
SORE THROAT: 0
DIARRHEA: 1
SHORTNESS OF BREATH: 0

## 2019-07-03 NOTE — PROGRESS NOTES
Assumed care of pt at 0745. Report received and bedside rounding completed with night RN. Pt is calm no SOB, or in any acute distress noted.    Fall precautions in place, . - Treaded non slip socks. Bed locked. Communication board updated with POC. Call light and pt belongings within reach - pt makes needs known - hourly rounding in place. See flowsheets for further assessment.

## 2019-07-03 NOTE — CARE PLAN
Problem: Communication  Goal: The ability to communicate needs accurately and effectively will improve    Intervention: Educate patient and significant other/support system about the plan of care, procedures, treatments, medications and allow for questions  POC discussed with pt and questions answered. Wound consult in place. Wound nurse notified. Pt made aware. Pt pleasant. White board updated.      Problem: Pain Management  Goal: Pain level will decrease to patient's comfort goal    Intervention: Follow pain managment plan developed in collaboration with patient and Interdisciplinary Team  PRN medication given per MAR with good relief.

## 2019-07-03 NOTE — ASSESSMENT & PLAN NOTE
Yesterday late afternoon patient was complaining of chest pressure associated with diaphoresis and hand sweating.  I did order EKG which shows sinus rhythm with no ST changes.  Troponins negative.  I do believe that chest pain is likely due to acid reflux from antibiotic use.  No other recurrence since then.  Low risk cardiac patient.  No signs of pneumonia or respiratory compromise.  Vitals stable

## 2019-07-03 NOTE — PROGRESS NOTES
Hospital Medicine Daily Progress Note    Date of Service  7/3/2019    Chief Complaint  33 y.o. female admitted 7/1/2019 with neck wound     Hospital Course    53-year-old female past medical history of PCOS, nicotine user presented with worsening wound and neck swelling on the right side. Please refer to further details of h&P of Dr. Gomes. She did have drainage and packed 07/01/2019. She was sent home on bactrim. It was getting worse so patient presented again. Neck soft tissue CT scan negative for abscess. She was started empiric zosyn and doxy. Panoramic XR negative for tooth infection       Interval Problem Update  Pain is better, less tender, but swelling is about the same  07/03-Less swollen, less tender less painful.  Lot of discharges from her wound. Wound care team consulted per RN.  Wound culture positive for staph.  Discontinue Zosyn, continue doxycycline    Consultants/Specialty  Wound care team    Code Status  Full code     Disposition  Inpatient for IV antibiotic     Review of Systems  Review of Systems   Constitutional: Negative for chills, fever and malaise/fatigue.   HENT: Negative for sore throat.    Respiratory: Negative for cough and shortness of breath.    Cardiovascular: Negative for chest pain.   Gastrointestinal: Positive for diarrhea. Negative for abdominal pain, nausea and vomiting.   Skin: Negative for rash.   Neurological: Negative for dizziness.   Psychiatric/Behavioral: Negative for depression.        Physical Exam  Temp:  [36.4 °C (97.5 °F)-37 °C (98.6 °F)] 36.4 °C (97.5 °F)  Pulse:  [56-78] 56  Resp:  [17-18] 17  BP: (101-120)/(49-68) 101/50  SpO2:  [96 %-98 %] 96 %    Physical Exam   Constitutional: She is oriented to person, place, and time. She appears well-developed and well-nourished. No distress.   HENT:   Head: Normocephalic and atraumatic.   Mouth/Throat: Oropharynx is clear and moist. No oropharyngeal exudate.   Neck:   Wound right upper neck, with discharges. Swollen, less  hard but still swollen.    Cardiovascular: Normal rate and regular rhythm.    Pulmonary/Chest: Effort normal and breath sounds normal. No respiratory distress.   Abdominal: Soft. She exhibits no distension. There is no tenderness.   Musculoskeletal: She exhibits no edema.   Lymphadenopathy:     She has cervical adenopathy.   Neurological: She is alert and oriented to person, place, and time. No cranial nerve deficit.   Psychiatric: She has a normal mood and affect.       Fluids    Intake/Output Summary (Last 24 hours) at 07/03/19 1339  Last data filed at 07/03/19 1055   Gross per 24 hour   Intake             1720 ml   Output                0 ml   Net             1720 ml       Laboratory  Recent Labs      07/01/19   1600  07/02/19   0112  07/03/19   0045   WBC  15.9*  15.7*  11.3*   RBC  5.10  4.91  4.76   HEMOGLOBIN  15.0  14.1  13.6   HEMATOCRIT  44.4  43.2  42.5   MCV  87.1  88.0  89.3   MCH  29.4  28.7  28.6   MCHC  33.8  32.6*  32.0*   RDW  40.6  42.0  42.1   PLATELETCT  357  324  330   MPV  9.5  9.0  9.4     Recent Labs      07/01/19   1600  07/02/19   0112  07/03/19   0045   SODIUM  136  136  139   POTASSIUM  4.1  3.9  3.9   CHLORIDE  102  104  106   CO2  22  24  24   GLUCOSE  106*  95  88   BUN  16  18  16   CREATININE  0.74  0.84  0.75   CALCIUM  10.0  9.3  9.0                   Imaging  [id number]   Final Result      No periapical lucency identified.      CT-SOFT TISSUE NECK WITH   Final Result      1.  Cellulitis of the right upper neck with associated skin wound. No drainable subcutaneous abscess.   2.  Reactive level 1 lymphadenopathy.           Assessment/Plan  Cellulitis   Assessment & Plan    Better. Still swollen, and lot of discharges,   Neck CT negative for abscess. Panoramic XR negative for dental infection   She did receive bactrim for one day, did not get better    zosyn (07/01--03) and doxy (07/01-07/11)  Culture Staphylococcus aureus.  Sensitivity pending     Other chest pain    Assessment & Plan    Yesterday late afternoon patient was complaining of chest pressure associated with diaphoresis and hand sweating.  I did order EKG which shows sinus rhythm with no ST changes.  Troponins negative.  I do believe that chest pain is likely due to acid reflux from antibiotic use.  No other recurrence since then.  Low risk cardiac patient.  No signs of pneumonia or respiratory compromise.  Vitals stable     Diarrhea   Assessment & Plan    Patient reports 4-5 loose stools last night.  I assume that is likely due to antibiotic use.  No signs of C-diff at this time.  I will start patient on probiotic. Check c-diff if worse.      Nicotine dependence   Assessment & Plan    Counseling done by admission physician. Pt on nicotine patch      PCOS (polycystic ovarian syndrome)   Assessment & Plan    She has a history of PCOS.  Resume outpatient medications.          VTE prophylaxis: lovenox

## 2019-07-03 NOTE — ASSESSMENT & PLAN NOTE
Patient reports 4-5 loose stools last night.  I assume that is likely due to antibiotic use.  No signs of C-diff at this time.  I will start patient on probiotic. Check c-diff if worse.

## 2019-07-03 NOTE — PROGRESS NOTES
"Assumed patient care at 1900. Received Bedside report. Patient  alert and oriented x 4, reports HA and nausea, medication given per MAR. Discussed POC. No s/s of distress. Patient denies any further questions or concerns. Call light in reach, fall precautions in place. Continue hourly rounding. /49   Pulse 74   Temp 37 °C (98.6 °F) (Temporal)   Resp 18   Ht 1.626 m (5' 4\")   Wt (!) 125.5 kg (276 lb 10.8 oz)   LMP 06/28/2019 (Approximate)   SpO2 96%   BMI 47.49 kg/m²   "

## 2019-07-03 NOTE — CARE PLAN
Problem: Venous Thromboembolism (VTW)/Deep Vein Thrombosis (DVT) Prevention:  Goal: Patient will participate in Venous Thrombosis (VTE)/Deep Vein Thrombosis (DVT)Prevention Measures  Outcome: PROGRESSING AS EXPECTED  Patient receiving lovenox for DVT prophylaxis. No s/s of adverse reactions. Educated on medication and s/s to report.     Problem: Bowel/Gastric:  Goal: Normal bowel function is maintained or improved  Outcome: PROGRESSING AS EXPECTED  Patient reported nausea. Medication provided per Mar. Patient reports relief of nausea.

## 2019-07-04 ENCOUNTER — PATIENT OUTREACH (OUTPATIENT)
Dept: HEALTH INFORMATION MANAGEMENT | Facility: OTHER | Age: 34
End: 2019-07-04

## 2019-07-04 VITALS
SYSTOLIC BLOOD PRESSURE: 117 MMHG | WEIGHT: 276.68 LBS | HEART RATE: 61 BPM | BODY MASS INDEX: 47.24 KG/M2 | HEIGHT: 64 IN | RESPIRATION RATE: 16 BRPM | TEMPERATURE: 97.1 F | OXYGEN SATURATION: 95 % | DIASTOLIC BLOOD PRESSURE: 62 MMHG

## 2019-07-04 PROBLEM — R19.7 DIARRHEA: Status: RESOLVED | Noted: 2019-07-03 | Resolved: 2019-07-04

## 2019-07-04 PROBLEM — R07.89 OTHER CHEST PAIN: Status: RESOLVED | Noted: 2019-07-03 | Resolved: 2019-07-04

## 2019-07-04 LAB
ALBUMIN SERPL BCP-MCNC: 4 G/DL (ref 3.2–4.9)
ALBUMIN/GLOB SERPL: 1.3 G/DL
ALP SERPL-CCNC: 47 U/L (ref 30–99)
ALT SERPL-CCNC: 14 U/L (ref 2–50)
ANION GAP SERPL CALC-SCNC: 8 MMOL/L (ref 0–11.9)
AST SERPL-CCNC: 13 U/L (ref 12–45)
BACTERIA WND AEROBE CULT: ABNORMAL
BACTERIA WND AEROBE CULT: ABNORMAL
BASOPHILS # BLD AUTO: 0.7 % (ref 0–1.8)
BASOPHILS # BLD: 0.07 K/UL (ref 0–0.12)
BILIRUB SERPL-MCNC: 0.5 MG/DL (ref 0.1–1.5)
BUN SERPL-MCNC: 11 MG/DL (ref 8–22)
CALCIUM SERPL-MCNC: 9.4 MG/DL (ref 8.5–10.5)
CHLORIDE SERPL-SCNC: 107 MMOL/L (ref 96–112)
CO2 SERPL-SCNC: 26 MMOL/L (ref 20–33)
CREAT SERPL-MCNC: 0.67 MG/DL (ref 0.5–1.4)
EOSINOPHIL # BLD AUTO: 0.25 K/UL (ref 0–0.51)
EOSINOPHIL NFR BLD: 2.5 % (ref 0–6.9)
ERYTHROCYTE [DISTWIDTH] IN BLOOD BY AUTOMATED COUNT: 42.5 FL (ref 35.9–50)
GLOBULIN SER CALC-MCNC: 3.2 G/DL (ref 1.9–3.5)
GLUCOSE SERPL-MCNC: 90 MG/DL (ref 65–99)
GRAM STN SPEC: ABNORMAL
HCT VFR BLD AUTO: 44.6 % (ref 37–47)
HGB BLD-MCNC: 14.3 G/DL (ref 12–16)
IMM GRANULOCYTES # BLD AUTO: 0.05 K/UL (ref 0–0.11)
IMM GRANULOCYTES NFR BLD AUTO: 0.5 % (ref 0–0.9)
LYMPHOCYTES # BLD AUTO: 3.19 K/UL (ref 1–4.8)
LYMPHOCYTES NFR BLD: 31.8 % (ref 22–41)
MCH RBC QN AUTO: 28.8 PG (ref 27–33)
MCHC RBC AUTO-ENTMCNC: 32.1 G/DL (ref 33.6–35)
MCV RBC AUTO: 89.7 FL (ref 81.4–97.8)
MONOCYTES # BLD AUTO: 0.75 K/UL (ref 0–0.85)
MONOCYTES NFR BLD AUTO: 7.5 % (ref 0–13.4)
NEUTROPHILS # BLD AUTO: 5.71 K/UL (ref 2–7.15)
NEUTROPHILS NFR BLD: 57 % (ref 44–72)
NRBC # BLD AUTO: 0 K/UL
NRBC BLD-RTO: 0 /100 WBC
PLATELET # BLD AUTO: 358 K/UL (ref 164–446)
PMV BLD AUTO: 9.3 FL (ref 9–12.9)
POTASSIUM SERPL-SCNC: 4.4 MMOL/L (ref 3.6–5.5)
PROT SERPL-MCNC: 7.2 G/DL (ref 6–8.2)
RBC # BLD AUTO: 4.97 M/UL (ref 4.2–5.4)
SIGNIFICANT IND 70042: ABNORMAL
SITE SITE: ABNORMAL
SODIUM SERPL-SCNC: 141 MMOL/L (ref 135–145)
SOURCE SOURCE: ABNORMAL
WBC # BLD AUTO: 10 K/UL (ref 4.8–10.8)

## 2019-07-04 PROCEDURE — 80053 COMPREHEN METABOLIC PANEL: CPT

## 2019-07-04 PROCEDURE — 99239 HOSP IP/OBS DSCHRG MGMT >30: CPT | Performed by: INTERNAL MEDICINE

## 2019-07-04 PROCEDURE — 302053 SPONGE GAUZE N/S 4X4 200/PK: Performed by: INTERNAL MEDICINE

## 2019-07-04 PROCEDURE — 36415 COLL VENOUS BLD VENIPUNCTURE: CPT

## 2019-07-04 PROCEDURE — A9270 NON-COVERED ITEM OR SERVICE: HCPCS | Performed by: INTERNAL MEDICINE

## 2019-07-04 PROCEDURE — 85025 COMPLETE CBC W/AUTO DIFF WBC: CPT

## 2019-07-04 PROCEDURE — 700102 HCHG RX REV CODE 250 W/ 637 OVERRIDE(OP): Performed by: INTERNAL MEDICINE

## 2019-07-04 PROCEDURE — 700111 HCHG RX REV CODE 636 W/ 250 OVERRIDE (IP): Performed by: INTERNAL MEDICINE

## 2019-07-04 RX ORDER — DOXYCYCLINE 100 MG/1
100 TABLET ORAL EVERY 12 HOURS
Qty: 20 TAB | Refills: 0 | Status: SHIPPED | OUTPATIENT
Start: 2019-07-04 | End: 2019-07-14

## 2019-07-04 RX ADMIN — ENOXAPARIN SODIUM 40 MG: 100 INJECTION SUBCUTANEOUS at 05:48

## 2019-07-04 RX ADMIN — ONDANSETRON 4 MG: 4 TABLET, ORALLY DISINTEGRATING ORAL at 03:36

## 2019-07-04 RX ADMIN — DOXYCYCLINE 100 MG: 100 TABLET, FILM COATED ORAL at 08:54

## 2019-07-04 RX ADMIN — ONDANSETRON 4 MG: 4 TABLET, ORALLY DISINTEGRATING ORAL at 12:04

## 2019-07-04 RX ADMIN — CITALOPRAM HYDROBROMIDE 10 MG: 20 TABLET ORAL at 08:55

## 2019-07-04 RX ADMIN — NICOTINE 21 MG: 21 PATCH, EXTENDED RELEASE TRANSDERMAL at 05:48

## 2019-07-04 RX ADMIN — IBUPROFEN 800 MG: 800 TABLET ORAL at 08:54

## 2019-07-04 RX ADMIN — SPIRONOLACTONE 50 MG: 25 TABLET ORAL at 08:54

## 2019-07-04 RX ADMIN — Medication 1 CAPSULE: at 08:54

## 2019-07-04 NOTE — CARE PLAN
Problem: Communication  Goal: The ability to communicate needs accurately and effectively will improve    Intervention: Educate patient and significant other/support system about the plan of care, procedures, treatments, medications and allow for questions  Answering pt's questions regarding POC and discharge. Collaborating with LAURIE and MD regarding out patient wound clinic services.       Problem: Infection  Goal: Will remain free from infection  Pt on oral abx. Wound dressing changes. Monitoring lab values.

## 2019-07-04 NOTE — WOUND TEAM
"[facility]  Inpatient Services  Initial Wound and Skin Care Evaluation    Admission Date:  7/1/2019   HPI, PMH, SH: Reviewed  Unit where seen by Wound Team: [id number]/[id number]    WOUND CONSULT RELATED TO: R neck    SUBJECTIVE: \"I have to pluck the hairs every day or I get infections.\"    Self Report / Pain Level: pain after drsg in place\" It starting to hurt.\"  RN notified      OBJECTIVE: drsg in place saturated  WOUND TYPE, LOCATION, CHARACTERISTICS (Pressure ulcers: location, stage, POA or date identified)    Wound 07/01/19 Full Thickness Wound Neck (Active)     7/3/2019  9:00 PM   Site Assessment Red;Yellow    Autumn-wound Assessment Edema;Induration    Margins Defined edges    Wound Length (cm) 1 cm 7/3/2019  9:00 PM   Wound Width (cm) 1.2 cm 7/3/2019  9:00 PM   Wound Depth (cm) 1.7 cm 7/3/2019  9:00 PM   Wound Surface Area (cm^2) 1.2 cm^2 7/3/2019  9:00 PM   Tunneling 0 cm 7/3/2019  9:00 PM   Undermining 0 cm 7/3/2019  9:00 PM   Drainage Amount Moderate    Drainage Description Foul purulent    Non-staged Wound Description Full thickness    Treatments Cleansed    Cleansing Normal Saline Irrigation    Periwound Protectant Not Applicable    Dressing Options Strip Iodoform;Nonadhesive Foam;Hypafix Tape    Dressing Cleansing/Solutions Not Applicable    Dressing Changed Changed    Dressing Status Intact    Dressing Change Frequency Every Shift    NEXT Dressing Change  07/03/19    NEXT Weekly Photo (Inpatient Only) 07/10/19     Odor Mild     Exposed Structures None     Tissue Type and Percentage 70% red, 30% yellow      Vascular:  Wounds not r/t vascular problem    Lab Values:    WBC:       WBC   Date/Time Value Ref Range Status   07/03/2019 12:45 AM 11.3 (H) 4.8 - 10.8 K/uL Final     AIC:    No results found for: HBA1C       Culture:   Already completed    INTERVENTIONS BY WOUND TEAM: irrigated wound with multiple NS flushes. Using forceps and scissors removed yellow tissue from edges of wound revealing some red " "tissue. Cleaned wound again. Loosely filled wound bed with 1/4\" wide iodoform packin, covered with piece of non-adhesive foam and secured with hypafix tape.   Dressing Options: Strip Iodoform, Nonadhesive Foam, Hypafix Tape    Interdisciplinary consultation:   With Nursing;With Patient    EVALUATION: pt has full thickness wound to R side of neck/jowl    Factors affecting wound healing: infection, nicotine dependence  Goals:  Steady decrease in wound area and depth weekly       NURSING PLAN OF CARE ORDERS (X):    Dressing changes: See Dressing Care orders:   x  Skin care: See Skin Care orders:   Rectal tube care: See Rectal Tube Care orders:   Other orders:    RSKIN: CURRENT (X) ORDERED (O)  Pt performs self care  Q shift Adal:  X  Q shift pressure point assessments:  X  Pressure redistribution mattress   x         JOHANN          Bariatric JOHANN        Bariatric foam           Heel float boots         Float Heels off Bed with Pillows              Barrier wipes         Barrier Cream         Barrier paste        Sacral silicone dressing         Silicone O2 tubing         Anchorfast         Cannula fixation Device (Tender )          Gray Foam Ear protectors           Trach with split foam               Waffle cushion        Waffle Overlay         Rectal tube or BMS         Antifungal tx   Interdry         Reposition q 2 hours   x pt performs    Up to chair        Ambulate      PT/OT        Dietician        Diabetes Education      PO x   TF     TPN     NPO   # days   Other     WOUND TEAM PLAN OF CARE (X):   NPWT change 3 x week:        Dressing changes by wound team:       Follow up as needed:  x     Other (explain):    Anticipated discharge plans (X):  SNF:           Home Care:           Outpatient Wound Center:            Self Care:            Other:   Needs drsg changes        "

## 2019-07-04 NOTE — DISCHARGE INSTRUCTIONS
Discharge Instructions    Discharged to home by car with relative. Discharged via wheelchair, hospital escort: Yes.  Special equipment needed: Not Applicable    Be sure to schedule a follow-up appointment with your primary care doctor or any specialists as instructed.     Discharge Plan:   Diet Plan: Discussed  Activity Level: Discussed  Smoking Cessation Offered: Patient Refused  Confirmed Follow up Appointment: Patient to Call and Schedule Appointment  Confirmed Symptoms Management: Discussed  Medication Reconciliation Updated: Yes  Influenza Vaccine Indication: Indicated: Not available from distributor/    I understand that a diet low in cholesterol, fat, and sodium is recommended for good health. Unless I have been given specific instructions below for another diet, I accept this instruction as my diet prescription.   Other diet: Regular as tolerated.    Special Instructions: None    · Is patient discharged on Warfarin / Coumadin?   No     Depression / Suicide Risk    As you are discharged from this [facility] facility, it is important to learn how to keep safe from harming yourself.    Recognize the warning signs:  · Abrupt changes in personality, positive or negative- including increase in energy   · Giving away possessions  · Change in eating patterns- significant weight changes-  positive or negative  · Change in sleeping patterns- unable to sleep or sleeping all the time   · Unwillingness or inability to communicate  · Depression  · Unusual sadness, discouragement and loneliness  · Talk of wanting to die  · Neglect of personal appearance   · Rebelliousness- reckless behavior  · Withdrawal from people/activities they love  · Confusion- inability to concentrate     If you or a loved one observes any of these behaviors or has concerns about self-harm, here's what you can do:  · Talk about it- your feelings and reasons for harming yourself  · Remove any means that you might use to hurt yourself  (examples: pills, rope, extension cords, firearm)  · Get professional help from the community (Mental Health, Substance Abuse, psychological counseling)  · Do not be alone:Call your Safe Contact- someone whom you trust who will be there for you.  · Call your local CRISIS HOTLINE 544-4724 or 074-816-2614  · Call your local Children's Mobile Crisis Response Team [place] (714) 978-3560 or www.[employer]  · Call the toll free National Suicide Prevention Hotlines   · National Suicide Prevention Lifeline 380-466-EEBC (5191)  · National Hope Line Network 800-SUICIDE (867-8186)        Ingrown Hair  An ingrown hair is a hair that curls and re-enters the skin instead of growing straight out of the skin. It happens most often with curly hair. It is usually more severe in the neck area, but it can occur in any shaved area, including the beard area, groin, scalp, and legs. An ingrown hair may cause small pockets of infection.  CAUSES   Shaving closely, tweezing, or waxing, especially curly hair. Using hair removal creams can sometimes lead to ingrown hairs, especially in the groin.  SYMPTOMS   · Small bumps on the skin. The bumps may be filled with pus.  · Pain.  · Itching.  DIAGNOSIS   Your caregiver can usually tell what is wrong by doing a physical exam.  TREATMENT   If there is a severe infection, your caregiver may prescribe antibiotic medicines. Laser hair removal may also be done to help prevent regrowth of the hair.  HOME CARE INSTRUCTIONS   · Do not shave irritated skin. You may start shaving again once the irritation has gone away.  · If you are prone to ingrown hairs, consider not shaving as much as possible.  · If antibiotics are prescribed, take them as directed. Finish them even if you start to feel better.  · You may use a facial sponge in a gentle circular motion to help dislodge ingrown hairs on the face.  · You may use a hair removal cream weekly, especially on the legs and underarms. Stop using the  "cream if it irritates your skin. Use caution when using hair removal creams in the groin area.  SHAVING INSTRUCTIONS AFTER TREATMENT  · Shower before shaving. Keep areas to be shaved packed in warm, moist wraps for several minutes before shaving. The warm, moist environment helps soften the hairs and makes ingrown hairs less likely to occur.  · Use thick shaving gels.  · Use a bump fighter razor that cuts hair slightly above the skin level or use an electric shaver with a longer shave setting.  · Shave in the direction of hair growth. Avoid making multiple razor strokes.  · Use moisturizing lotions after shaving.  This information is not intended to replace advice given to you by your health care provider. Make sure you discuss any questions you have with your health care provider.  Document Released: 03/26/2002 Document Revised: 06/18/2013 Document Reviewed: 10/07/2016  [employer] Interactive Patient Education © 2017 [employer]        Wound Care Instructions:    Supplies: NS irrigation                   1/4 wide iodoform strip                   Gauze                   hypafix tape    irrigated wound with flush. Loosely fill wound bed with 1/4\" wide iodoform packin, cover with 4x4 gauze and secured with hypafix tape.   Dressing to be changed every twelve hours. Twice a day. Please follow up with out patient wound clinic per Dr. Leung's referral. Address wound with primary care provider prior to wound clinic appointment for further follow up.           "

## 2019-07-04 NOTE — DISCHARGE SUMMARY
Discharge Summary    CHIEF COMPLAINT ON ADMISSION  Chief Complaint   Patient presents with   • Wound Check     was packed yesterday, swelling and pain has increased        Reason for Admission  check packing, follow up     Admission Date  7/1/2019    CODE STATUS  Full Code    HPI & HOSPITAL COURSE      53-year-old female past medical history of PCOS, nicotine user presented with worsening wound and neck swelling on the right side. Please refer to further details of h&P of Dr. Gomes. She did have drainage and packed 07/01/2019. She was sent home on bactrim. It was getting worse so patient presented again to ER. Neck soft tissue CT scan negative for abscess. She was started empiric zosyn and doxy. Panoramic XR negative for tooth infection. Wound culture came back positive for staphylococcus aurous. We did continue doxycycline 100 mg BID and she will be sent home with 10 more days treatment. Wound care team consulted. Pt will follow with wound care as outpatient but she will do dressing and packing on her own until she is able to arrange apt to see her     Therefore, she is discharged in good and stable condition to home with close outpatient follow-up.    The patient met 2-midnight criteria for an inpatient stay at the time of discharge.    Discharge Date  07/04/2019     FOLLOW UP ITEMS POST DISCHARGE  Wound culture sensitivity     DISCHARGE DIAGNOSES  Active Problems:    Cellulitis POA: Unknown    PCOS (polycystic ovarian syndrome) POA: Unknown    Nicotine dependence POA: Unknown  Resolved Problems:    Diarrhea POA: Unknown    Other chest pain POA: Unknown      FOLLOW UP  No future appointments.  Jania Bobby M.D.  [address]  [address]  [facility] [address]  [phone]    Schedule an appointment as soon as possible for a visit in 1 week  PCP OFFICE CLOSED FOR THE HOLIDAY. PLEASE CALL OFFICE TO ARRANGE YOUR FOLLOW UP APPOINTMENT. THANK YOU    [facility]  [address]  [facility]  [address]  [phone]  Call on 7/5/2019  Please call Wound Care Center to make appointment.      MEDICATIONS ON DISCHARGE     Medication List      START taking these medications      Instructions   doxycycline monohydrate 100 MG tablet  Commonly known as:  ADOXA   Take 1 Tab by mouth every 12 hours for 10 days.  Dose:  100 mg        CONTINUE taking these medications      Instructions   citalopram 20 MG Tabs  Commonly known as:  CELEXA   Take 10 mg by mouth every day.  Dose:  10 mg     MIRENA (52 MG) 20 MCG/24HR IUD  Generic drug:  levonorgestrel   1 Each by Intrauterine route Once.  Dose:  1 Each     spironolactone 50 MG Tabs  Commonly known as:  ALDACTONE   Take 50 mg by mouth every day.  Dose:  50 mg        STOP taking these medications    sulfamethoxazole-trimethoprim 800-160 MG tablet  Commonly known as:  BACTRIM DS            Allergies  No Known Allergies    DIET  Orders Placed This Encounter   Procedures   • Diet Order Regular     Standing Status:   Standing     Number of Occurrences:   1     Order Specific Question:   Diet:     Answer:   Regular [1]       ACTIVITY  As tolerated.  Weight bearing as tolerated    CONSULTATIONS  None     PROCEDURES  None     LABORATORY  Lab Results   Component Value Date    SODIUM 141 07/04/2019    POTASSIUM 4.4 07/04/2019    CHLORIDE 107 07/04/2019    CO2 26 07/04/2019    GLUCOSE 90 07/04/2019    BUN 11 07/04/2019    CREATININE 0.67 07/04/2019    CREATININE 0.7 11/27/2007        Lab Results   Component Value Date    WBC 10.0 07/04/2019    HEMOGLOBIN 14.3 07/04/2019    HEMATOCRIT 44.6 07/04/2019    PLATELETCT 358 07/04/2019        Total time of the discharge process exceeds 30 minutes.

## 2019-07-04 NOTE — PROGRESS NOTES
Assumed care of pt at 0745. Report received and bedside rounding completed with night RN. Pt is calm no SOB, or in any acute distress noted.    Wound changed per NOC RN this am around 6am. Pt sleeping at this time.     Fall precautions in place, . - Treaded non slip socks. Bed locked. Communication board updated with POC. Call light and pt belongings within reach - pt makes needs known - hourly rounding in place. See flowsheets for further assessment.

## 2019-07-04 NOTE — PROGRESS NOTES
"Assumed patient care at 1900. Received Bedside report. Patient  alert and oriented x 4 sitting up in bed. Patient with dressing to neck intact and clean. Discussed POC. No s/s of distress. Patient denies any further questions or concerns. Call light in reach, fall precautions in place. Continue hourly rounding. /69   Pulse 71   Temp 36.4 °C (97.5 °F) (Temporal)   Resp 16   Ht 1.626 m (5' 4\")   Wt (!) 125.5 kg (276 lb 10.8 oz)   LMP 06/28/2019 (Approximate)   SpO2 96%   BMI 47.49 kg/m²   "

## 2019-07-04 NOTE — DOCUMENTATION QUERY
[facility]                                                                       Query Response Note      PATIENT:               BINU MONTOYA  ACCT #:                  [id number]  MRN:                     [id number]  :                      1985  ADMIT DATE:       2019 3:33 PM  DISCH DATE:          RESPONDING  PROVIDER #:        [id number]           QUERY TEXT:    Cellulitis has developed in the post-procedure period.  Can the relationship be further specified.     NOTE:  If an appropriate response is not listed below, please respond with a new note.      The patient's Clinical Indicators include:  ER MD Note documents: abscess (rt side of chin) which was drained and packed yesterday; swelling has progressively worsened. She did not have any swelling to the left side of her neck at the time that the abscess was drainage. Since then she has noticed that the left side of her face and neck have become more swollen  H&P MD documents: CT of neck with contrast showed cellulitis of the right upper neck with associated skin wound.  No drainable subcutaneous abscess.   MD documents: drainage and packed 2019  Admit Lab results: WBC 15.9, wound cx +Staphylococcus aureus   Treatment: Wound Cx,, Zosyn, Doxycycline, CT  Risk Factors: Recent drainage of abscess, Morbid obesity, Nicotine dependence, PCOS    Query created by: Viv Tuttle on 7/3/2019 12:50 PM    RESPONSE TEXT:    Cellulitis is not due to a complication of the procedure          Electronically signed by:  WASHINGTON FERNANDEZ MD 7/3/2019 5:34 PM

## 2019-10-05 ENCOUNTER — NON-PROVIDER VISIT (OUTPATIENT)
Dept: OCCUPATIONAL MEDICINE | Facility: CLINIC | Age: 34
End: 2019-10-05
Payer: COMMERCIAL

## 2019-10-05 VITALS
WEIGHT: 280 LBS | OXYGEN SATURATION: 98 % | DIASTOLIC BLOOD PRESSURE: 84 MMHG | TEMPERATURE: 97.2 F | BODY MASS INDEX: 49.61 KG/M2 | SYSTOLIC BLOOD PRESSURE: 148 MMHG | HEIGHT: 63 IN | HEART RATE: 92 BPM | RESPIRATION RATE: 18 BRPM

## 2019-10-05 DIAGNOSIS — Z02.83 ENCOUNTER FOR DRUG SCREENING: ICD-10-CM

## 2019-10-05 PROCEDURE — 99026 IN-HOSPITAL ON CALL SERVICE: CPT | Performed by: PREVENTIVE MEDICINE

## 2019-10-05 PROCEDURE — 82075 ASSAY OF BREATH ETHANOL: CPT | Performed by: PREVENTIVE MEDICINE

## 2019-10-05 PROCEDURE — 99284 EMERGENCY DEPT VISIT MOD MDM: CPT

## 2019-10-05 PROCEDURE — 80305 DRUG TEST PRSMV DIR OPT OBS: CPT | Performed by: PREVENTIVE MEDICINE

## 2019-10-06 ENCOUNTER — HOSPITAL ENCOUNTER (EMERGENCY)
Facility: MEDICAL CENTER | Age: 34
End: 2019-10-06
Attending: EMERGENCY MEDICINE
Payer: COMMERCIAL

## 2019-10-06 ENCOUNTER — APPOINTMENT (OUTPATIENT)
Dept: RADIOLOGY | Facility: MEDICAL CENTER | Age: 34
End: 2019-10-06
Attending: EMERGENCY MEDICINE
Payer: COMMERCIAL

## 2019-10-06 DIAGNOSIS — S60.221A CONTUSION OF RIGHT HAND, INITIAL ENCOUNTER: ICD-10-CM

## 2019-10-06 LAB
AMP AMPHETAMINE: NORMAL
COC COCAINE: NORMAL
INT CON NEG: NORMAL
INT CON POS: NORMAL
MET METHAMPHETAMINES: NORMAL
OPI OPIATES: NORMAL
PCP PHENCYCLIDINE: NORMAL
POC DRUG COMMENT 753798-OCCUPATIONAL HEALTH: NORMAL
THC: NORMAL

## 2019-10-06 PROCEDURE — 73130 X-RAY EXAM OF HAND: CPT | Mod: RT

## 2019-10-06 PROCEDURE — 700102 HCHG RX REV CODE 250 W/ 637 OVERRIDE(OP): Performed by: EMERGENCY MEDICINE

## 2019-10-06 PROCEDURE — 73200 CT UPPER EXTREMITY W/O DYE: CPT | Mod: RT

## 2019-10-06 PROCEDURE — A9270 NON-COVERED ITEM OR SERVICE: HCPCS | Performed by: EMERGENCY MEDICINE

## 2019-10-06 RX ORDER — IBUPROFEN 600 MG/1
600 TABLET ORAL ONCE
Status: COMPLETED | OUTPATIENT
Start: 2019-10-06 | End: 2019-10-06

## 2019-10-06 RX ORDER — IBUPROFEN 800 MG/1
800 TABLET ORAL EVERY 8 HOURS PRN
Qty: 30 TAB | Refills: 0 | Status: SHIPPED
Start: 2019-10-06 | End: 2020-11-30

## 2019-10-06 RX ADMIN — IBUPROFEN 600 MG: 600 TABLET ORAL at 01:49

## 2019-10-06 NOTE — LETTER
"  FORM C-4:  EMPLOYEE’S CLAIM FOR COMPENSATION/ REPORT OF INITIAL TREATMENT  EMPLOYEE’S CLAIM - PROVIDE ALL INFORMATION REQUESTED   First Name  Mariza Last Name  Julian Birthdate             Age  1985 34 y.o. 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 (if applicable)  trying to change a resident    How did this injury or occupational disease occur? Be specific and answer in detail. Use additional sheet if necessary)  we were trying to get her standing from her recliner to change her and she kept refusing trying to sit back down grabing out hands, squeezing them   If you believe that you have an occupational disease, when did you first have knowledge of the disability and it relationship to your employment?  n/a Witnesses to the Accident  co worker     Nature of Injury or Occupational Disease  Workers' Compensation  Part(s) of Body Injured or Affected  Hand (L), Hand (R), N/A    I certify that the above is true and correct to the best of my " knowledge and that I have provided this information in order to obtain the benefits of [place] inclusive or Chapter 617 of [place]  I hereby authorize any physician, chiropractor, surgeon, practitioner, or other person, any hospital, including [facility] or [facility], any medical service organization, any insurance company, or other institution or organization to release to each other, any medical or other information, including benefits paid or payable, pertinent to this injury or disease, except information relative to diagnosis, treatment and/or counseling for AIDS, psychological conditions, alcohol or controlled substances, for which I must give specific authorization.  A Photostat of this authorization shall be as valid as the original.   Date Place   Employee’s Signature   THIS REPORT MUST BE COMPLETED AND MAILED WITHIN 3 WORKING DAYS OF TREATMENT   Place  [facility], EMERGENCY DEPT  Name of Facility   [facility]   Date  10/5/2019 Diagnosis  No diagnosis found. Is there evidence the injured employee was under the influence of alcohol and/or another controlled substance at the time of accident?   Hour  3:32 AM Description of Injury or Disease       Treatment     Have you advised the patient to remain off work five days or more?             X-Ray Findings      If Yes   From Date    To Date      From information given by the employee, together with medical evidence, can you directly connect this injury or occupational disease as job incurred?    If No, is the employee capable of: Full Duty    Modified Duty      Is additional medical care by a physician indicated?    If Modified Duty, Specify any Limitations / Restrictions        Do you know of any previous injury or disease contributing to this condition or occupational disease?      Date  10/6/2019 Print Doctor’s Name  Jamison  "Kristian FORD certify the employer’s copy of this form was mailed on:   Address  [address]  [place] [address] [phone] Insurer’s Use Only   [facility]  [address]    Provider’s Tax ID Number  [id number] Telephone  Dept: [phone]    Doctor’s Signature    Degree       Original - TREATING PHYSICIAN OR CHIROPRACTOR   Pg 2-Insurer/TPA   Pg 3-Employer   Pg 4-Employee                                                                                                  Form C-4 (rev01/03)     BRIEF DESCRIPTION OF RIGHTS AND BENEFITS  (Pursuant to NRS 616C.050)    Notice of Injury or Occupational Disease (Incident Report Form C-1): If an injury or occupational disease (OD) arises out of and in the course of employment, you must provide written notice to your employer as soon as practicable, but no later than 7 days after the accident or OD. Your employer shall maintain a sufficient supply of the required forms.    Claim for Compensation (Form C-4): If medical treatment is sought, the form C-4 is available at the place of initial treatment. A completed \"Claim for Compensation\" (Form C-4) must be filed within 90 days after an accident or OD. The treating physician or chiropractor must, within 3 working days after treatment, complete and mail to the employer, the employer's insurer and third-party , the Claim for Compensation.    Medical Treatment: If you require medical treatment for your on-the-job injury or OD, you may be required to select a physician or chiropractor from a list provided by your workers’ compensation insurer, if it has contracted with an Organization for Managed Care (MCO) or Preferred Provider Organization (PPO) or providers of health care. If your employer has not entered into a contract with an MCO or PPO, you may select a physician or chiropractor from the Panel of Physicians and Chiropractors. Any medical costs related to your industrial injury or OD will be paid " by your insurer.    Temporary Total Disability (TTD): If your doctor has certified that you are unable to work for a period of at least 5 consecutive days, or 5 cumulative days in a 20-day period, or places restrictions on you that your employer does not accommodate, you may be entitled to TTD compensation.    Temporary Partial Disability (TPD): If the wage you receive upon reemployment is less than the compensation for TTD to which you are entitled, the insurer may be required to pay you TPD compensation to make up the difference. TPD can only be paid for a maximum of 24 months.    Permanent Partial Disability (PPD): When your medical condition is stable and there is an indication of a PPD as a result of your injury or OD, within 30 days, your insurer must arrange for an evaluation by a rating physician or chiropractor to determine the degree of your PPD. The amount of your PPD award depends on the date of injury, the results of the PPD evaluation and your age and wage.    Permanent Total Disability (PTD): If you are medically certified by a treating physician or chiropractor as permanently and totally disabled and have been granted a PTD status by your insurer, you are entitled to receive monthly benefits not to exceed 66 2/3% of your average monthly wage. The amount of your PTD payments is subject to reduction if you previously received a PPD award.    Vocational Rehabilitation Services: You may be eligible for vocational rehabilitation services if you are unable to return to the job due to a permanent physical impairment or permanent restrictions as a result of your injury or occupational disease.    Transportation and Per John Reimbursement: You may be eligible for travel expenses and per john associated with medical treatment.  Reopening: You may be able to reopen your claim if your condition worsens after claim closure.    Appeal Process: If you disagree with a written determination issued by the insurer or  the insurer does not respond to your request, you may appeal to the Department of Administration, , by following the instructions contained in your determination letter. You must appeal the determination within 70 days from the date of the determination letter at [address], [address], [place], [place] [address], or [address], [address], [place], [place] [address]. If you disagree with the  decision, you may appeal to the Department of Administration, . You must file your appeal within 30 days from the date of the  decision letter at [address], [address], [place], [place] [address], or [address], [address], [place], [place] [address]. If you disagree with a decision of an , you may file a petition for judicial review with the District Court. You must do so within 30 days of the Appeal Officer’s decision. You may be represented by an  at your own expense or you may contact the [facility] for possible representation.    [place]  for Injured Workers (NAIW): If you disagree with a  decision, you may request that NAIW represent you without charge at an  Hearing. For information regarding denial of benefits, you may contact the [facility] at: [address], [address], [place] [address], [phone], or [address], [address], [place] [address], [phone]    To File a Complaint with the Division: If you wish to file a complaint with the  of the Division of Industrial Relations (DIR), please contact the Workers’ Compensation Section, [address], [address], [place], [place] [address], telephone [phone], or [address], [place], [place] [address], telephone [phone].    For assistance with Workers’ Compensation Issues: you may contact the Office of the Governor Consumer Health Assistance,  [address], [place], [place] [address], Toll Free 1-386.491.4559, Web site: http://[facility]und.[facility].[email or web address]., E-mail [email or web address].                                                                                                                                                                               __________________________________________________________________                                    _________________            Employee Name / Signature                                                                                                                            Date                                       D-2 (rev. 10/07)

## 2019-10-06 NOTE — ED TRIAGE NOTES
Hurt hand last night at work resident twisted or smashed her hand.  Pt claims hand is swollen. Cms intact, has full range of motion.

## 2019-10-06 NOTE — LETTER
"  FORM C-4:  EMPLOYEE’S CLAIM FOR COMPENSATION/ REPORT OF INITIAL TREATMENT  EMPLOYEE’S CLAIM - PROVIDE ALL INFORMATION REQUESTED   First Name  Mariza Last Name  Julian Birthdate             Age  1985 34 y.o. 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 (if applicable)  trying to change a resident    How did this injury or occupational disease occur? Be specific and answer in detail. Use additional sheet if necessary)  we were trying to get her standing from her recliner to change her and she kept refusing trying to sit back down grabing out hands, squeezing them   If you believe that you have an occupational disease, when did you first have knowledge of the disability and it relationship to your employment?  n/a Witnesses to the Accident  co worker     Nature of Injury or Occupational Disease  Workers' Compensation  Part(s) of Body Injured or Affected  Hand (L), Hand (R), N/A    I certify that the above is true and correct to the best of my " knowledge and that I have provided this information in order to obtain the benefits of [place] 616A [place] 616[place], inclusive or Chapter 617 of [place]RS).  I hereby authorize any physician, chiropractor, surgeon, practitioner, or other person, any hospital, including [facility], any medical service organization, any insurance company, or other institution or organization to release to each other, any medical or other information, including benefits paid or payable, pertinent to this injury or disease, except information relative to diagnosis, treatment and/or counseling for AIDS, psychological conditions, alcohol or controlled substances, for which I must give specific authorization.  A Photostat of this authorization shall be as valid as the original.   Date Place   Employee’s Signature   THIS REPORT MUST BE COMPLETED AND MAILED WITHIN 3 WORKING DAYS OF TREATMENT   Place  [facility], EMERGENCY DEPT  Name of Facility   [facility]   Date  10/5/2019 Diagnosis  (S60.221A) Contusion of right hand, initial encounter Is there evidence the injured employee was under the influence of alcohol and/or another controlled substance at the time of accident?   Hour  3:44 AM Description of Injury or Disease  Contusion of right hand, initial encounter No   Treatment  Splint and motrin  Have you advised the patient to remain off work five days or more?         No   X-Ray Findings  Negative   If Yes   From Date    To Date      From information given by the employee, together with medical evidence, can you directly connect this injury or occupational disease as job incurred?  Yes If No, is the employee capable of: Full Duty  No Modified Duty  Yes   Is additional medical care by a physician indicated?  Yes If Modified Duty, Specify any Limitations / Restrictions  No using right hand, until seen by workmans comp      "  Do you know of any previous injury or disease contributing to this condition or occupational disease?  No   Date  10/6/2019 Print Doctor’s Name  Kristian Swift I certify the employer’s copy of this form was mailed on:   Address  [address] [address] [phone] Insurer’s Use Only   [facility]  [address]    Provider’s Tax ID Number  [id number] Telephone  Dept: [phone]    Doctor’s Signature  e-KRISTIAN SotoOKelsey Degree   MD    Original - TREATING PHYSICIAN OR CHIROPRACTOR   Pg 2-Insurer/TPA   Pg 3-Employer   Pg 4-Employee                                                                                                  Form C-4 (rev01/03)     BRIEF DESCRIPTION OF RIGHTS AND BENEFITS  ([place]ursuant to NRS 616C.050)    Notice of Injury or Occupational Disease (Incident Report Form C-1): If an injury or occupational disease (OD) arises out of and in the course of employment, you must provide written notice to your employer as soon as practicable, but no later than 7 days after the accident or OD. Your employer shall maintain a sufficient supply of the required forms.    Claim for Compensation (Form C-4): If medical treatment is sought, the form C-4 is available at the place of initial treatment. A completed \"Claim for Compensation\" (Form C-4) must be filed within 90 days after an accident or OD. The treating physician or chiropractor must, within 3 working days after treatment, complete and mail to the employer, the employer's insurer and third-party , the Claim for Compensation.    Medical Treatment: If you require medical treatment for your on-the-job injury or OD, you may be required to select a physician or chiropractor from a list provided by your workers’ compensation insurer, if it has contracted with an Organization for Managed Care (MCO) or Preferred Provider Organization (PPO) or providers of health care. If your employer has not entered into a " contract with an MCO or PPO, you may select a physician or chiropractor from the Panel of Physicians and Chiropractors. Any medical costs related to your industrial injury or OD will be paid by your insurer.    Temporary Total Disability (TTD): If your doctor has certified that you are unable to work for a period of at least 5 consecutive days, or 5 cumulative days in a 20-day period, or places restrictions on you that your employer does not accommodate, you may be entitled to TTD compensation.    Temporary Partial Disability (TPD): If the wage you receive upon reemployment is less than the compensation for TTD to which you are entitled, the insurer may be required to pay you TPD compensation to make up the difference. TPD can only be paid for a maximum of 24 months.    Permanent Partial Disability (PPD): When your medical condition is stable and there is an indication of a PPD as a result of your injury or OD, within 30 days, your insurer must arrange for an evaluation by a rating physician or chiropractor to determine the degree of your PPD. The amount of your PPD award depends on the date of injury, the results of the PPD evaluation and your age and wage.    Permanent Total Disability (PTD): If you are medically certified by a treating physician or chiropractor as permanently and totally disabled and have been granted a PTD status by your insurer, you are entitled to receive monthly benefits not to exceed 66 2/3% of your average monthly wage. The amount of your PTD payments is subject to reduction if you previously received a PPD award.    Vocational Rehabilitation Services: You may be eligible for vocational rehabilitation services if you are unable to return to the job due to a permanent physical impairment or permanent restrictions as a result of your injury or occupational disease.    Transportation and Per John Reimbursement: You may be eligible for travel expenses and per john associated with medical  treatment.  Reopening: You may be able to reopen your claim if your condition worsens after claim closure.    Appeal Process: If you disagree with a written determination issued by the insurer or the insurer does not respond to your request, you may appeal to the Department of Administration, , by following the instructions contained in your determination letter. You must appeal the determination within 70 days from the date of the determination letter at [address], [address], [place], [place] [address], or [address], [place], [place] [address]. If you disagree with the  decision, you may appeal to the Department of Administration, . You must file your appeal within 30 days from the date of the  decision letter at [address], [address], [place], [place] [address], or [address], [address], [place], [place] [address]. If you disagree with a decision of an , you may file a petition for judicial review with the District Court. You must do so within 30 days of the Appeal Officer’s decision. You may be represented by an  at your own expense or you may contact the [facility] for possible representation.    [place]  for Inj[place] ([place]IW): If you disagree with a  decision, you may request that NAIW represent you without charge at an  Hearing. For information regarding denial of benefits, you may contact the [facility] at: [address], [address], [place] [address], [phone]) [phone], or [address], [address], [place] [address], [phone]    To File a Complaint with the Division: If you wish to file a complaint with the  of the Division of Industrial Relations (DIR), please contact the Workers’ Compensation Section, [address], [address], [place], [place] [address], telephone (571) 832-6913, or [address], [address], [place], [place] [address], telephone [phone].    For assistance with Workers’ Compensation Issues: you may contact the Office of the Governor Consumer Health Assistance, [address] [place] [address], [address], [place], [place] [address], Toll Free 1-820.147.4459, Web site: http://[employer].[facility].nv., E-mail [email or web address].                                                                                                                                                                               __________________________________________________________________                                    _________________            Employee Name / Signature                                                                                                                            Date                                       D-2 (rev. 10/07)

## 2019-10-06 NOTE — LETTER
"  FORM C-4:  EMPLOYEE’S CLAIM FOR COMPENSATION/ REPORT OF INITIAL TREATMENT  EMPLOYEE’S CLAIM - PROVIDE ALL INFORMATION REQUESTED   First Name  Mariza Last Name  Julian Birthdate             Age  1985 34 y.o. 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 (if applicable)  trying to change a resident    How did this injury or occupational disease occur? Be specific and answer in detail. Use additional sheet if necessary)  we were trying to get her standing from her recliner to change her and she kept refusing trying to sit back down grabing out hands, squeezing them   If you believe that you have an occupational disease, when did you first have knowledge of the disability and it relationship to your employment?  n/a Witnesses to the Accident  co worker     Nature of Injury or Occupational Disease  Workers' Compensation  Part(s) of Body Injured or Affected  Hand (L), Hand (R), N/A    I certify that the above is true and correct to the best of my " knowledge and that I have provided this information in order to obtain the benefits of [place] inclusive or Chapter 617 of [place]  I hereby authorize any physician, chiropractor, surgeon, practitioner, or other person, any hospital, including [facility], any medical service organization, any insurance company, or other institution or organization to release to each other, any medical or other information, including benefits paid or payable, pertinent to this injury or disease, except information relative to diagnosis, treatment and/or counseling for AIDS, psychological conditions, alcohol or controlled substances, for which I must give specific authorization.  A Photostat of this authorization shall be as valid as the original.   Date Place   Employee’s Signature   THIS REPORT MUST BE COMPLETED AND MAILED WITHIN 3 WORKING DAYS OF TREATMENT   Place  [facility], EMERGENCY DEPT  Name of Facility   [facility]   Date  10/5/2019 Diagnosis  No diagnosis found. Is there evidence the injured employee was under the influence of alcohol and/or another controlled substance at the time of accident?   Hour  1:32 AM Description of Injury or Disease       Treatment     Have you advised the patient to remain off work five days or more?             X-Ray Findings      If Yes   From Date    To Date      From information given by the employee, together with medical evidence, can you directly connect this injury or occupational disease as job incurred?    If No, is the employee capable of: Full Duty    Modified Duty      Is additional medical care by a physician indicated?    If Modified Duty, Specify any Limitations / Restrictions        Do you know of any previous injury or disease contributing to this condition or occupational disease?      Date  10/6/2019 Print Doctor’s Name  Jamison  "Kristian FORD certify the employer’s copy of this form was mailed on:   Address  [address]  [place] [address] [phone] Insurer’s Use Only   [facility]  [address]    Provider’s Tax ID Number  [id number] Telephone  Dept: [phone]    Doctor’s Signature    Degree       Original - TREATING PHYSICIAN OR CHIROPRACTOR   Pg 2-Insurer/TPA   Pg 3-Employer   Pg 4-Employee                                                                                                  Form C-4 (rev01/03)     BRIEF DESCRIPTION OF RIGHTS AND BENEFITS  (Pursuant to [place]RS 616C.050)    Notice of Injury or Occupational Disease (Incident Report Form C-1): If an injury or occupational disease (OD) arises out of and in the course of employment, you must provide written notice to your employer as soon as practicable, but no later than 7 days after the accident or OD. Your employer shall maintain a sufficient supply of the required forms.    Claim for Compensation (Form C-4): If medical treatment is sought, the form C-4 is available at the place of initial treatment. A completed \"Claim for Compensation\" (Form C-4) must be filed within 90 days after an accident or OD. The treating physician or chiropractor must, within 3 working days after treatment, complete and mail to the employer, the employer's insurer and third-party , the Claim for Compensation.    Medical Treatment: If you require medical treatment for your on-the-job injury or OD, you may be required to select a physician or chiropractor from a list provided by your workers’ compensation insurer, if it has contracted with an Organization for Managed Care (MCO) or Preferred Provider Organization (PPO) or providers of health care. If your employer has not entered into a contract with an MCO or PPO, you may select a physician or chiropractor from the Panel of Physicians and Chiropractors. Any medical costs related to your industrial injury or OD will be paid " by your insurer.    Temporary Total Disability (TTD): If your doctor has certified that you are unable to work for a period of at least 5 consecutive days, or 5 cumulative days in a 20-day period, or places restrictions on you that your employer does not accommodate, you may be entitled to TTD compensation.    Temporary Partial Disability (TPD): If the wage you receive upon reemployment is less than the compensation for TTD to which you are entitled, the insurer may be required to pay you TPD compensation to make up the difference. TPD can only be paid for a maximum of 24 months.    Permanent Partial Disability (PPD): When your medical condition is stable and there is an indication of a PPD as a result of your injury or OD, within 30 days, your insurer must arrange for an evaluation by a rating physician or chiropractor to determine the degree of your PPD. The amount of your PPD award depends on the date of injury, the results of the PPD evaluation and your age and wage.    Permanent Total Disability (PTD): If you are medically certified by a treating physician or chiropractor as permanently and totally disabled and have been granted a PTD status by your insurer, you are entitled to receive monthly benefits not to exceed 66 2/3% of your average monthly wage. The amount of your PTD payments is subject to reduction if you previously received a PPD award.    Vocational Rehabilitation Services: You may be eligible for vocational rehabilitation services if you are unable to return to the job due to a permanent physical impairment or permanent restrictions as a result of your injury or occupational disease.    Transportation and Per John Reimbursement: You may be eligible for travel expenses and per john associated with medical treatment.  Reopening: You may be able to reopen your claim if your condition worsens after claim closure.    Appeal Process: If you disagree with a written determination issued by the insurer or  the insurer does not respond to your request, you may appeal to the Department of Administration, , by following the instructions contained in your determination letter. You must appeal the determination within 70 days from the date of the determination letter at [address], [address], [place], [place] [address], or [address], [address], [place], [place] [address]. If you disagree with the  decision, you may appeal to the Department of Administration, . You must file your appeal within 30 days from the date of the  decision letter at [address], [address], [place], [place] [address], or [address], [place], [place] [address]. If you disagree with a decision of an , you may file a petition for judicial review with the District Court. You must do so within 30 days of the Appeal Officer’s decision. You may be represented by an  at your own expense or you may contact the [facility] for possible representation.    [place]  for Injured Workers (NAIW): If you disagree with a  decision, you may request that NAIW represent you without charge at an  Hearing. For information regarding denial of benefits, you may contact the [facility] at: [address], [address], [place] [address], [phone], or [address], [place] [address], [phone]    To File a Complaint with the Division: If you wish to file a complaint with the  of the Division of Industrial Relations (DIR), please contact the Workers’ Compensation Section, [address], [address], [place], [place] [address], telephone [phone], or [address], [place], [place] [address], telephone [phone].    For assistance with Workers’ Compensation Issues: you may contact the Office of the Governor Consumer Health Assistance,  [address], [place], [place] [address], Toll Free 1-900.986.8937, Web site: http://mukund.[facility].nv., E-mail [email or web address].                                                                                                                                                                               __________________________________________________________________                                    _________________            Employee Name / Signature                                                                                                                            Date                                       D-2 (rev. 10/07)

## 2019-10-06 NOTE — LETTER
"  FORM C-4:  EMPLOYEE’S CLAIM FOR COMPENSATION/ REPORT OF INITIAL TREATMENT  EMPLOYEE’S CLAIM - PROVIDE ALL INFORMATION REQUESTED   First Name  Mariza Last Name  Julian Birthdate             Age  1985 34 y.o. 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 (if applicable)  trying to change a resident    How did this injury or occupational disease occur? Be specific and answer in detail. Use additional sheet if necessary)  we were trying to get her standing from her recliner to change her and she kept refusing trying to sit back down grabing out hands, squeezing them   If you believe that you have an occupational disease, when did you first have knowledge of the disability and it relationship to your employment?  n/a Witnesses to the Accident  co worker     Nature of Injury or Occupational Disease  Workers' Compensation  Part(s) of Body Injured or Affected  Hand (L), Hand (R), N/A    I certify that the above is true and correct to the best of my " knowledge and that I have provided this information in order to obtain the benefits of [place]’s [place] ([place] 616A to 616D, inclusive or Chapter 617 of [place]RS).  I hereby authorize any physician, chiropractor, surgeon, practitioner, or other person, any hospital, including [facility], any medical service organization, any insurance company, or other institution or organization to release to each other, any medical or other information, including benefits paid or payable, pertinent to this injury or disease, except information relative to diagnosis, treatment and/or counseling for AIDS, psychological conditions, alcohol or controlled substances, for which I must give specific authorization.  A Photostat of this authorization shall be as valid as the original.   Date Place   Employee’s Signature   THIS REPORT MUST BE COMPLETED AND MAILED WITHIN 3 WORKING DAYS OF TREATMENT   Place  [facility], EMERGENCY DEPT  Name of Facility   [facility]   Date  10/5/2019 Diagnosis  No diagnosis found. Is there evidence the injured employee was under the influence of alcohol and/or another controlled substance at the time of accident?   Hour  3:11 AM Description of Injury or Disease       Treatment     Have you advised the patient to remain off work five days or more?             X-Ray Findings      If Yes   From Date    To Date      From information given by the employee, together with medical evidence, can you directly connect this injury or occupational disease as job incurred?    If No, is the employee capable of: Full Duty    Modified Duty      Is additional medical care by a physician indicated?    If Modified Duty, Specify any Limitations / Restrictions        Do you know of any previous injury or disease contributing to this condition or occupational disease?      Date  10/6/2019 Print Doctor’s Name  Jamison  "Krisitan FORD certify the employer’s copy of this form was mailed on:   Address  [address]  [place] [address] [phone] Insurer’s Use Only   [facility]  [address]    Provider’s Tax ID Number  [id number] Telephone  Dept: [phone]    Doctor’s Signature    Degree       Original - TREATING PHYSICIAN OR CHIROPRACTOR   Pg 2-Insurer/TPA   Pg 3-Employer   Pg 4-Employee                                                                                                  Form C-4 (rev01/03)     BRIEF DESCRIPTION OF RIGHTS AND BENEFITS  ([place]ursuant to [place]RS 616C.050)    Notice of Injury or Occupational Disease (Incident Report Form C-1): If an injury or occupational disease (OD) arises out of and in the course of employment, you must provide written notice to your employer as soon as practicable, but no later than 7 days after the accident or OD. Your employer shall maintain a sufficient supply of the required forms.    Claim for Compensation (Form C-4): If medical treatment is sought, the form C-4 is available at the place of initial treatment. A completed \"Claim for Compensation\" (Form C-4) must be filed within 90 days after an accident or OD. The treating physician or chiropractor must, within 3 working days after treatment, complete and mail to the employer, the employer's insurer and third-party , the Claim for Compensation.    Medical Treatment: If you require medical treatment for your on-the-job injury or OD, you may be required to select a physician or chiropractor from a list provided by your workers’ compensation insurer, if it has contracted with an Organization for Managed Care (MCO) or Preferred Provider Organization (PPO) or providers of health care. If your employer has not entered into a contract with an MCO or PPO, you may select a physician or chiropractor from the Panel of Physicians and Chiropractors. Any medical costs related to your industrial injury or OD will be paid " by your insurer.    Temporary Total Disability (TTD): If your doctor has certified that you are unable to work for a period of at least 5 consecutive days, or 5 cumulative days in a 20-day period, or places restrictions on you that your employer does not accommodate, you may be entitled to TTD compensation.    Temporary Partial Disability (TPD): If the wage you receive upon reemployment is less than the compensation for TTD to which you are entitled, the insurer may be required to pay you TPD compensation to make up the difference. TPD can only be paid for a maximum of 24 months.    Permanent Partial Disability (PPD): When your medical condition is stable and there is an indication of a PPD as a result of your injury or OD, within 30 days, your insurer must arrange for an evaluation by a rating physician or chiropractor to determine the degree of your PPD. The amount of your PPD award depends on the date of injury, the results of the PPD evaluation and your age and wage.    Permanent Total Disability (PTD): If you are medically certified by a treating physician or chiropractor as permanently and totally disabled and have been granted a PTD status by your insurer, you are entitled to receive monthly benefits not to exceed 66 2/3% of your average monthly wage. The amount of your PTD payments is subject to reduction if you previously received a PPD award.    Vocational Rehabilitation Services: You may be eligible for vocational rehabilitation services if you are unable to return to the job due to a permanent physical impairment or permanent restrictions as a result of your injury or occupational disease.    Transportation and Per John Reimbursement: You may be eligible for travel expenses and per john associated with medical treatment.  Reopening: You may be able to reopen your claim if your condition worsens after claim closure.    Appeal Process: If you disagree with a written determination issued by the insurer or  the insurer does not respond to your request, you may appeal to the Department of Administration, , by following the instructions contained in your determination letter. You must appeal the determination within 70 days from the date of the determination letter at [address], [address], [place], [place] [address], or [address], [address], [place], [place] [address]. If you disagree with the  decision, you may appeal to the Department of Administration, . You must file your appeal within 30 days from the date of the  decision letter at [address], [address], [place], [place] [address], or [address], [place], [place] [address]. If you disagree with a decision of an , you may file a petition for judicial review with the District Court. You must do so within 30 days of the Appeal Officer’s decision. You may be represented by an  at your own expense or you may contact the [facility] for possible representation.    [place]  for Injured Workers (NAIW): If you disagree with a  decision, you may request that NAIW represent you without charge at an  Hearing. For information regarding denial of benefits, you may contact the [facility] at: [address], [address], [place] [address], [phone], or [address], [place] [address], [phone]    To File a Complaint with the Division: If you wish to file a complaint with the  of the Division of Industrial Relations (DIR), please contact the Workers’ Compensation Section, [address], [address], [place], [place] [address], telephone ([phone], or [address], [place], [place] [address], telephone [phone].    For assistance with Workers’ Compensation Issues: you may contact the Office of the Governor Consumer Health Assistance,  [address], [place], [place] [address], Toll Free 1-961.263.1536, Web site: http://mukund.[facility].[place]., E-mail [email or web address].                                                                                                                                                                               __________________________________________________________________                                    _________________            Employee Name / Signature                                                                                                                            Date                                       D-2 (rev. 10/07)

## 2019-10-06 NOTE — LETTER
"  FORM C-4:  EMPLOYEE’S CLAIM FOR COMPENSATION/ REPORT OF INITIAL TREATMENT  EMPLOYEE’S CLAIM - PROVIDE ALL INFORMATION REQUESTED   First Name  Mariza Last Name  Julian Birthdate             Age  1985 34 y.o. 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 (if applicable)  trying to change a resident    How did this injury or occupational disease occur? Be specific and answer in detail. Use additional sheet if necessary)  we were trying to get her standing from her recliner to change her and she kept refusing trying to sit back down grabing out hands, squeezing them   If you believe that you have an occupational disease, when did you first have knowledge of the disability and it relationship to your employment?  n/a Witnesses to the Accident  co worker     Nature of Injury or Occupational Disease  Workers' Compensation  Part(s) of Body Injured or Affected  Hand (L), Hand (R), N/A    I certify that the above is true and correct to the best of my " knowledge and that I have provided this information in order to obtain the benefits of [place] inclusive or Chapter 617 of [place]  I hereby authorize any physician, chiropractor, surgeon, practitioner, or other person, any hospital, including [facility] or [facility], any medical service organization, any insurance company, or other institution or organization to release to each other, any medical or other information, including benefits paid or payable, pertinent to this injury or disease, except information relative to diagnosis, treatment and/or counseling for AIDS, psychological conditions, alcohol or controlled substances, for which I must give specific authorization.  A Photostat of this authorization shall be as valid as the original.   Date Place   Employee’s Signature   THIS REPORT MUST BE COMPLETED AND MAILED WITHIN 3 WORKING DAYS OF TREATMENT   Place  [facility], EMERGENCY DEPT  Name of Facility   [facility]   Date  10/5/2019 Diagnosis  No diagnosis found. Is there evidence the injured employee was under the influence of alcohol and/or another controlled substance at the time of accident?   Hour  1:41 AM Description of Injury or Disease       Treatment     Have you advised the patient to remain off work five days or more?             X-Ray Findings      If Yes   From Date    To Date      From information given by the employee, together with medical evidence, can you directly connect this injury or occupational disease as job incurred?    If No, is the employee capable of: Full Duty    Modified Duty      Is additional medical care by a physician indicated?    If Modified Duty, Specify any Limitations / Restrictions        Do you know of any previous injury or disease contributing to this condition or occupational disease?      Date  10/6/2019 Print Doctor’s Name  Jamison  "Kristian FORD certify the employer’s copy of this form was mailed on:   Address  [address]  [place] [address] [phone] Insurer’s Use Only   [facility]  [address]    Provider’s Tax ID Number  [id number] Telephone  Dept: [phone]    Doctor’s Signature    Degree       Original - TREATING PHYSICIAN OR CHIROPRACTOR   Pg 2-Insurer/TPA   Pg 3-Employer   Pg 4-Employee                                                                                                  Form C-4 (rev01/03)     BRIEF DESCRIPTION OF RIGHTS AND BENEFITS  (Pursuant to [place]RS 616C.050)    Notice of Injury or Occupational Disease (Incident Report Form C-1): If an injury or occupational disease (OD) arises out of and in the course of employment, you must provide written notice to your employer as soon as practicable, but no later than 7 days after the accident or OD. Your employer shall maintain a sufficient supply of the required forms.    Claim for Compensation (Form C-4): If medical treatment is sought, the form C-4 is available at the place of initial treatment. A completed \"Claim for Compensation\" (Form C-4) must be filed within 90 days after an accident or OD. The treating physician or chiropractor must, within 3 working days after treatment, complete and mail to the employer, the employer's insurer and third-party , the Claim for Compensation.    Medical Treatment: If you require medical treatment for your on-the-job injury or OD, you may be required to select a physician or chiropractor from a list provided by your workers’ compensation insurer, if it has contracted with an Organization for Managed Care (MCO) or Preferred Provider Organization (PPO) or providers of health care. If your employer has not entered into a contract with an MCO or PPO, you may select a physician or chiropractor from the Panel of Physicians and Chiropractors. Any medical costs related to your industrial injury or OD will be paid " by your insurer.    Temporary Total Disability (TTD): If your doctor has certified that you are unable to work for a period of at least 5 consecutive days, or 5 cumulative days in a 20-day period, or places restrictions on you that your employer does not accommodate, you may be entitled to TTD compensation.    Temporary Partial Disability (TPD): If the wage you receive upon reemployment is less than the compensation for TTD to which you are entitled, the insurer may be required to pay you TPD compensation to make up the difference. TPD can only be paid for a maximum of 24 months.    Permanent Partial Disability (PPD): When your medical condition is stable and there is an indication of a PPD as a result of your injury or OD, within 30 days, your insurer must arrange for an evaluation by a rating physician or chiropractor to determine the degree of your PPD. The amount of your PPD award depends on the date of injury, the results of the PPD evaluation and your age and wage.    Permanent Total Disability (PTD): If you are medically certified by a treating physician or chiropractor as permanently and totally disabled and have been granted a PTD status by your insurer, you are entitled to receive monthly benefits not to exceed 66 2/3% of your average monthly wage. The amount of your PTD payments is subject to reduction if you previously received a PPD award.    Vocational Rehabilitation Services: You may be eligible for vocational rehabilitation services if you are unable to return to the job due to a permanent physical impairment or permanent restrictions as a result of your injury or occupational disease.    Transportation and Per John Reimbursement: You may be eligible for travel expenses and per john associated with medical treatment.  Reopening: You may be able to reopen your claim if your condition worsens after claim closure.    Appeal Process: If you disagree with a written determination issued by the insurer or  the insurer does not respond to your request, you may appeal to the Department of Administration, , by following the instructions contained in your determination letter. You must appeal the determination within 70 days from the date of the determination letter at [address], [address], [place], [place] [address], or [address], [address], [place], [place] [address]. If you disagree with the  decision, you may appeal to the Department of Administration, . You must file your appeal within 30 days from the date of the  decision letter at [address], [address], [place], [place] [address], or [address], [place], [place] [address]. If you disagree with a decision of an , you may file a petition for judicial review with the District Court. You must do so within 30 days of the Appeal Officer’s decision. You may be represented by an  at your own expense or you may contact the [facility] for possible representation.    [place]  for Injured Workers (NAIW): If you disagree with a  decision, you may request that NAIW represent you without charge at an  Hearing. For information regarding denial of benefits, you may contact the [facility] at: [address], [address], [place] [address], [phone], or [address], [place] [address], [phone]    To File a Complaint with the Division: If you wish to file a complaint with the  of the Division of Industrial Relations (DIR), please contact the Workers’ Compensation Section, [address], [address], [place], [place] [address], telephone ([phone]) [phone], or [address], [place], [place] [address], telephone [phone].    For assistance with Workers’ Compensation Issues: you may contact the Office of the Governor Consumer Health Assistance,  [address], [place], [place] [address], Toll Free 1-100.836.7024, Web site: http://mukund.[facility].nv., E-mail [email or web address].[email or web address].                                                                                                                                                                               __________________________________________________________________                                    _________________            Employee Name / Signature                                                                                                                            Date                                       D-2 (rev. 10/07)

## 2019-10-06 NOTE — LETTER
"  FORM C-4:  EMPLOYEE’S CLAIM FOR COMPENSATION/ REPORT OF INITIAL TREATMENT  EMPLOYEE’S CLAIM - PROVIDE ALL INFORMATION REQUESTED   First Name  Mariza Last Name  Julian Birthdate             Age  1985 34 y.o. 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]   What were you doing at the time of accident? (if applicable)  trying to change a resident    How did this injury or occupational disease occur? Be specific and answer in detail. Use additional sheet if necessary)  we were trying to get her standing from her recliner to change her and she kept refusing trying to sit back down grabing out hands, squeezing them   If you believe that you have an occupational disease, when did you first have knowledge of the disability and it relationship to your employment?  n/a Witnesses to the Accident  co worker     Nature of Injury or Occupational Disease  Workers' Compensation  Part(s) of Body Injured or Affected  Hand (L), Hand (R), N/A    I certify that the above is true and correct to the best of my " knowledge and that I have provided this information in order to obtain the benefits of [place]’s [place] ([place] 616A to 616[place], inclusive or Chapter 617 of [place]RS).  I hereby authorize any physician, chiropractor, surgeon, practitioner, or other person, any hospital, including [facility] or [facility], any medical service organization, any insurance company, or other institution or organization to release to each other, any medical or other information, including benefits paid or payable, pertinent to this injury or disease, except information relative to diagnosis, treatment and/or counseling for AIDS, psychological conditions, alcohol or controlled substances, for which I must give specific authorization.  A Photostat of this authorization shall be as valid as the original.   Date Place   Employee’s Signature   THIS REPORT MUST BE COMPLETED AND MAILED WITHIN 3 WORKING DAYS OF TREATMENT   Place  [facility], EMERGENCY DEPT  Name of Facility   [facility]   Date  10/5/2019 Diagnosis  No diagnosis found. Is there evidence the injured employee was under the influence of alcohol and/or another controlled substance at the time of accident?   Hour  2:37 AM Description of Injury or Disease       Treatment     Have you advised the patient to remain off work five days or more?             X-Ray Findings      If Yes   From Date    To Date      From information given by the employee, together with medical evidence, can you directly connect this injury or occupational disease as job incurred?    If No, is the employee capable of: Full Duty    Modified Duty      Is additional medical care by a physician indicated?    If Modified Duty, Specify any Limitations / Restrictions        Do you know of any previous injury or disease contributing to this condition or occupational disease?      Date  10/6/2019 Print Doctor’s Name  Jamison  "Kristian FORD certify the employer’s copy of this form was mailed on:   Address  [address]  [place] [address] [phone] Insurer’s Use Only   [facility]  [address]    Provider’s Tax ID Number  [id number] Telephone  Dept: [phone]    Doctor’s Signature    Degree       Original - TREATING PHYSICIAN OR CHIROPRACTOR   Pg 2-Insurer/TPA   Pg 3-Employer   Pg 4-Employee                                                                                                  Form C-4 (rev01/03)     BRIEF DESCRIPTION OF RIGHTS AND BENEFITS  ([place]ursuant to [place]RS 616C.050)    Notice of Injury or Occupational Disease (Incident Report Form C-1): If an injury or occupational disease (OD) arises out of and in the course of employment, you must provide written notice to your employer as soon as practicable, but no later than 7 days after the accident or OD. Your employer shall maintain a sufficient supply of the required forms.    Claim for Compensation (Form C-4): If medical treatment is sought, the form C-4 is available at the place of initial treatment. A completed \"Claim for Compensation\" (Form C-4) must be filed within 90 days after an accident or OD. The treating physician or chiropractor must, within 3 working days after treatment, complete and mail to the employer, the employer's insurer and third-party , the Claim for Compensation.    Medical Treatment: If you require medical treatment for your on-the-job injury or OD, you may be required to select a physician or chiropractor from a list provided by your workers’ compensation insurer, if it has contracted with an Organization for Managed Care (MCO) or Preferred Provider Organization (PPO) or providers of health care. If your employer has not entered into a contract with an MCO or PPO, you may select a physician or chiropractor from the Panel of Physicians and Chiropractors. Any medical costs related to your industrial injury or OD will be paid " by your insurer.    Temporary Total Disability (TTD): If your doctor has certified that you are unable to work for a period of at least 5 consecutive days, or 5 cumulative days in a 20-day period, or places restrictions on you that your employer does not accommodate, you may be entitled to TTD compensation.    Temporary Partial Disability (TPD): If the wage you receive upon reemployment is less than the compensation for TTD to which you are entitled, the insurer may be required to pay you TPD compensation to make up the difference. TPD can only be paid for a maximum of 24 months.    Permanent Partial Disability (PPD): When your medical condition is stable and there is an indication of a PPD as a result of your injury or OD, within 30 days, your insurer must arrange for an evaluation by a rating physician or chiropractor to determine the degree of your PPD. The amount of your PPD award depends on the date of injury, the results of the PPD evaluation and your age and wage.    Permanent Total Disability (PTD): If you are medically certified by a treating physician or chiropractor as permanently and totally disabled and have been granted a PTD status by your insurer, you are entitled to receive monthly benefits not to exceed 66 2/3% of your average monthly wage. The amount of your PTD payments is subject to reduction if you previously received a PPD award.    Vocational Rehabilitation Services: You may be eligible for vocational rehabilitation services if you are unable to return to the job due to a permanent physical impairment or permanent restrictions as a result of your injury or occupational disease.    Transportation and Per John Reimbursement: You may be eligible for travel expenses and per john associated with medical treatment.  Reopening: You may be able to reopen your claim if your condition worsens after claim closure.    Appeal Process: If you disagree with a written determination issued by the insurer or  the insurer does not respond to your request, you may appeal to the Department of Administration, , by following the instructions contained in your determination letter. You must appeal the determination within 70 days from the date of the determination letter at [address], [address], [place], [place] [address], or [address], [place], [place] [address]. If you disagree with the  decision, you may appeal to the Department of Administration, . You must file your appeal within 30 days from the date of the  decision letter at [address], [address], [place], [place] [address], or [address], [place], [place] [address]. If you disagree with a decision of an , you may file a petition for judicial review with the District Court. You must do so within 30 days of the Appeal Officer’s decision. You may be represented by an  at your own expense or you may contact the [facility] for possible representation.    [place]  for In[place] Workers ([place]): If you disagree with a  decision, you may request that NAIW represent you without charge at an  Hearing. For information regarding denial of benefits, you may contact the [facility] at: [address], [address], [place] [address], [phone], or [address], [place] [address], [phone]    To File a Complaint with the Division: If you wish to file a complaint with the  of the Division of Industrial Relations (DIR), please contact the Workers’ Compensation Section, [address], [address], [place], [place] [address], telephone ([phone]) [phone], or [address], [place], [place] [address], telephone [phone].    For assistance with Workers’ Compensation Issues: you may contact the Office of the Governor Consumer Health Assistance,  [address], [place], [place] [address], Toll Free 1-488.602.8752, Web site: http://mukund.[facility].[place]., E-mail [email or web address].[email or web address].                                                                                                                                                                               __________________________________________________________________                                    _________________            Employee Name / Signature                                                                                                                            Date                                       D-2 (rev. 10/07)

## 2019-10-06 NOTE — LETTER
"  FORM C-4:  EMPLOYEE’S CLAIM FOR COMPENSATION/ REPORT OF INITIAL TREATMENT  EMPLOYEE’S CLAIM - PROVIDE ALL INFORMATION REQUESTED   First Name  Mariza Last Name  Julian Birthdate             Age  1985 34 y.o. 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 (if applicable)  trying to change a resident    How did this injury or occupational disease occur? Be specific and answer in detail. Use additional sheet if necessary)  we were trying to get her standing from her recliner to change her and she kept refusing trying to sit back down grabing out hands, squeezing them   If you believe that you have an occupational disease, when did you first have knowledge of the disability and it relationship to your employment?  n/a Witnesses to the Accident  co worker     Nature of Injury or Occupational Disease  Workers' Compensation  Part(s) of Body Injured or Affected  Hand (L), Hand (R), N/A    I certify that the above is true and correct to the best of my " knowledge and that I have provided this information in order to obtain the benefits of [place] or Chapter 617 of [place]  I hereby authorize any physician, chiropractor, surgeon, practitioner, or other person, any hospital, including [facility] or [facility], any medical service organization, any insurance company, or other institution or organization to release to each other, any medical or other information, including benefits paid or payable, pertinent to this injury or disease, except information relative to diagnosis, treatment and/or counseling for AIDS, psychological conditions, alcohol or controlled substances, for which I must give specific authorization.  A Photostat of this authorization shall be as valid as the original.   Date Place   Employee’s Signature   THIS REPORT MUST BE COMPLETED AND MAILED WITHIN 3 WORKING DAYS OF TREATMENT   Place  [facility], EMERGENCY DEPT  Name of Facility   [facility]   Date  10/5/2019 Diagnosis  No diagnosis found. Is there evidence the injured employee was under the influence of alcohol and/or another controlled substance at the time of accident?   Hour  1:53 AM Description of Injury or Disease       Treatment     Have you advised the patient to remain off work five days or more?             X-Ray Findings      If Yes   From Date    To Date      From information given by the employee, together with medical evidence, can you directly connect this injury or occupational disease as job incurred?    If No, is the employee capable of: Full Duty    Modified Duty      Is additional medical care by a physician indicated?    If Modified Duty, Specify any Limitations / Restrictions        Do you know of any previous injury or disease contributing to this condition or occupational disease?      Date  10/6/2019 Print Doctor’s Name  Jamison  "Kristian FORD certify the employer’s copy of this form was mailed on:   Address  [address]  [place] [address] [phone] Insurer’s Use Only   [facility]  [address]    Provider’s Tax ID Number  [id number] Telephone  Dept: [phone]    Doctor’s Signature    Degree       Original - TREATING PHYSICIAN OR CHIROPRACTOR   Pg 2-Insurer/TPA   Pg 3-Employer   Pg 4-Employee                                                                                                  Form C-4 (rev01/03)     BRIEF DESCRIPTION OF RIGHTS AND BENEFITS  (Pursuant to NRS 616C.050)    Notice of Injury or Occupational Disease (Incident Report Form C-1): If an injury or occupational disease (OD) arises out of and in the course of employment, you must provide written notice to your employer as soon as practicable, but no later than 7 days after the accident or OD. Your employer shall maintain a sufficient supply of the required forms.    Claim for Compensation (Form C-4): If medical treatment is sought, the form C-4 is available at the place of initial treatment. A completed \"Claim for Compensation\" (Form C-4) must be filed within 90 days after an accident or OD. The treating physician or chiropractor must, within 3 working days after treatment, complete and mail to the employer, the employer's insurer and third-party , the Claim for Compensation.    Medical Treatment: If you require medical treatment for your on-the-job injury or OD, you may be required to select a physician or chiropractor from a list provided by your workers’ compensation insurer, if it has contracted with an Organization for Managed Care (MCO) or Preferred Provider Organization (PPO) or providers of health care. If your employer has not entered into a contract with an MCO or PPO, you may select a physician or chiropractor from the Panel of Physicians and Chiropractors. Any medical costs related to your industrial injury or OD will be paid " by your insurer.    Temporary Total Disability (TTD): If your doctor has certified that you are unable to work for a period of at least 5 consecutive days, or 5 cumulative days in a 20-day period, or places restrictions on you that your employer does not accommodate, you may be entitled to TTD compensation.    Temporary Partial Disability (TPD): If the wage you receive upon reemployment is less than the compensation for TTD to which you are entitled, the insurer may be required to pay you TPD compensation to make up the difference. TPD can only be paid for a maximum of 24 months.    Permanent Partial Disability (PPD): When your medical condition is stable and there is an indication of a PPD as a result of your injury or OD, within 30 days, your insurer must arrange for an evaluation by a rating physician or chiropractor to determine the degree of your PPD. The amount of your PPD award depends on the date of injury, the results of the PPD evaluation and your age and wage.    Permanent Total Disability (PTD): If you are medically certified by a treating physician or chiropractor as permanently and totally disabled and have been granted a PTD status by your insurer, you are entitled to receive monthly benefits not to exceed 66 2/3% of your average monthly wage. The amount of your PTD payments is subject to reduction if you previously received a PPD award.    Vocational Rehabilitation Services: You may be eligible for vocational rehabilitation services if you are unable to return to the job due to a permanent physical impairment or permanent restrictions as a result of your injury or occupational disease.    Transportation and Per John Reimbursement: You may be eligible for travel expenses and per john associated with medical treatment.  Reopening: You may be able to reopen your claim if your condition worsens after claim closure.    Appeal Process: If you disagree with a written determination issued by the insurer or  the insurer does not respond to your request, you may appeal to the Department of Administration, , by following the instructions contained in your determination letter. You must appeal the determination within 70 days from the date of the determination letter at [address], [address], [place], [place] [address], or [address], [address], [place], [place] [address]. If you disagree with the  decision, you may appeal to the Department of Administration, . You must file your appeal within 30 days from the date of the  decision letter at [address], [address], [place], [place] [address], or [address], [place], [place] [address]. If you disagree with a decision of an , you may file a petition for judicial review with the District Court. You must do so within 30 days of the Appeal Officer’s decision. You may be represented by an  at your own expense or you may contact the [facility] for possible representation.    [place]  for Injured Workers (NAIW): If you disagree with a  decision, you may request that NAIW represent you without charge at an  Hearing. For information regarding denial of benefits, you may contact the [facility] at: [address], [address], [place] [address], [phone], or [address], [place] [address], [phone]    To File a Complaint with the Division: If you wish to file a complaint with the  of the Division of Industrial Relations (DIR), please contact the Workers’ Compensation Section, [address], [address], [place], [place] [address], telephone [phone], or [address], [place], [place] [address], telephone [phone].    For assistance with Workers’ Compensation Issues: you may contact the Office of the Governor Consumer Health Assistance,  [address], [place], [place] [address], Toll Free 1-470.591.5447, Web site: http://mukund.[facility].nv., E-mail [email or web address].                                                                                                                                                                               __________________________________________________________________                                    _________________            Employee Name / Signature                                                                                                                            Date                                       D-2 (rev. 10/07)

## 2019-10-06 NOTE — ED NOTES
Discharge instructions and prescriptions discussed with pt. Pt verbalized understanding. Discharged ambulatory.

## 2019-10-06 NOTE — LETTER
"  FORM C-4:  EMPLOYEE’S CLAIM FOR COMPENSATION/ REPORT OF INITIAL TREATMENT  EMPLOYEE’S CLAIM - PROVIDE ALL INFORMATION REQUESTED   First Name  Mariza Last Name  Julian Birthdate             Age  1985 34 y.o. 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 (if applicable)  trying to change a resident    How did this injury or occupational disease occur? Be specific and answer in detail. Use additional sheet if necessary)  we were trying to get her standing from her recliner to change her and she kept refusing trying to sit back down grabing out hands, squeezing them   If you believe that you have an occupational disease, when did you first have knowledge of the disability and it relationship to your employment?  n/a Witnesses to the Accident  co worker     Nature of Injury or Occupational Disease  Workers' Compensation  Part(s) of Body Injured or Affected  Hand (L), Hand (R), N/A    I certify that the above is true and correct to the best of my " knowledge and that I have provided this information in order to obtain the benefits of [place]’s [place] and [place] ([place] 616A [place], inclusive or Chapter 617 of [place]RS).  I hereby authorize any physician, chiropractor, surgeon, practitioner, or other person, any hospital, including [facility] or [facility], any medical service organization, any insurance company, or other institution or organization to release to each other, any medical or other information, including benefits paid or payable, pertinent to this injury or disease, except information relative to diagnosis, treatment and/or counseling for AIDS, psychological conditions, alcohol or controlled substances, for which I must give specific authorization.  A Photostat of this authorization shall be as valid as the original.   Date Place   Employee’s Signature   THIS REPORT MUST BE COMPLETED AND MAILED WITHIN 3 WORKING DAYS OF TREATMENT   Place  [facility], EMERGENCY DEPT  Name of Facility   [facility]   Date  10/5/2019 Diagnosis  No diagnosis found. Is there evidence the injured employee was under the influence of alcohol and/or another controlled substance at the time of accident?   Hour  1:21 AM Description of Injury or Disease       Treatment     Have you advised the patient to remain off work five days or more?             X-Ray Findings      If Yes   From Date    To Date      From information given by the employee, together with medical evidence, can you directly connect this injury or occupational disease as job incurred?    If No, is the employee capable of: Full Duty    Modified Duty      Is additional medical care by a physician indicated?    If Modified Duty, Specify any Limitations / Restrictions        Do you know of any previous injury or disease contributing to this condition or occupational disease?      Date  10/6/2019 Print Doctor’s Name  Jamison  "Kristian FORD certify the employer’s copy of this form was mailed on:   Address  [address]  [place] [address] [phone] Insurer’s Use Only   [facility]  [address]    Provider’s Tax ID Number  [id number] Telephone  Dept: [phone]    Doctor’s Signature    Degree       Original - TREATING PHYSICIAN OR CHIROPRACTOR   Pg 2-Insurer/TPA   Pg 3-Employer   Pg 4-Employee                                                                                                  Form C-4 (rev01/03)     BRIEF DESCRIPTION OF RIGHTS AND BENEFITS  ([place]ursuant to [place]RS 616C.050)    Notice of Injury or Occupational Disease (Incident Report Form C-1): If an injury or occupational disease (OD) arises out of and in the course of employment, you must provide written notice to your employer as soon as practicable, but no later than 7 days after the accident or OD. Your employer shall maintain a sufficient supply of the required forms.    Claim for Compensation (Form C-4): If medical treatment is sought, the form C-4 is available at the place of initial treatment. A completed \"Claim for Compensation\" (Form C-4) must be filed within 90 days after an accident or OD. The treating physician or chiropractor must, within 3 working days after treatment, complete and mail to the employer, the employer's insurer and third-party , the Claim for Compensation.    Medical Treatment: If you require medical treatment for your on-the-job injury or OD, you may be required to select a physician or chiropractor from a list provided by your workers’ compensation insurer, if it has contracted with an Organization for Managed Care (MCO) or Preferred Provider Organization (PPO) or providers of health care. If your employer has not entered into a contract with an MCO or PPO, you may select a physician or chiropractor from the Panel of Physicians and Chiropractors. Any medical costs related to your industrial injury or OD will be paid " by your insurer.    Temporary Total Disability (TTD): If your doctor has certified that you are unable to work for a period of at least 5 consecutive days, or 5 cumulative days in a 20-day period, or places restrictions on you that your employer does not accommodate, you may be entitled to TTD compensation.    Temporary Partial Disability (TPD): If the wage you receive upon reemployment is less than the compensation for TTD to which you are entitled, the insurer may be required to pay you TPD compensation to make up the difference. TPD can only be paid for a maximum of 24 months.    Permanent Partial Disability (PPD): When your medical condition is stable and there is an indication of a PPD as a result of your injury or OD, within 30 days, your insurer must arrange for an evaluation by a rating physician or chiropractor to determine the degree of your PPD. The amount of your PPD award depends on the date of injury, the results of the PPD evaluation and your age and wage.    Permanent Total Disability (PTD): If you are medically certified by a treating physician or chiropractor as permanently and totally disabled and have been granted a PTD status by your insurer, you are entitled to receive monthly benefits not to exceed 66 2/3% of your average monthly wage. The amount of your PTD payments is subject to reduction if you previously received a PPD award.    Vocational Rehabilitation Services: You may be eligible for vocational rehabilitation services if you are unable to return to the job due to a permanent physical impairment or permanent restrictions as a result of your injury or occupational disease.    Transportation and Per John Reimbursement: You may be eligible for travel expenses and per john associated with medical treatment.  Reopening: You may be able to reopen your claim if your condition worsens after claim closure.    Appeal Process: If you disagree with a written determination issued by the insurer or  the insurer does not respond to your request, you may appeal to the Department of Administration, , by following the instructions contained in your determination letter. You must appeal the determination within 70 days from the date of the determination letter at [address], [address], [place], [place] [address], or [address], [address], [place], [place] [address]. If you disagree with the  decision, you may appeal to the Department of Administration, . You must file your appeal within 30 days from the date of the  decision letter at [address], [address], [place], [place] [address], or [address], [place], [place] [address]. If you disagree with a decision of an , you may file a petition for judicial review with the District Court. You must do so within 30 days of the Appeal Officer’s decision. You may be represented by an  at your own expense or you may contact the [facility] for possible representation.    [place]  for Injured Workers ([place]): If you disagree with a  decision, you may request that NAIW represent you without charge at an  Hearing. For information regarding denial of benefits, you may contact the [facility] at: [address], [address], [place] [address], [phone], or [address], [place] [address], [phone]    To File a Complaint with the Division: If you wish to file a complaint with the  of the Division of Industrial Relations (DIR), please contact the Workers’ Compensation Section, [address], [address], [place], [place] [address], telephone (230) [phone], or [address], [place], [place] [address], telephone [phone].    For assistance with Workers’ Compensation Issues: you may contact the Office of the Governor Consumer Health Assistance,  [address], [place], [place] [address], Toll Free 1-768.315.1048, Web site: http://mukund.[facility].[place]., E-mail [email or web address].                                                                                                                                                                               __________________________________________________________________                                    _________________            Employee Name / Signature                                                                                                                            Date                                       D-2 (rev. 10/07)

## 2019-10-06 NOTE — LETTER
"  FORM C-4:  EMPLOYEE’S CLAIM FOR COMPENSATION/ REPORT OF INITIAL TREATMENT  EMPLOYEE’S CLAIM - PROVIDE ALL INFORMATION REQUESTED   First Name  Mariza Last Name  Julian Birthdate             Age  1985 34 y.o. 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 (if applicable)  trying to change a resident    How did this injury or occupational disease occur? Be specific and answer in detail. Use additional sheet if necessary)  we were trying to get her standing from her recliner to change her and she kept refusing trying to sit back down grabing out hands, squeezing them   If you believe that you have an occupational disease, when did you first have knowledge of the disability and it relationship to your employment?  n/a Witnesses to the Accident  co worker     Nature of Injury or Occupational Disease  Workers' Compensation  Part(s) of Body Injured or Affected  Hand (L), Hand (R), N/A    I certify that the above is true and correct to the best of my " knowledge and that I have provided this information in order to obtain the benefits of [place]  I hereby authorize any physician, chiropractor, surgeon, practitioner, or other person, any hospital, including [facility] or [facility], any medical service organization, any insurance company, or other institution or organization to release to each other, any medical or other information, including benefits paid or payable, pertinent to this injury or disease, except information relative to diagnosis, treatment and/or counseling for AIDS, psychological conditions, alcohol or controlled substances, for which I must give specific authorization.  A Photostat of this authorization shall be as valid as the original.   Date  10/06/2019 Place  [facility] Employee’s Signature   THIS REPORT MUST BE COMPLETED AND MAILED WITHIN 3 WORKING DAYS OF TREATMENT   Place  [facility], EMERGENCY DEPT  Name of Facility   [facility]   Date  10/5/2019 Diagnosis  (S60.221A) Contusion of right hand, initial encounter Is there evidence the injured employee was under the influence of alcohol and/or another controlled substance at the time of accident?   Hour  3:48 AM Description of Injury or Disease  Contusion of right hand, initial encounter No   Treatment  Splint and motrin  Have you advised the patient to remain off work five days or more?         No   X-Ray Findings  Negative   If Yes   From Date    To Date      From information given by the employee, together with medical evidence, can you directly connect this injury or occupational disease as job incurred?  Yes If No, is the employee capable of: Full Duty  No Modified Duty  Yes   Is additional medical care by a physician indicated?  Yes If Modified Duty, Specify any Limitations / Restrictions  No using right hand, until seen by  "workmans comp     Do you know of any previous injury or disease contributing to this condition or occupational disease?  No   Date  10/6/2019 Print Doctor’s Name  Kristian Swift certify the employer’s copy of this form was mailed on:   Address  [address] [place] [address] [phone] Insurer’s Use Only   [facility]  [address]    Provider’s Tax ID Number  [id number] Telephone  Dept: [phone]    Doctor’s Signature  e-KRISTIAN Soto D.O. Degree   MD    Original - TREATING PHYSICIAN OR CHIROPRACTOR   Pg 2-Insurer/TPA   Pg 3-Employer   Pg 4-Employee                                                                                                  Form C-4 (rev01/03)     BRIEF DESCRIPTION OF RIGHTS AND BENEFITS  (Pursuant to [place] 616C.050)    Notice of Injury or Occupational Disease (Incident Report Form C-1): If an injury or occupational disease (OD) arises out of and in the course of employment, you must provide written notice to your employer as soon as practicable, but no later than 7 days after the accident or OD. Your employer shall maintain a sufficient supply of the required forms.  Claim for Compensation (Form C-4): If medical treatment is sought, the form C-4 is available at the place of initial treatment. A completed \"Claim for Compensation\" (Form C-4) must be filed within 90 days after an accident or OD. The treating physician or chiropractor must, within 3 working days after treatment, complete and mail to the employer, the employer's insurer and third-party , the Claim for Compensation.  Medical Treatment: If you require medical treatment for your on-the-job injury or OD, you may be required to select a physician or chiropractor from a list provided by your workers’ compensation insurer, if it has contracted with an Organization for Managed Care (MCO) or Preferred Provider Organization (PPO) or providers of health care. If your employer has not entered " into a contract with an MCO or PPO, you may select a physician or chiropractor from the Panel of Physicians and Chiropractors. Any medical costs related to your industrial injury or OD will be paid by your insurer  Temporary Total Disability (TTD): If your doctor has certified that you are unable to work for a period of at least 5 consecutive days, or 5 cumulative days in a 20-day period, or places restrictions on you that your employer does not accommodate, you may be entitled to TTD compensation.  Temporary Partial Disability (TPD): If the wage you receive upon reemployment is less than the compensation for TTD to which you are entitled, the insurer may be required to pay you TPD compensation to make up the difference. TPD can only be paid for a maximum of 24 months.  Permanent Partial Disability (PPD): When your medical condition is stable and there is an indication of a PPD as a result of your injury or OD, within 30 days, your insurer must arrange for an evaluation by a rating physician or chiropractor to determine the degree of your PPD. The amount of your PPD award depends on the date of injury, the results of the PPD evaluation and your age and wage.  Permanent Total Disability (PTD): If you are medically certified by a treating physician or chiropractor as permanently and totally disabled and have been granted a PTD status by your insurer, you are entitled to receive monthly benefits not to exceed 66 2/3% of your average monthly wage. The amount of your PTD payments is subject to reduction if you previously received a PPD award.  Vocational Rehabilitation Services: You may be eligible for vocational rehabilitation services if you are unable to return to the job due to a permanent physical impairment or permanent restrictions as a result of your injury or occupational disease.  Transportation and Per John Reimbursement: You may be eligible for travel expenses and per john associated with medical  treatment.  Reopening: You may be able to reopen your claim if your condition worsens after claim closure.  Appeal Process: If you disagree with a written determination issued by the insurer or the insurer does not respond to your request, you may appeal to the Department of Administration, , by following the instructions contained in your determination letter. You must appeal the determination within 70 days from the date of the determination letter at [address], [address], [place], [place] [address], or [address], [place], [place] [address]. If you disagree with the  decision, you may appeal to the Department of Administration, . You must file your appeal within 30 days from the date of the  decision letter at [address], [address], [place], [place] [address], or [address], [place], [place] [address]. If you disagree with a decision of an , you may file a petition for judicial review with the District Court. You must do so within 30 days of the Appeal Officer’s decision. You may be represented by an  at your own expense or you may contact the [facility] for possible representation.  [place]  for Injured Workers (NAIW): If you disagree with a  decision, you may request that NAIW represent you without charge at an  Hearing. For information regarding denial of benefits, you may contact the [facility] at: [address], [address], [place] [address], [phone], or [address], [address], [place] [address], [phone]  To File a Complaint with the Division: If you wish to file a complaint with the  of the Division of Industrial Relations (DIR), please contact the Workers’ Compensation Section, [address], [address], [place], [place] [address], telephone ([phone]) [phone], or [address], [address], [place], [place] [address], telephone [phone].  For assistance with Workers’ Compensation Issues: you may contact the Office of the Governor Consumer Health Assistance, [address] [place] [address], [address], [place], [place] [address], Toll Free 1-575.500.8148, Web site: [email or web address]., E-mail [email or web address].                                                                                                                                                                               __________________________________________________________________                                    _________________            Employee Name / Signature                                                                                                                            Date                                       D-2 (rev. 10/07)

## 2019-10-06 NOTE — ED PROVIDER NOTES
ED Provider Note    CHIEF COMPLAINT  Chief Complaint   Patient presents with   • Hand Injury       HPI  Mariza Jordan is a 34 y.o. female here for evaluation of right hand pain.  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  Patient states that she has right dorsal hand pain, with some mild radiation to the right wrist.  She states that touching the hand on the top makes it worse, and remaining still alleviates this issue.  She states that she is able to flex and extend her wrist, without any pain to the wrist.  She has no fever no chills, she did not fall have any other injuries.  She has no chest pain, no shortness breath, no fever chills, no abdominal pain.  She has not taken any prior to arrival for the same.  She describes the pain is aching.      PAST MEDICAL HISTORY   has a past medical history of Breath shortness (01/03/2019), Diverticulosis, PCOS (polycystic ovarian syndrome), Psychiatric problem (01/03/2019), Sleep apnea (01/03/2019), and Snoring.    SOCIAL HISTORY  Social History     Tobacco Use   • Smoking status: Current Every Day Smoker     Packs/day: 0.15     Types: Cigarettes   • Smokeless tobacco: Never Used   Substance and Sexual Activity   • Alcohol use: Not Currently     Comment: 1 per week   • Drug use: Not Currently     Types: Inhaled     Comment: marijuana/vape   • Sexual activity: Not on file       SURGICAL HISTORY   has a past surgical history that includes other abdominal surgery; gyn surgery (2007/2009); other (2012); and colonoscopy (N/A, 1/9/2019).    CURRENT MEDICATIONS  Home Medications    **Home medications have not yet been reviewed for this encounter**         ALLERGIES  No Known Allergies    REVIEW OF SYSTEMS  See HPI for further details. Review of systems as above, otherwise all other systems are negative.     PHYSICAL EXAM  VITAL SIGNS: /84   Pulse 92   Temp 36.2  "°C (97.2 °F) (Temporal)   Resp 18   Ht 1.6 m (5' 3\")   Wt (!) 127 kg (280 lb)   SpO2 98%   BMI 49.60 kg/m²     Constitutional: Well developed, well nourished. No acute distress.  HEENT: Normocephalic, atraumatic. MMM  Neck: Supple, Full range of motion   Chest/Pulmonary:  No respiratory distress.  Equal expansion   Musculoskeletal: No deformity, no edema, neurovascular intact.  Right upper extremity; tenderness to the dorsum of the hand, nontender right wrist, nontender right elbow.  Neurovascular intact distally.  Neuro: Clear speech, appropriate, cooperative, cranial nerves II-XII grossly intact.  Psych: Normal mood and affect    CT-HAND W/O PLUS RECONS RIGHT   Final Result      1.  There is no evidence of right scaphoid bone fracture.   2.  Unremarkable study.      DX-HAND 3+ RIGHT   Final Result      There is mild trabecular alteration in the body of the scaphoid concerning for nondisplaced right scaphoid fracture. Please correlate clinically for tenderness. If needed this can be further evaluated with the CT scan or MRI.              PROCEDURES     MEDICAL RECORD  I have reviewed patient's medical record and pertinent results are listed above.    COURSE & MEDICAL DECISION MAKING  I have reviewed any medical record information, laboratory studies and radiographic results as noted above.    3:35 AM  The pt will be placed in a velcro splint for comfort, and will follow up with workmans comp.     I you have had any blood pressure issues while here in the emergency department, please see your doctor for a further evaluation or work up.    Differential diagnoses include but not limited to: contusion vs strain    This patient presents with right hand contusion .  At this time, I have counseled the patient/family regarding their medications, pain control, and follow up.  They will continue their medications, if any, as prescribed.  They will return immediately for any worsening symptoms and/or any other medical " concerns.  They will see their doctor, or contact the doctor provided, in 1-2 days for follow up.       FINAL IMPRESSION  Right hand contusion       Electronically signed by: Kristian Swift, 10/6/2019 12:53 AM

## 2019-10-06 NOTE — LETTER
"  FORM C-4:  EMPLOYEE’S CLAIM FOR COMPENSATION/ REPORT OF INITIAL TREATMENT  EMPLOYEE’S CLAIM - PROVIDE ALL INFORMATION REQUESTED   First Name  Mariza Last Name  Julian Birthdate             Age  1985 34 y.o. 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 (if applicable)  trying to change a resident    How did this injury or occupational disease occur? Be specific and answer in detail. Use additional sheet if necessary)  we were trying to get her standing from her recliner to change her and she kept refusing trying to sit back down grabing out hands, squeezing them   If you believe that you have an occupational disease, when did you first have knowledge of the disability and it relationship to your employment?  n/a Witnesses to the Accident  co worker     Nature of Injury or Occupational Disease  Workers' Compensation  Part(s) of Body Injured or Affected  Hand (L), Hand (R), N/A    I certify that the above is true and correct to the best of my " knowledge and that I have provided this information in order to obtain the benefits of [place] inclusive or Chapter 617 of [place]).  I hereby authorize any physician, chiropractor, surgeon, practitioner, or other person, any hospital, including [facility], any medical service organization, any insurance company, or other institution or organization to release to each other, any medical or other information, including benefits paid or payable, pertinent to this injury or disease, except information relative to diagnosis, treatment and/or counseling for AIDS, psychological conditions, alcohol or controlled substances, for which I must give specific authorization.  A Photostat of this authorization shall be as valid as the original.   Date Place   Employee’s Signature   THIS REPORT MUST BE COMPLETED AND MAILED WITHIN 3 WORKING DAYS OF TREATMENT   Place  [facility], EMERGENCY DEPT  Name of Facility   [facility]   Date  10/5/2019 Diagnosis  No diagnosis found. Is there evidence the injured employee was under the influence of alcohol and/or another controlled substance at the time of accident?   Hour  1:35 AM Description of Injury or Disease       Treatment     Have you advised the patient to remain off work five days or more?             X-Ray Findings      If Yes   From Date    To Date      From information given by the employee, together with medical evidence, can you directly connect this injury or occupational disease as job incurred?    If No, is the employee capable of: Full Duty    Modified Duty      Is additional medical care by a physician indicated?    If Modified Duty, Specify any Limitations / Restrictions        Do you know of any previous injury or disease contributing to this condition or occupational disease?      Date  10/6/2019 Print Doctor’s Name  Jamison  "Kristian FORD certify the employer’s copy of this form was mailed on:   Address  [address]  [place] [address] [phone] Insurer’s Use Only   [facility]  [address]    Provider’s Tax ID Number  [id number] Telephone  Dept: [phone]    Doctor’s Signature    Degree       Original - TREATING PHYSICIAN OR CHIROPRACTOR   Pg 2-Insurer/TPA   Pg 3-Employer   Pg 4-Employee                                                                                                  Form C-4 (rev01/03)     BRIEF DESCRIPTION OF RIGHTS AND BENEFITS  (Pursuant to NRS 616C.050)    Notice of Injury or Occupational Disease (Incident Report Form C-1): If an injury or occupational disease (OD) arises out of and in the course of employment, you must provide written notice to your employer as soon as practicable, but no later than 7 days after the accident or OD. Your employer shall maintain a sufficient supply of the required forms.    Claim for Compensation (Form C-4): If medical treatment is sought, the form C-4 is available at the place of initial treatment. A completed \"Claim for Compensation\" (Form C-4) must be filed within 90 days after an accident or OD. The treating physician or chiropractor must, within 3 working days after treatment, complete and mail to the employer, the employer's insurer and third-party , the Claim for Compensation.    Medical Treatment: If you require medical treatment for your on-the-job injury or OD, you may be required to select a physician or chiropractor from a list provided by your workers’ compensation insurer, if it has contracted with an Organization for Managed Care (MCO) or Preferred Provider Organization (PPO) or providers of health care. If your employer has not entered into a contract with an MCO or PPO, you may select a physician or chiropractor from the Panel of Physicians and Chiropractors. Any medical costs related to your industrial injury or OD will be paid " by your insurer.    Temporary Total Disability (TTD): If your doctor has certified that you are unable to work for a period of at least 5 consecutive days, or 5 cumulative days in a 20-day period, or places restrictions on you that your employer does not accommodate, you may be entitled to TTD compensation.    Temporary Partial Disability (TPD): If the wage you receive upon reemployment is less than the compensation for TTD to which you are entitled, the insurer may be required to pay you TPD compensation to make up the difference. TPD can only be paid for a maximum of 24 months.    Permanent Partial Disability (PPD): When your medical condition is stable and there is an indication of a PPD as a result of your injury or OD, within 30 days, your insurer must arrange for an evaluation by a rating physician or chiropractor to determine the degree of your PPD. The amount of your PPD award depends on the date of injury, the results of the PPD evaluation and your age and wage.    Permanent Total Disability (PTD): If you are medically certified by a treating physician or chiropractor as permanently and totally disabled and have been granted a PTD status by your insurer, you are entitled to receive monthly benefits not to exceed 66 2/3% of your average monthly wage. The amount of your PTD payments is subject to reduction if you previously received a PPD award.    Vocational Rehabilitation Services: You may be eligible for vocational rehabilitation services if you are unable to return to the job due to a permanent physical impairment or permanent restrictions as a result of your injury or occupational disease.    Transportation and Per John Reimbursement: You may be eligible for travel expenses and per john associated with medical treatment.  Reopening: You may be able to reopen your claim if your condition worsens after claim closure.    Appeal Process: If you disagree with a written determination issued by the insurer or  the insurer does not respond to your request, you may appeal to the Department of Administration, , by following the instructions contained in your determination letter. You must appeal the determination within 70 days from the date of the determination letter at [address], [address], [place], [place] [address], or [address], [address], [place], [place] [address]. If you disagree with the  decision, you may appeal to the Department of Administration, . You must file your appeal within 30 days from the date of the  decision letter at [address], [address], [place], [place] [address], or [address], [place], [place] [address]. If you disagree with a decision of an , you may file a petition for judicial review with the District Court. You must do so within 30 days of the Appeal Officer’s decision. You may be represented by an  at your own expense or you may contact the [facility] for possible representation.    [place]  for Injured Workers (NAIW): If you disagree with a  decision, you may request that NAIW represent you without charge at an  Hearing. For information regarding denial of benefits, you may contact the [facility] at: [address], [address], [place] [address], [phone], or [address], [place] [address], [phone]    To File a Complaint with the Division: If you wish to file a complaint with the  of the Division of Industrial Relations (DIR), please contact the Workers’ Compensation Section, [address], [address], [place], [place] [address], telephone [phone]) [phone], or [address], [place], [place] [address], telephone [phone].    For assistance with Workers’ Compensation Issues: you may contact the Office of the Governor Consumer Health Assistance,  [address], [place], [place] [address], Toll Free 1-879.743.6307, Web site: http://mukund.[facility].nv., E-mail [email or web address].                                                                                                                                                                               __________________________________________________________________                                    _________________            Employee Name / Signature                                                                                                                            Date                                       D-2 (rev. 10/07)

## 2019-10-06 NOTE — LETTER
"  FORM C-4:  EMPLOYEE’S CLAIM FOR COMPENSATION/ REPORT OF INITIAL TREATMENT  EMPLOYEE’S CLAIM - PROVIDE ALL INFORMATION REQUESTED   First Name  Mariza Last Name  Julian Birthdate             Age  1985 34 y.o. 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 (if applicable)  trying to change a resident    How did this injury or occupational disease occur? Be specific and answer in detail. Use additional sheet if necessary)  we were trying to get her standing from her recliner to change her and she kept refusing trying to sit back down grabing out hands, squeezing them   If you believe that you have an occupational disease, when did you first have knowledge of the disability and it relationship to your employment?  n/a Witnesses to the Accident  co worker     Nature of Injury or Occupational Disease  Workers' Compensation  Part(s) of Body Injured or Affected  Hand (L), Hand (R), N/A    I certify that the above is true and correct to the best of my " knowledge and that I have provided this information in order to obtain the benefits of [place]’s [place] ([place] 616A to 616[place], inclusive or Chapter 617 of [place]RS).  I hereby authorize any physician, chiropractor, surgeon, practitioner, or other person, any hospital, including [facility], any medical service organization, any insurance company, or other institution or organization to release to each other, any medical or other information, including benefits paid or payable, pertinent to this injury or disease, except information relative to diagnosis, treatment and/or counseling for AIDS, psychological conditions, alcohol or controlled substances, for which I must give specific authorization.  A Photostat of this authorization shall be as valid as the original.   Date Place   Employee’s Signature   THIS REPORT MUST BE COMPLETED AND MAILED WITHIN 3 WORKING DAYS OF TREATMENT   Place  [facility], EMERGENCY DEPT  Name of Facility   [facility]   Date  10/5/2019 Diagnosis  (S60.221A) Contusion of right hand, initial encounter Is there evidence the injured employee was under the influence of alcohol and/or another controlled substance at the time of accident?   Hour  3:47 AM Description of Injury or Disease  Contusion of right hand, initial encounter No   Treatment  Splint and motrin  Have you advised the patient to remain off work five days or more?         No   X-Ray Findings  Negative   If Yes   From Date    To Date      From information given by the employee, together with medical evidence, can you directly connect this injury or occupational disease as job incurred?  Yes If No, is the employee capable of: Full Duty  No Modified Duty  Yes   Is additional medical care by a physician indicated?  Yes If Modified Duty, Specify any Limitations / Restrictions  No using right hand, until seen by workmans comp      "  Do you know of any previous injury or disease contributing to this condition or occupational disease?  No   Date  10/6/2019 Print Doctor’s Name  Kristian Swift I certify the employer’s copy of this form was mailed on:   Address  [address] [address] [phone] Insurer’s Use Only   [facility]  [address]    Provider’s Tax ID Number  [id number] Telephone  Dept: [phone]    Doctor’s Signature  e-KRISTIAN Soto D.OKelsey Degree       Original - TREATING PHYSICIAN OR CHIROPRACTOR   Pg 2-Insurer/TPA   Pg 3-Employer   Pg 4-Employee                                                                                                  Form C-4 (rev01/03)     BRIEF DESCRIPTION OF RIGHTS AND BENEFITS  (Pursuant to NRS 616C.050)    Notice of Injury or Occupational Disease (Incident Report Form C-1): If an injury or occupational disease (OD) arises out of and in the course of employment, you must provide written notice to your employer as soon as practicable, but no later than 7 days after the accident or OD. Your employer shall maintain a sufficient supply of the required forms.    Claim for Compensation (Form C-4): If medical treatment is sought, the form C-4 is available at the place of initial treatment. A completed \"Claim for Compensation\" (Form C-4) must be filed within 90 days after an accident or OD. The treating physician or chiropractor must, within 3 working days after treatment, complete and mail to the employer, the employer's insurer and third-party , the Claim for Compensation.    Medical Treatment: If you require medical treatment for your on-the-job injury or OD, you may be required to select a physician or chiropractor from a list provided by your workers’ compensation insurer, if it has contracted with an Organization for Managed Care (MCO) or Preferred Provider Organization (PPO) or providers of health care. If your employer has not entered into a " contract with an MCO or PPO, you may select a physician or chiropractor from the Panel of Physicians and Chiropractors. Any medical costs related to your industrial injury or OD will be paid by your insurer.    Temporary Total Disability (TTD): If your doctor has certified that you are unable to work for a period of at least 5 consecutive days, or 5 cumulative days in a 20-day period, or places restrictions on you that your employer does not accommodate, you may be entitled to TTD compensation.    Temporary Partial Disability (TPD): If the wage you receive upon reemployment is less than the compensation for TTD to which you are entitled, the insurer may be required to pay you TPD compensation to make up the difference. TPD can only be paid for a maximum of 24 months.    Permanent Partial Disability (PPD): When your medical condition is stable and there is an indication of a PPD as a result of your injury or OD, within 30 days, your insurer must arrange for an evaluation by a rating physician or chiropractor to determine the degree of your PPD. The amount of your PPD award depends on the date of injury, the results of the PPD evaluation and your age and wage.    Permanent Total Disability (PTD): If you are medically certified by a treating physician or chiropractor as permanently and totally disabled and have been granted a PTD status by your insurer, you are entitled to receive monthly benefits not to exceed 66 2/3% of your average monthly wage. The amount of your PTD payments is subject to reduction if you previously received a PPD award.    Vocational Rehabilitation Services: You may be eligible for vocational rehabilitation services if you are unable to return to the job due to a permanent physical impairment or permanent restrictions as a result of your injury or occupational disease.    Transportation and Per John Reimbursement: You may be eligible for travel expenses and per john associated with medical  treatment.  Reopening: You may be able to reopen your claim if your condition worsens after claim closure.    Appeal Process: If you disagree with a written determination issued by the insurer or the insurer does not respond to your request, you may appeal to the Department of Administration, , by following the instructions contained in your determination letter. You must appeal the determination within 70 days from the date of the determination letter at [address], [address], [place], [place] [address], or [address], [place], [place] [address]. If you disagree with the  decision, you may appeal to the Department of Administration, . You must file your appeal within 30 days from the date of the  decision letter at [address], [address], [place], [place] [address], or [address], [place], [place] [address]. If you disagree with a decision of an , you may file a petition for judicial review with the District Court. You must do so within 30 days of the Appeal Officer’s decision. You may be represented by an  at your own expense or you may contact the [facility] for possible representation.    [place]  for Inj[place] Workers ([place]IW): If you disagree with a  decision, you may request that NAIW represent you without charge at an  Hearing. For information regarding denial of benefits, you may contact the [facility] at: [address], [address], [place] [address], [phone], or [address], [address], [place] [address], [phone]    To File a Complaint with the Division: If you wish to file a complaint with the  of the Division of Industrial Relations (DIR), please contact the Workers’ Compensation Section, [address], [address], [place], [place] [address], telephone [phone], or [address], [address], [place], [place] [address], telephone [phone].    For assistance with Workers’ Compensation Issues: you may contact the Office of the Governor Consumer Health Assistance, [address] [place] [address], [address], [place], [place] [address], Toll Free 1-671.537.6529, Web site: http://Exploredge.[facility].nv., E-mail [email or web address].                                                                                                                                                                               __________________________________________________________________                                    _________________            Employee Name / Signature                                                                                                                            Date                                       D-2 (rev. 10/07)

## 2019-11-06 LAB
BREATH ALCOHOL COMMENT: NORMAL
POC BREATHALIZER: 0 PERCENT (ref 0–0.01)

## 2020-11-30 ENCOUNTER — APPOINTMENT (OUTPATIENT)
Dept: RADIOLOGY | Facility: IMAGING CENTER | Age: 35
End: 2020-11-30
Attending: PHYSICIAN ASSISTANT

## 2020-11-30 ENCOUNTER — OFFICE VISIT (OUTPATIENT)
Dept: URGENT CARE | Facility: PHYSICIAN GROUP | Age: 35
End: 2020-11-30

## 2020-11-30 VITALS
BODY MASS INDEX: 45.41 KG/M2 | HEART RATE: 103 BPM | OXYGEN SATURATION: 96 % | RESPIRATION RATE: 18 BRPM | WEIGHT: 266 LBS | HEIGHT: 64 IN | TEMPERATURE: 98.6 F | SYSTOLIC BLOOD PRESSURE: 122 MMHG | DIASTOLIC BLOOD PRESSURE: 68 MMHG

## 2020-11-30 DIAGNOSIS — S63.602A SPRAIN OF LEFT THUMB, UNSPECIFIED SITE OF DIGIT, INITIAL ENCOUNTER: ICD-10-CM
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
## 2020-11-30 DIAGNOSIS — S62.665A CLOSED NONDISPLACED FRACTURE OF DISTAL PHALANX OF LEFT RING FINGER, INITIAL ENCOUNTER: Primary | ICD-10-CM

## 2020-11-30 DIAGNOSIS — S67.195A CRUSHING INJURY OF LEFT RING FINGER, INITIAL ENCOUNTER: ICD-10-CM

## 2020-11-30 PROBLEM — Z13.6 SCREENING FOR OTHER AND UNSPECIFIED CARDIOVASCULAR CONDITIONS: Status: ACTIVE | Noted: 2019-06-27

## 2020-11-30 PROBLEM — G47.33 OBSTRUCTIVE SLEEP APNEA SYNDROME: Status: ACTIVE | Noted: 2018-03-22

## 2020-11-30 PROBLEM — L68.0 HIRSUTISM: Status: ACTIVE | Noted: 2019-06-27

## 2020-11-30 PROBLEM — E11.9 DIABETES MELLITUS, TYPE II (HCC): Status: ACTIVE | Noted: 2018-03-22

## 2020-11-30 PROBLEM — K13.0 MUCOCELE OF LOWER LIP: Status: ACTIVE | Noted: 2019-07-23

## 2020-11-30 PROBLEM — F32.9 MAJOR DEPRESSION: Status: ACTIVE | Noted: 2019-06-27

## 2020-11-30 PROBLEM — J02.0 PHARYNGITIS DUE TO STREPTOCOCCUS SPECIES: Status: ACTIVE | Noted: 2019-09-16

## 2020-11-30 PROBLEM — J35.8 TONSILLOLITH: Status: ACTIVE | Noted: 2019-01-29

## 2020-11-30 PROBLEM — H66.90 ACUTE OTITIS MEDIA: Status: ACTIVE | Noted: 2019-09-16

## 2020-11-30 PROBLEM — R35.0 URINARY FREQUENCY: Status: ACTIVE | Noted: 2018-10-11

## 2020-11-30 PROCEDURE — 99203 OFFICE O/P NEW LOW 30 MIN: CPT | Performed by: PHYSICIAN ASSISTANT

## 2020-11-30 PROCEDURE — 73130 X-RAY EXAM OF HAND: CPT | Mod: TC,LT | Performed by: PHYSICIAN ASSISTANT

## 2020-11-30 ASSESSMENT — FIBROSIS 4 INDEX: FIB4 SCORE: 0.33

## 2020-11-30 NOTE — LETTER
November 30, 2020       Patient: Mariza Jordan   YOB: 1985   Date of Visit: 11/30/2020         To Whom It May Concern:    In my medical opinion, I recommend that Mariza Jordan may be excused from work for the dates of 12/1/20-12/2/20.      If you have any questions or concerns, please don't hesitate to call [phone]          Sincerely,          Preet Lechuga P.A.-C.  Electronically Signed
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
## 2021-01-30 ENCOUNTER — HOSPITAL ENCOUNTER (EMERGENCY)
Facility: MEDICAL CENTER | Age: 36
End: 2021-01-30
Attending: EMERGENCY MEDICINE

## 2021-01-30 ENCOUNTER — APPOINTMENT (OUTPATIENT)
Dept: RADIOLOGY | Facility: MEDICAL CENTER | Age: 36
End: 2021-01-30
Attending: EMERGENCY MEDICINE

## 2021-01-30 VITALS
HEART RATE: 61 BPM | SYSTOLIC BLOOD PRESSURE: 128 MMHG | OXYGEN SATURATION: 97 % | HEIGHT: 63 IN | WEIGHT: 257.72 LBS | DIASTOLIC BLOOD PRESSURE: 77 MMHG | TEMPERATURE: 97.4 F | RESPIRATION RATE: 16 BRPM | BODY MASS INDEX: 45.66 KG/M2

## 2021-01-30 DIAGNOSIS — K57.92 DIVERTICULITIS: ICD-10-CM

## 2021-01-30 LAB
ALBUMIN SERPL BCP-MCNC: 4.3 G/DL (ref 3.2–4.9)
ALBUMIN/GLOB SERPL: 1.4 G/DL
ALP SERPL-CCNC: 58 U/L (ref 30–99)
ALT SERPL-CCNC: 11 U/L (ref 2–50)
ANION GAP SERPL CALC-SCNC: 12 MMOL/L (ref 7–16)
APPEARANCE UR: CLEAR
AST SERPL-CCNC: 16 U/L (ref 12–45)
BASOPHILS # BLD AUTO: 0.3 % (ref 0–1.8)
BASOPHILS # BLD: 0.04 K/UL (ref 0–0.12)
BILIRUB SERPL-MCNC: 0.6 MG/DL (ref 0.1–1.5)
BILIRUB UR QL STRIP.AUTO: NEGATIVE
BUN SERPL-MCNC: 10 MG/DL (ref 8–22)
CALCIUM SERPL-MCNC: 9.2 MG/DL (ref 8.5–10.5)
CHLORIDE SERPL-SCNC: 101 MMOL/L (ref 96–112)
CO2 SERPL-SCNC: 23 MMOL/L (ref 20–33)
COLOR UR: YELLOW
CREAT SERPL-MCNC: 0.46 MG/DL (ref 0.5–1.4)
EOSINOPHIL # BLD AUTO: 0.11 K/UL (ref 0–0.51)
EOSINOPHIL NFR BLD: 0.8 % (ref 0–6.9)
ERYTHROCYTE [DISTWIDTH] IN BLOOD BY AUTOMATED COUNT: 43.3 FL (ref 35.9–50)
GLOBULIN SER CALC-MCNC: 3 G/DL (ref 1.9–3.5)
GLUCOSE SERPL-MCNC: 86 MG/DL (ref 65–99)
GLUCOSE UR STRIP.AUTO-MCNC: NEGATIVE MG/DL
HCG SERPL QL: NEGATIVE
HCT VFR BLD AUTO: 42.6 % (ref 37–47)
HGB BLD-MCNC: 14.2 G/DL (ref 12–16)
IMM GRANULOCYTES # BLD AUTO: 0.06 K/UL (ref 0–0.11)
IMM GRANULOCYTES NFR BLD AUTO: 0.5 % (ref 0–0.9)
KETONES UR STRIP.AUTO-MCNC: ABNORMAL MG/DL
LEUKOCYTE ESTERASE UR QL STRIP.AUTO: NEGATIVE
LIPASE SERPL-CCNC: 23 U/L (ref 11–82)
LYMPHOCYTES # BLD AUTO: 1.7 K/UL (ref 1–4.8)
LYMPHOCYTES NFR BLD: 13.1 % (ref 22–41)
MCH RBC QN AUTO: 29.8 PG (ref 27–33)
MCHC RBC AUTO-ENTMCNC: 33.3 G/DL (ref 33.6–35)
MCV RBC AUTO: 89.5 FL (ref 81.4–97.8)
MICRO URNS: ABNORMAL
MONOCYTES # BLD AUTO: 0.85 K/UL (ref 0–0.85)
MONOCYTES NFR BLD AUTO: 6.6 % (ref 0–13.4)
NEUTROPHILS # BLD AUTO: 10.21 K/UL (ref 2–7.15)
NEUTROPHILS NFR BLD: 78.7 % (ref 44–72)
NITRITE UR QL STRIP.AUTO: NEGATIVE
NRBC # BLD AUTO: 0 K/UL
NRBC BLD-RTO: 0 /100 WBC
PH UR STRIP.AUTO: 5 [PH] (ref 5–8)
PLATELET # BLD AUTO: 327 K/UL (ref 164–446)
PMV BLD AUTO: 9.2 FL (ref 9–12.9)
POTASSIUM SERPL-SCNC: 3.5 MMOL/L (ref 3.6–5.5)
PROT SERPL-MCNC: 7.3 G/DL (ref 6–8.2)
PROT UR QL STRIP: NEGATIVE MG/DL
RBC # BLD AUTO: 4.76 M/UL (ref 4.2–5.4)
RBC UR QL AUTO: NEGATIVE
SODIUM SERPL-SCNC: 136 MMOL/L (ref 135–145)
SP GR UR STRIP.AUTO: 1.03
UROBILINOGEN UR STRIP.AUTO-MCNC: 0.2 MG/DL
WBC # BLD AUTO: 13 K/UL (ref 4.8–10.8)

## 2021-01-30 PROCEDURE — 83690 ASSAY OF LIPASE: CPT

## 2021-01-30 PROCEDURE — 74177 CT ABD & PELVIS W/CONTRAST: CPT

## 2021-01-30 PROCEDURE — 81003 URINALYSIS AUTO W/O SCOPE: CPT

## 2021-01-30 PROCEDURE — 85025 COMPLETE CBC W/AUTO DIFF WBC: CPT

## 2021-01-30 PROCEDURE — 700102 HCHG RX REV CODE 250 W/ 637 OVERRIDE(OP): Performed by: EMERGENCY MEDICINE

## 2021-01-30 PROCEDURE — 700117 HCHG RX CONTRAST REV CODE 255: Performed by: EMERGENCY MEDICINE

## 2021-01-30 PROCEDURE — A9270 NON-COVERED ITEM OR SERVICE: HCPCS | Performed by: EMERGENCY MEDICINE

## 2021-01-30 PROCEDURE — 99284 EMERGENCY DEPT VISIT MOD MDM: CPT

## 2021-01-30 PROCEDURE — 80053 COMPREHEN METABOLIC PANEL: CPT

## 2021-01-30 PROCEDURE — 84703 CHORIONIC GONADOTROPIN ASSAY: CPT

## 2021-01-30 PROCEDURE — 36415 COLL VENOUS BLD VENIPUNCTURE: CPT

## 2021-01-30 RX ORDER — AMOXICILLIN AND CLAVULANATE POTASSIUM 875; 125 MG/1; MG/1
1 TABLET, FILM COATED ORAL 2 TIMES DAILY
Qty: 20 TAB | Refills: 0 | Status: SHIPPED | OUTPATIENT
Start: 2021-01-30 | End: 2021-02-09

## 2021-01-30 RX ORDER — AMOXICILLIN AND CLAVULANATE POTASSIUM 875; 125 MG/1; MG/1
1 TABLET, FILM COATED ORAL ONCE
Status: COMPLETED | OUTPATIENT
Start: 2021-01-30 | End: 2021-01-30

## 2021-01-30 RX ADMIN — IOHEXOL 100 ML: 300 INJECTION, SOLUTION INTRAVENOUS at 15:45

## 2021-01-30 RX ADMIN — AMOXICILLIN AND CLAVULANATE POTASSIUM 1 TABLET: 875; 125 TABLET, FILM COATED ORAL at 16:47

## 2021-01-30 ASSESSMENT — PAIN DESCRIPTION - PAIN TYPE: TYPE: ACUTE PAIN

## 2021-01-30 ASSESSMENT — FIBROSIS 4 INDEX: FIB4 SCORE: 0.33

## 2021-01-30 NOTE — ED TRIAGE NOTES
.Mariza Jordan  .  Chief Complaint   Patient presents with   • Abdominal Pain     x 4 days.    • Nausea     x 4 days     Patient to triage with above complaint. Patient report hx of diverticulitis. Patient also reports yellow-brown vaginal discharge this morning. Patient states she has an IUD in place though has not been able to feel the strings.  Patient to lobby and instructed to inform staff of any needs.

## 2021-01-30 NOTE — Clinical Note
Mariza Jordan was seen and treated in our emergency department on 1/30/2021.  She may return to work on 02/07/2021.       If you have any questions or concerns, please don't hesitate to call.      Issa Ruvalcaba M.D.

## 2021-01-31 NOTE — DISCHARGE INSTRUCTIONS
If you feel you are developing new or worsening symptoms return immediately for recheck.  Otherwise choose one of the primary care clinics and call Monday morning and arrange a primary care follow-up appointment during the week

## 2021-01-31 NOTE — ED NOTES
PIV removed. NAD. Discharge instructions reviewed and with pt when leaving. Pt ambulated to lobby to discharge home.

## 2021-06-10 ENCOUNTER — HOSPITAL ENCOUNTER (EMERGENCY)
Facility: MEDICAL CENTER | Age: 36
End: 2021-06-10
Attending: EMERGENCY MEDICINE

## 2021-06-10 ENCOUNTER — APPOINTMENT (OUTPATIENT)
Dept: RADIOLOGY | Facility: MEDICAL CENTER | Age: 36
End: 2021-06-10
Attending: EMERGENCY MEDICINE

## 2021-06-10 VITALS
WEIGHT: 257.5 LBS | DIASTOLIC BLOOD PRESSURE: 70 MMHG | SYSTOLIC BLOOD PRESSURE: 140 MMHG | RESPIRATION RATE: 18 BRPM | HEIGHT: 64 IN | HEART RATE: 74 BPM | TEMPERATURE: 98 F | OXYGEN SATURATION: 92 % | BODY MASS INDEX: 43.96 KG/M2

## 2021-06-10 DIAGNOSIS — A64 STD (FEMALE): ICD-10-CM

## 2021-06-10 DIAGNOSIS — K57.92 DIVERTICULITIS: ICD-10-CM

## 2021-06-10 DIAGNOSIS — R10.9 ABDOMINAL PAIN, UNSPECIFIED ABDOMINAL LOCATION: ICD-10-CM

## 2021-06-10 LAB
ALBUMIN SERPL BCP-MCNC: 4.2 G/DL (ref 3.2–4.9)
ALBUMIN/GLOB SERPL: 1.2 G/DL
ALP SERPL-CCNC: 69 U/L (ref 30–99)
ALT SERPL-CCNC: 17 U/L (ref 2–50)
ANION GAP SERPL CALC-SCNC: 14 MMOL/L (ref 7–16)
APPEARANCE UR: CLEAR
AST SERPL-CCNC: 13 U/L (ref 12–45)
BASOPHILS # BLD AUTO: 0.4 % (ref 0–1.8)
BASOPHILS # BLD: 0.05 K/UL (ref 0–0.12)
BILIRUB SERPL-MCNC: 0.4 MG/DL (ref 0.1–1.5)
BILIRUB UR QL STRIP.AUTO: NEGATIVE
BUN SERPL-MCNC: 13 MG/DL (ref 8–22)
CALCIUM SERPL-MCNC: 9.3 MG/DL (ref 8.5–10.5)
CHLORIDE SERPL-SCNC: 103 MMOL/L (ref 96–112)
CO2 SERPL-SCNC: 23 MMOL/L (ref 20–33)
COLOR UR: YELLOW
CREAT SERPL-MCNC: 0.59 MG/DL (ref 0.5–1.4)
EOSINOPHIL # BLD AUTO: 0.14 K/UL (ref 0–0.51)
EOSINOPHIL NFR BLD: 1.2 % (ref 0–6.9)
ERYTHROCYTE [DISTWIDTH] IN BLOOD BY AUTOMATED COUNT: 43.4 FL (ref 35.9–50)
GLOBULIN SER CALC-MCNC: 3.4 G/DL (ref 1.9–3.5)
GLUCOSE SERPL-MCNC: 98 MG/DL (ref 65–99)
GLUCOSE UR STRIP.AUTO-MCNC: NEGATIVE MG/DL
HCG SERPL QL: NEGATIVE
HCT VFR BLD AUTO: 43 % (ref 37–47)
HGB BLD-MCNC: 14.2 G/DL (ref 12–16)
IMM GRANULOCYTES # BLD AUTO: 0.05 K/UL (ref 0–0.11)
IMM GRANULOCYTES NFR BLD AUTO: 0.4 % (ref 0–0.9)
KETONES UR STRIP.AUTO-MCNC: NEGATIVE MG/DL
LEUKOCYTE ESTERASE UR QL STRIP.AUTO: NEGATIVE
LIPASE SERPL-CCNC: 29 U/L (ref 11–82)
LYMPHOCYTES # BLD AUTO: 2.86 K/UL (ref 1–4.8)
LYMPHOCYTES NFR BLD: 24.2 % (ref 22–41)
MCH RBC QN AUTO: 29.2 PG (ref 27–33)
MCHC RBC AUTO-ENTMCNC: 33 G/DL (ref 33.6–35)
MCV RBC AUTO: 88.5 FL (ref 81.4–97.8)
MICRO URNS: NORMAL
MONOCYTES # BLD AUTO: 0.73 K/UL (ref 0–0.85)
MONOCYTES NFR BLD AUTO: 6.2 % (ref 0–13.4)
NEUTROPHILS # BLD AUTO: 8.01 K/UL (ref 2–7.15)
NEUTROPHILS NFR BLD: 67.6 % (ref 44–72)
NITRITE UR QL STRIP.AUTO: NEGATIVE
NRBC # BLD AUTO: 0 K/UL
NRBC BLD-RTO: 0 /100 WBC
PH UR STRIP.AUTO: 5.5 [PH] (ref 5–8)
PLATELET # BLD AUTO: 375 K/UL (ref 164–446)
PMV BLD AUTO: 9.3 FL (ref 9–12.9)
POTASSIUM SERPL-SCNC: 4.1 MMOL/L (ref 3.6–5.5)
PROT SERPL-MCNC: 7.6 G/DL (ref 6–8.2)
PROT UR QL STRIP: NEGATIVE MG/DL
RBC # BLD AUTO: 4.86 M/UL (ref 4.2–5.4)
RBC UR QL AUTO: NEGATIVE
SODIUM SERPL-SCNC: 140 MMOL/L (ref 135–145)
SP GR UR STRIP.AUTO: 1.01
UROBILINOGEN UR STRIP.AUTO-MCNC: 0.2 MG/DL
WBC # BLD AUTO: 11.8 K/UL (ref 4.8–10.8)

## 2021-06-10 PROCEDURE — 83690 ASSAY OF LIPASE: CPT

## 2021-06-10 PROCEDURE — 700111 HCHG RX REV CODE 636 W/ 250 OVERRIDE (IP): Performed by: EMERGENCY MEDICINE

## 2021-06-10 PROCEDURE — 99284 EMERGENCY DEPT VISIT MOD MDM: CPT

## 2021-06-10 PROCEDURE — 81003 URINALYSIS AUTO W/O SCOPE: CPT

## 2021-06-10 PROCEDURE — 84703 CHORIONIC GONADOTROPIN ASSAY: CPT

## 2021-06-10 PROCEDURE — 700102 HCHG RX REV CODE 250 W/ 637 OVERRIDE(OP): Performed by: EMERGENCY MEDICINE

## 2021-06-10 PROCEDURE — 96372 THER/PROPH/DIAG INJ SC/IM: CPT

## 2021-06-10 PROCEDURE — 80053 COMPREHEN METABOLIC PANEL: CPT

## 2021-06-10 PROCEDURE — 85025 COMPLETE CBC W/AUTO DIFF WBC: CPT

## 2021-06-10 PROCEDURE — A9270 NON-COVERED ITEM OR SERVICE: HCPCS | Performed by: EMERGENCY MEDICINE

## 2021-06-10 RX ORDER — CEFTRIAXONE 500 MG/1
500 INJECTION, POWDER, FOR SOLUTION INTRAMUSCULAR; INTRAVENOUS ONCE
Status: COMPLETED | OUTPATIENT
Start: 2021-06-10 | End: 2021-06-10

## 2021-06-10 RX ORDER — AMOXICILLIN AND CLAVULANATE POTASSIUM 875; 125 MG/1; MG/1
1 TABLET, FILM COATED ORAL 2 TIMES DAILY
Qty: 14 TABLET | Refills: 0 | Status: SHIPPED | OUTPATIENT
Start: 2021-06-10 | End: 2021-06-17

## 2021-06-10 RX ORDER — AZITHROMYCIN 250 MG/1
1000 TABLET, FILM COATED ORAL ONCE
Status: COMPLETED | OUTPATIENT
Start: 2021-06-10 | End: 2021-06-10

## 2021-06-10 RX ADMIN — AZITHROMYCIN MONOHYDRATE 1000 MG: 250 TABLET ORAL at 02:08

## 2021-06-10 RX ADMIN — CEFTRIAXONE SODIUM 500 MG: 500 INJECTION, POWDER, FOR SOLUTION INTRAMUSCULAR; INTRAVENOUS at 02:09

## 2021-06-10 ASSESSMENT — PAIN DESCRIPTION - DESCRIPTORS: DESCRIPTORS: TENDER;STABBING

## 2021-06-10 ASSESSMENT — PAIN DESCRIPTION - PAIN TYPE
TYPE: ACUTE PAIN;CHRONIC PAIN
TYPE: ACUTE PAIN

## 2021-06-10 ASSESSMENT — FIBROSIS 4 INDEX: FIB4 SCORE: 0.5

## 2021-06-10 NOTE — ED TRIAGE NOTES
"Patient complains of upper abdominal pain that radiates into her upper back and LLQ x1 week. Patient has a history of diverticulitis and constipation. Patient has been constipated, did have a small bowel movement today but feels like she needs to go more. Flank pain with burning on urination. Patient has been nauseous, no vomiting. Pain is described as a \"feels like someone is slicing her inside.\" Patient states that she ate sesame chicken one week ago.      SIMI BP - 145/105  [place] BP - 144/97     /92   Pulse 86   Temp 36.7 °C (98.1 °F) (Temporal)   Resp 16   Ht 1.613 m (5' 3.5\")   Wt 117 kg (257 lb 8 oz)   SpO2 97%     "

## 2021-06-10 NOTE — ED PROVIDER NOTES
"ER Provider Note     Scribed for Manpreet Fisher M.D. by Kash Jaime. 6/10/2021, 12:57 AM.    Primary Care Provider: Jania Bobby M.D.  Means of Arrival: Walk-in   History obtained from: Patient  History limited by: None     CHIEF COMPLAINT  Chief Complaint   Patient presents with    Abdominal Pain       HPI  Mariza Jordan is a 35 y.o. female who presents to the Emergency Department for evaluation of acute abdominal pain onset a week prior to arrival. Over the last week she has been having upper abdominal pain. The pain radiates to her back and ribs and is very sharp \"like someone's slicing my insides.\" She endorses associated dysuria, urinary frequency, vaginal discharge, nausea, and constipation. She had a very small amount of diarrhea earlier today, but feels as if she is still constipated. No fevers or vomiting. She has a history of diverticulitis and recently had it in January of this year. She does not feel that she has fully healed from that even after taking her prescribed antibiotics. Her last menstrual period was about a year ago, though they are usually irregular. She has an IUD in place but she is unsure when she is supposed to have it replaced. She is concerned for an STI given her urinary symptoms. Abdominal surgical history includes Caesarian sections.    REVIEW OF SYSTEMS  See HPI for further details. All other systems are negative.     PAST MEDICAL HISTORY   has a past medical history of Breath shortness (2019), Diverticulosis, PCOS (polycystic ovarian syndrome), Psychiatric problem (2019), Sleep apnea (2019), and Snoring.    SURGICAL HISTORY   has a past surgical history that includes other abdominal surgery; gyn surgery (); other (); and colonoscopy (N/A, 2019).    SOCIAL HISTORY  Social History     Tobacco Use    Smoking status: Former Smoker     Packs/day: 0.15     Types: Cigarettes     Quit date: 6/10/2020     Years since quittin.0    " "Smokeless tobacco: Never Used   Vaping Use    Vaping Use: Every day    Substances: THC   Substance Use Topics    Alcohol use: Not Currently     Comment: 1 per week    Drug use: Yes     Types: Inhaled     Comment: marijuana/vape      Social History     Substance and Sexual Activity   Drug Use Yes    Types: Inhaled    Comment: marijuana/vape       FAMILY HISTORY  No family history pertinent.    CURRENT MEDICATIONS  Home Medications       Reviewed by Areli Wayne R.N. (Registered Nurse) on 06/10/21 at 0022  Med List Status: <None>     Medication Last Dose Status   levonorgestrel (MIRENA, 52 MG,) 20 MCG/24HR IUD  Active                    ALLERGIES  No Known Allergies    PHYSICAL EXAM  VITAL SIGNS: /92   Pulse 86   Temp 36.7 °C (98.1 °F) (Temporal)   Resp 16   Ht 1.613 m (5' 3.5\")   Wt 117 kg (257 lb 8 oz)   LMP  (LMP Unknown)   SpO2 97%   BMI 44.90 kg/m²      Constitutional: Alert in no apparent distress.  HENT: No signs of trauma, Bilateral external ears normal, Nose normal.   Eyes: Pupils are equal and reactive, Conjunctiva normal, Non-icteric.   Neck: Normal range of motion, No tenderness, Supple, No stridor.   Lymphatic: No lymphadenopathy noted.   Cardiovascular: Regular rate and rhythm, no palpable thrill  Thorax & Lungs: No respiratory distress,  No chest tenderness.   Abdomen: Obese, Bowel sounds normal, Soft, No tenderness, No masses, No pulsatile masses. No peritoneal signs.  Skin: Warm, Dry, No erythema, No rash.   Back: No bony tenderness, No CVA tenderness.   Extremities: Intact distal pulses, No edema, No tenderness, No cyanosis.  Musculoskeletal: Good range of motion in all major joints. No tenderness to palpation or major deformities noted.   Neurologic: Alert , Normal motor function, Normal sensory function, No focal deficits noted.   Psychiatric: Affect normal, Judgment normal, Mood normal.     DIAGNOSTIC STUDIES / PROCEDURES     LABS  Labs Reviewed   CBC WITH DIFFERENTIAL - " Abnormal; Notable for the following components:       Result Value    WBC 11.8 (*)     MCHC 33.0 (*)     Neutrophils (Absolute) 8.01 (*)     All other components within normal limits   COMP METABOLIC PANEL   LIPASE   HCG QUAL SERUM   URINALYSIS,CULTURE IF INDICATED    Narrative:     Indication for culture:->Patient WITHOUT an indwelling Boles  catheter in place with new onset of Dysuria, Frequency,  Urgency, and/or Suprapubic pain   CHLAMYDIA/GC PCR URINE OR SWAB   ESTIMATED GFR     All labs reviewed by me.    COURSE & MEDICAL DECISION MAKING  Pertinent Labs & Imaging studies reviewed. (See chart for details)    This is a 35 y.o. female that presents with abdominal pain.  She has a very nonconcerning abdominal exam with some mild left lower quadrant pain.  She says it feels very similar to her diverticulitis.  We discussed getting imaging and she has declined.  Believe this is reasonable.  I will evaluate her for pregnancy.  We will also evaluate her for STDs with chlamydia and gonorrhea of her urine we will evaluate her for urinary tract infection and then reassess..     12:57 AM - Patient seen and examined at bedside. Ordered CBC with differential, CMP, Lipase, HCG Qual Serum, Chlamydia/GC, and UA.      Patient was found to have mild leukocytosis of 11.  Lipase is negative.  Urinalysis is negative.  CMP shows no electrolyte derangements.  At this time we will treat her with antibiotics for her potential STD as well as a course of antibiotics for potential diverticulitis.  I told her that she must return if she has any worsening pain given the fact that we are not ruling out abscess at this time.  I believe this is very unlikely given her very benign abdominal exam.  Strict return precautions were given and follow-up was arranged.    FINAL IMPRESSION  1. Abdominal pain, unspecified abdominal location    2. Diverticulitis    3. STD (female)          I, Kash Jaime (Scribe), am scribing for, and in the presence  ofManpreet M.D..    Electronically signed by: Kash Jaime (Scribe), 6/10/2021    I, Manpreet Fisher M.D. personally performed the services described in this documentation, as scribed by Kash Jaime in my presence, and it is both accurate and complete.     The note accurately reflects work and decisions made by me.  Manpreet Fisher M.D.  6/10/2021  3:20 AM

## 2021-06-10 NOTE — ED NOTES
Labs collected and sent, IV established, pt A&Ox4, RA. Agree w/ triage noted, pt states having burning, itching, and pain when voiding. Also mentioned that she has been having pain during sex. NAD noted, given warm blankets. Medical student at bedside

## 2021-06-10 NOTE — ED NOTES
"Pt discharged home, pt A&Ox4, steady gait, RA. Pt educated on worsening symptoms and abx use, pt verbalized understanding. IV discontinued and gauze placed, pt in possession of belongings. Pt provided discharge education and information pertaining to medications and follow up appointments. Pt received copy of discharge instructions and verbalized understanding. /70   Pulse 74   Temp 36.7 °C (98 °F) (Temporal)   Resp 18   Ht 1.613 m (5' 3.5\")   Wt 117 kg (257 lb 8 oz)   LMP  (LMP Unknown)   SpO2 92%   BMI 44.90 kg/m²   "

## 2021-10-29 ENCOUNTER — HOSPITAL ENCOUNTER (EMERGENCY)
Facility: MEDICAL CENTER | Age: 36
End: 2021-10-29

## 2021-10-29 VITALS
TEMPERATURE: 97.6 F | WEIGHT: 267.86 LBS | HEIGHT: 63 IN | HEART RATE: 106 BPM | OXYGEN SATURATION: 92 % | RESPIRATION RATE: 16 BRPM | SYSTOLIC BLOOD PRESSURE: 164 MMHG | BODY MASS INDEX: 47.46 KG/M2 | DIASTOLIC BLOOD PRESSURE: 102 MMHG

## 2021-10-29 PROCEDURE — 302449 STATCHG TRIAGE ONLY (STATISTIC)

## 2021-10-29 ASSESSMENT — FIBROSIS 4 INDEX: FIB4 SCORE: 0.29

## 2021-10-30 NOTE — ED NOTES
PT approached triage staff and stated she did not want to wait any longer. PT advised to seek further medical care if conditions worsen or do not resolve. PT AMA form signed and PT dismissed.

## 2021-10-30 NOTE — ED TRIAGE NOTES
Mariza Sanchez Jordan  36 y.o. F  Chief Complaint   Patient presents with   • Nausea     x 3 weeks    • Pelvic Pain     x 1 week. Intermittent LLQ pain now radiating across the lower abdomen     Patient has an IUD but feels that she might be pregnant. Patient has taken multiple negative pregnancy tests. Patient has a history of a tubal pregnancy in 2011. Patient states she has an IUD at that time. Patient denies any vaginal bleeding.     Vitals:    10/29/21 2225   BP: (Abnormal) 164/102   Pulse: (Abnormal) 106   Resp: 16   Temp: 36.4 °C (97.6 °F)   SpO2: 92%     Triage process explained to patient, apologized for wait time, and returned to [facility].  Pt informed to notify staff of any change in condition.

## 2022-03-10 ENCOUNTER — HOSPITAL ENCOUNTER (EMERGENCY)
Facility: MEDICAL CENTER | Age: 37
End: 2022-03-10
Attending: EMERGENCY MEDICINE

## 2022-03-10 ENCOUNTER — APPOINTMENT (OUTPATIENT)
Dept: RADIOLOGY | Facility: MEDICAL CENTER | Age: 37
End: 2022-03-10
Attending: EMERGENCY MEDICINE

## 2022-03-10 VITALS
TEMPERATURE: 98.4 F | HEIGHT: 64 IN | OXYGEN SATURATION: 96 % | BODY MASS INDEX: 45.54 KG/M2 | WEIGHT: 266.76 LBS | HEART RATE: 76 BPM | RESPIRATION RATE: 16 BRPM | DIASTOLIC BLOOD PRESSURE: 81 MMHG | SYSTOLIC BLOOD PRESSURE: 142 MMHG

## 2022-03-10 DIAGNOSIS — R10.9 ABDOMINAL PAIN, UNSPECIFIED ABDOMINAL LOCATION: ICD-10-CM

## 2022-03-10 LAB
ALBUMIN SERPL BCP-MCNC: 4.3 G/DL (ref 3.2–4.9)
ALBUMIN/GLOB SERPL: 1.3 G/DL
ALP SERPL-CCNC: 63 U/L (ref 30–99)
ALT SERPL-CCNC: 15 U/L (ref 2–50)
ANION GAP SERPL CALC-SCNC: 12 MMOL/L (ref 7–16)
APPEARANCE UR: CLEAR
AST SERPL-CCNC: 25 U/L (ref 12–45)
BASOPHILS # BLD AUTO: 0.6 % (ref 0–1.8)
BASOPHILS # BLD: 0.06 K/UL (ref 0–0.12)
BILIRUB SERPL-MCNC: 0.5 MG/DL (ref 0.1–1.5)
BILIRUB UR QL STRIP.AUTO: NEGATIVE
BUN SERPL-MCNC: 16 MG/DL (ref 8–22)
CALCIUM SERPL-MCNC: 9.5 MG/DL (ref 8.5–10.5)
CHLORIDE SERPL-SCNC: 103 MMOL/L (ref 96–112)
CO2 SERPL-SCNC: 23 MMOL/L (ref 20–33)
COLOR UR: YELLOW
CREAT SERPL-MCNC: 0.59 MG/DL (ref 0.5–1.4)
EOSINOPHIL # BLD AUTO: 0.13 K/UL (ref 0–0.51)
EOSINOPHIL NFR BLD: 1.3 % (ref 0–6.9)
ERYTHROCYTE [DISTWIDTH] IN BLOOD BY AUTOMATED COUNT: 43 FL (ref 35.9–50)
GLOBULIN SER CALC-MCNC: 3.2 G/DL (ref 1.9–3.5)
GLUCOSE SERPL-MCNC: 103 MG/DL (ref 65–99)
GLUCOSE UR STRIP.AUTO-MCNC: NEGATIVE MG/DL
HCG SERPL QL: NEGATIVE
HCT VFR BLD AUTO: 44.5 % (ref 37–47)
HGB BLD-MCNC: 14.6 G/DL (ref 12–16)
IMM GRANULOCYTES # BLD AUTO: 0.05 K/UL (ref 0–0.11)
IMM GRANULOCYTES NFR BLD AUTO: 0.5 % (ref 0–0.9)
KETONES UR STRIP.AUTO-MCNC: ABNORMAL MG/DL
LEUKOCYTE ESTERASE UR QL STRIP.AUTO: NEGATIVE
LIPASE SERPL-CCNC: 33 U/L (ref 11–82)
LYMPHOCYTES # BLD AUTO: 1.84 K/UL (ref 1–4.8)
LYMPHOCYTES NFR BLD: 18.3 % (ref 22–41)
MCH RBC QN AUTO: 29.5 PG (ref 27–33)
MCHC RBC AUTO-ENTMCNC: 32.8 G/DL (ref 33.6–35)
MCV RBC AUTO: 89.9 FL (ref 81.4–97.8)
MICRO URNS: ABNORMAL
MONOCYTES # BLD AUTO: 0.56 K/UL (ref 0–0.85)
MONOCYTES NFR BLD AUTO: 5.6 % (ref 0–13.4)
NEUTROPHILS # BLD AUTO: 7.39 K/UL (ref 2–7.15)
NEUTROPHILS NFR BLD: 73.7 % (ref 44–72)
NITRITE UR QL STRIP.AUTO: NEGATIVE
NRBC # BLD AUTO: 0 K/UL
NRBC BLD-RTO: 0 /100 WBC
PH UR STRIP.AUTO: 6 [PH] (ref 5–8)
PLATELET # BLD AUTO: 357 K/UL (ref 164–446)
PMV BLD AUTO: 9 FL (ref 9–12.9)
POTASSIUM SERPL-SCNC: 4 MMOL/L (ref 3.6–5.5)
PROT SERPL-MCNC: 7.5 G/DL (ref 6–8.2)
PROT UR QL STRIP: NEGATIVE MG/DL
RBC # BLD AUTO: 4.95 M/UL (ref 4.2–5.4)
RBC UR QL AUTO: NEGATIVE
SODIUM SERPL-SCNC: 138 MMOL/L (ref 135–145)
SP GR UR STRIP.AUTO: 1.02
UROBILINOGEN UR STRIP.AUTO-MCNC: 0.2 MG/DL
WBC # BLD AUTO: 10 K/UL (ref 4.8–10.8)

## 2022-03-10 PROCEDURE — 84703 CHORIONIC GONADOTROPIN ASSAY: CPT

## 2022-03-10 PROCEDURE — 81003 URINALYSIS AUTO W/O SCOPE: CPT

## 2022-03-10 PROCEDURE — 83690 ASSAY OF LIPASE: CPT

## 2022-03-10 PROCEDURE — 80053 COMPREHEN METABOLIC PANEL: CPT

## 2022-03-10 PROCEDURE — 76856 US EXAM PELVIC COMPLETE: CPT

## 2022-03-10 PROCEDURE — 85025 COMPLETE CBC W/AUTO DIFF WBC: CPT

## 2022-03-10 PROCEDURE — 99284 EMERGENCY DEPT VISIT MOD MDM: CPT

## 2022-03-10 PROCEDURE — 36415 COLL VENOUS BLD VENIPUNCTURE: CPT

## 2022-03-10 ASSESSMENT — FIBROSIS 4 INDEX: FIB4 SCORE: 0.29

## 2022-03-10 NOTE — Clinical Note
Mariza Jordan was seen and treated in our emergency department on 3/10/2022.  She may return to work on 03/13/2022.       If you have any questions or concerns, please don't hesitate to call.      Kristian Swift D.O.

## 2022-03-10 NOTE — ED PROVIDER NOTES
"ED Provider Note    CHIEF COMPLAINT  Chief Complaint   Patient presents with   • Abdominal Pain     Pt reports she started spotting lightly two days ago. Pt states she currently has an IUD. Pt states she has a history of an ectopic pregnancy, she is concerned also because she had an abdominal injury at work recently.        HPI  Mariza Jrodan is a 36 y.o. female here for evaluation of lower abdominal pain.  Patient states she has also had some vaginal spotting over the last 2 days.  She states that \"this usually happens when I am pregnant.'  Patient has no fever chills or vomiting, she describes her abdominal pain is intermittent and lower, in addition to being mild.  Non radiating, no associated headache or neck pain,  She has no fever chills or vomiting.  She has no chest pain or shortness of breath. She has no dysuria, urgency or frequency.     ROS;  Please see HPI  O/W negative     PAST MEDICAL HISTORY   has a past medical history of Breath shortness (2019), Diverticulosis, PCOS (polycystic ovarian syndrome), Psychiatric problem (2019), Sleep apnea (2019), and Snoring.    SOCIAL HISTORY  Social History     Tobacco Use   • Smoking status: Former Smoker     Packs/day: 0.15     Types: Cigarettes     Quit date: 6/10/2020     Years since quittin.7   • Smokeless tobacco: Never Used   Vaping Use   • Vaping Use: Every day   • Substances: THC   Substance and Sexual Activity   • Alcohol use: Not Currently     Comment: 1 per week   • Drug use: Yes     Types: Inhaled     Comment: marijuana/vape   • Sexual activity: Not on file       SURGICAL HISTORY   has a past surgical history that includes other abdominal surgery; gyn surgery (); other (); and colonoscopy (N/A, 2019).    CURRENT MEDICATIONS  Home Medications     Reviewed by Marisol Royal R.N. (Registered Nurse) on 03/10/22 at 1055  Med List Status: <None>   Medication Last Dose Status   levonorgestrel (MIRENA, 52 MG,) 20 " "MCG/24HR IUD  Active                ALLERGIES  No Known Allergies    REVIEW OF SYSTEMS  See HPI for further details. Review of systems as above, otherwise all other systems are negative.     PHYSICAL EXAM  VITAL SIGNS: /87   Pulse 78   Temp 36.3 °C (97.4 °F) (Temporal)   Resp 18   Ht 1.613 m (5' 3.5\")   Wt 121 kg (266 lb 12.1 oz)   SpO2 95%   BMI 46.51 kg/m²     Constitutional: Well developed, well nourished. No acute distress.  HEENT: Normocephalic, atraumatic. MMM  Neck: Supple, Full range of motion   Chest/Pulmonary:  No respiratory distress.  Equal expansion   Abd;  Soft, non tender, no guarding. No p /s.   Musculoskeletal: No deformity, no edema, neurovascular intact.   Neuro: Clear speech, appropriate, cooperative, cranial nerves II-XII grossly intact.  Psych: Normal mood and affect      Results for orders placed or performed during the hospital encounter of 03/10/22   CBC WITH DIFFERENTIAL   Result Value Ref Range    WBC 10.0 4.8 - 10.8 K/uL    RBC 4.95 4.20 - 5.40 M/uL    Hemoglobin 14.6 12.0 - 16.0 g/dL    Hematocrit 44.5 37.0 - 47.0 %    MCV 89.9 81.4 - 97.8 fL    MCH 29.5 27.0 - 33.0 pg    MCHC 32.8 (L) 33.6 - 35.0 g/dL    RDW 43.0 35.9 - 50.0 fL    Platelet Count 357 164 - 446 K/uL    MPV 9.0 9.0 - 12.9 fL    Neutrophils-Polys 73.70 (H) 44.00 - 72.00 %    Lymphocytes 18.30 (L) 22.00 - 41.00 %    Monocytes 5.60 0.00 - 13.40 %    Eosinophils 1.30 0.00 - 6.90 %    Basophils 0.60 0.00 - 1.80 %    Immature Granulocytes 0.50 0.00 - 0.90 %    Nucleated RBC 0.00 /100 WBC    Neutrophils (Absolute) 7.39 (H) 2.00 - 7.15 K/uL    Lymphs (Absolute) 1.84 1.00 - 4.80 K/uL    Monos (Absolute) 0.56 0.00 - 0.85 K/uL    Eos (Absolute) 0.13 0.00 - 0.51 K/uL    Baso (Absolute) 0.06 0.00 - 0.12 K/uL    Immature Granulocytes (abs) 0.05 0.00 - 0.11 K/uL    NRBC (Absolute) 0.00 K/uL   COMP METABOLIC PANEL   Result Value Ref Range    Sodium 138 135 - 145 mmol/L    Potassium 4.0 3.6 - 5.5 mmol/L    Chloride 103 96 - " 112 mmol/L    Co2 23 20 - 33 mmol/L    Anion Gap 12.0 7.0 - 16.0    Glucose 103 (H) 65 - 99 mg/dL    Bun 16 8 - 22 mg/dL    Creatinine 0.59 0.50 - 1.40 mg/dL    Calcium 9.5 8.5 - 10.5 mg/dL    AST(SGOT) 25 12 - 45 U/L    ALT(SGPT) 15 2 - 50 U/L    Alkaline Phosphatase 63 30 - 99 U/L    Total Bilirubin 0.5 0.1 - 1.5 mg/dL    Albumin 4.3 3.2 - 4.9 g/dL    Total Protein 7.5 6.0 - 8.2 g/dL    Globulin 3.2 1.9 - 3.5 g/dL    A-G Ratio 1.3 g/dL   LIPASE   Result Value Ref Range    Lipase 33 11 - 82 U/L   HCG QUAL SERUM   Result Value Ref Range    Beta-Hcg Qualitative Serum Negative Negative   URINALYSIS,CULTURE IF INDICATED    Specimen: Urine, Clean Catch   Result Value Ref Range    Color Yellow     Character Clear     Specific Gravity 1.023 <1.035    Ph 6.0 5.0 - 8.0    Glucose Negative Negative mg/dL    Ketones Trace (A) Negative mg/dL    Protein Negative Negative mg/dL    Bilirubin Negative Negative    Urobilinogen, Urine 0.2 Negative    Nitrite Negative Negative    Leukocyte Esterase Negative Negative    Occult Blood Negative Negative    Micro Urine Req see below    ESTIMATED GFR   Result Value Ref Range    GFR If African American >60 >60 mL/min/1.73 m 2    GFR If Non African American >60 >60 mL/min/1.73 m 2     US-PELVIC COMPLETE (TRANSABDOMINAL/TRANSVAGINAL) (COMBO)   Final Result         1.  Small amount of endocervical fluid.   2.  IUD in place.   3.  Left ovary is unremarkable.   4.  Right ovary is not visualized, obscured by bowel gas.              PROCEDURES     MEDICAL RECORD  I have reviewed patient's medical record and pertinent results are listed.    COURSE & MEDICAL DECISION MAKING  I have reviewed any medical record information, laboratory studies and radiographic results as noted above.    I you have had any blood pressure issues while here in the emergency department, please see your doctor for a further evaluation or work up.    Differential diagnoses include but not limited to: abdominal pain, sbo,  constipation, constipation, perforation,      This patient presents with abdominal pain .  At this time, I have counseled the patient/family regarding their medications, pain control, and follow up.  They will continue their medications, if any, as prescribed.  They will return immediately for any worsening symptoms and/or any other medical concerns.  They will see their doctor, or contact the doctor provided, in 1-2 days for follow up.       FINAL IMPRESSION  1. Abdominal pain, unspecified abdominal location           Electronically signed by: Kristian Swift D.O., 3/10/2022 2:36 PM

## 2022-03-10 NOTE — ED TRIAGE NOTES
"Chief Complaint   Patient presents with   • Abdominal Pain     Pt reports she started spotting lightly two days ago. Pt states she currently has an IUD. Pt states she has a history of an ectopic pregnancy, she is concerned also because she had an abdominal injury at work recently.      /87   Pulse 78   Temp 36.3 °C (97.4 °F) (Temporal)   Resp 18   Ht 1.613 m (5' 3.5\")   Wt 121 kg (266 lb 12.1 oz)   SpO2 95%   BMI 46.51 kg/m²     Pt is ambulatory in and out of triage. Appropriate PPE worn throughout entire encounter. Pt placed back in the lobby and educated about triage process.    "

## 2022-06-02 ENCOUNTER — OFFICE VISIT (OUTPATIENT)
Dept: URGENT CARE | Facility: PHYSICIAN GROUP | Age: 37
End: 2022-06-02

## 2022-06-02 VITALS
HEIGHT: 64 IN | DIASTOLIC BLOOD PRESSURE: 62 MMHG | SYSTOLIC BLOOD PRESSURE: 104 MMHG | WEIGHT: 286 LBS | TEMPERATURE: 97.9 F | BODY MASS INDEX: 48.83 KG/M2 | RESPIRATION RATE: 20 BRPM | OXYGEN SATURATION: 95 % | HEART RATE: 91 BPM

## 2022-06-02 DIAGNOSIS — M54.2 ANTERIOR NECK PAIN: ICD-10-CM

## 2022-06-02 DIAGNOSIS — S19.80XA BLUNT TRAUMA OF NECK, INITIAL ENCOUNTER: ICD-10-CM

## 2022-06-02 PROCEDURE — 99213 OFFICE O/P EST LOW 20 MIN: CPT | Performed by: EMERGENCY MEDICINE

## 2022-06-02 ASSESSMENT — ENCOUNTER SYMPTOMS
COUGH: 0
SHORTNESS OF BREATH: 0
NECK PAIN: 1
TROUBLE SWALLOWING: 0

## 2022-06-02 ASSESSMENT — FIBROSIS 4 INDEX: FIB4 SCORE: 0.63
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
## 2022-06-15 PROCEDURE — 99283 EMERGENCY DEPT VISIT LOW MDM: CPT

## 2022-06-15 ASSESSMENT — FIBROSIS 4 INDEX: FIB4 SCORE: 0.63

## 2022-06-16 ENCOUNTER — APPOINTMENT (OUTPATIENT)
Dept: RADIOLOGY | Facility: MEDICAL CENTER | Age: 37
End: 2022-06-16
Attending: EMERGENCY MEDICINE

## 2022-06-16 ENCOUNTER — HOSPITAL ENCOUNTER (EMERGENCY)
Facility: MEDICAL CENTER | Age: 37
End: 2022-06-16
Attending: EMERGENCY MEDICINE

## 2022-06-16 VITALS
HEART RATE: 75 BPM | HEIGHT: 63 IN | BODY MASS INDEX: 48.59 KG/M2 | WEIGHT: 274.25 LBS | TEMPERATURE: 97.7 F | SYSTOLIC BLOOD PRESSURE: 115 MMHG | RESPIRATION RATE: 18 BRPM | DIASTOLIC BLOOD PRESSURE: 74 MMHG | OXYGEN SATURATION: 95 %

## 2022-06-16 DIAGNOSIS — S16.1XXA STRAIN OF NECK MUSCLE, INITIAL ENCOUNTER: ICD-10-CM

## 2022-06-16 LAB
ALBUMIN SERPL BCP-MCNC: 4.2 G/DL (ref 3.2–4.9)
ALBUMIN/GLOB SERPL: 1.4 G/DL
ALP SERPL-CCNC: 68 U/L (ref 30–99)
ALT SERPL-CCNC: 16 U/L (ref 2–50)
ANION GAP SERPL CALC-SCNC: 11 MMOL/L (ref 7–16)
AST SERPL-CCNC: 11 U/L (ref 12–45)
BASOPHILS # BLD AUTO: 0.6 % (ref 0–1.8)
BASOPHILS # BLD: 0.07 K/UL (ref 0–0.12)
BILIRUB SERPL-MCNC: 0.4 MG/DL (ref 0.1–1.5)
BUN SERPL-MCNC: 10 MG/DL (ref 8–22)
CALCIUM SERPL-MCNC: 9.3 MG/DL (ref 8.5–10.5)
CHLORIDE SERPL-SCNC: 102 MMOL/L (ref 96–112)
CO2 SERPL-SCNC: 24 MMOL/L (ref 20–33)
CREAT SERPL-MCNC: 0.54 MG/DL (ref 0.5–1.4)
EOSINOPHIL # BLD AUTO: 0.21 K/UL (ref 0–0.51)
EOSINOPHIL NFR BLD: 1.7 % (ref 0–6.9)
ERYTHROCYTE [DISTWIDTH] IN BLOOD BY AUTOMATED COUNT: 42.2 FL (ref 35.9–50)
GFR SERPLBLD CREATININE-BSD FMLA CKD-EPI: 122 ML/MIN/1.73 M 2
GLOBULIN SER CALC-MCNC: 3.1 G/DL (ref 1.9–3.5)
GLUCOSE SERPL-MCNC: 106 MG/DL (ref 65–99)
HCG SERPL QL: NEGATIVE
HCT VFR BLD AUTO: 43.2 % (ref 37–47)
HGB BLD-MCNC: 14.4 G/DL (ref 12–16)
IMM GRANULOCYTES # BLD AUTO: 0.08 K/UL (ref 0–0.11)
IMM GRANULOCYTES NFR BLD AUTO: 0.6 % (ref 0–0.9)
LYMPHOCYTES # BLD AUTO: 3.02 K/UL (ref 1–4.8)
LYMPHOCYTES NFR BLD: 24.2 % (ref 22–41)
MCH RBC QN AUTO: 29 PG (ref 27–33)
MCHC RBC AUTO-ENTMCNC: 33.3 G/DL (ref 33.6–35)
MCV RBC AUTO: 87.1 FL (ref 81.4–97.8)
MONOCYTES # BLD AUTO: 0.74 K/UL (ref 0–0.85)
MONOCYTES NFR BLD AUTO: 5.9 % (ref 0–13.4)
NEUTROPHILS # BLD AUTO: 8.38 K/UL (ref 2–7.15)
NEUTROPHILS NFR BLD: 67 % (ref 44–72)
NRBC # BLD AUTO: 0 K/UL
NRBC BLD-RTO: 0 /100 WBC
PLATELET # BLD AUTO: 401 K/UL (ref 164–446)
PMV BLD AUTO: 9 FL (ref 9–12.9)
POTASSIUM SERPL-SCNC: 4 MMOL/L (ref 3.6–5.5)
PROT SERPL-MCNC: 7.3 G/DL (ref 6–8.2)
RBC # BLD AUTO: 4.96 M/UL (ref 4.2–5.4)
SODIUM SERPL-SCNC: 137 MMOL/L (ref 135–145)
WBC # BLD AUTO: 12.5 K/UL (ref 4.8–10.8)

## 2022-06-16 PROCEDURE — 84703 CHORIONIC GONADOTROPIN ASSAY: CPT

## 2022-06-16 PROCEDURE — 70490 CT SOFT TISSUE NECK W/O DYE: CPT

## 2022-06-16 PROCEDURE — 36415 COLL VENOUS BLD VENIPUNCTURE: CPT

## 2022-06-16 PROCEDURE — 72125 CT NECK SPINE W/O DYE: CPT

## 2022-06-16 PROCEDURE — 85025 COMPLETE CBC W/AUTO DIFF WBC: CPT

## 2022-06-16 PROCEDURE — 80053 COMPREHEN METABOLIC PANEL: CPT

## 2022-06-16 RX ORDER — METHOCARBAMOL 500 MG/1
500 TABLET, FILM COATED ORAL EVERY 6 HOURS PRN
Qty: 20 TABLET | Refills: 0 | Status: SHIPPED | OUTPATIENT
Start: 2022-06-16 | End: 2022-08-04

## 2022-06-16 RX ORDER — IBUPROFEN 600 MG/1
600 TABLET ORAL EVERY 6 HOURS PRN
Qty: 20 TABLET | Refills: 0 | Status: SHIPPED | OUTPATIENT
Start: 2022-06-16 | End: 2022-08-04

## 2022-06-16 NOTE — ED NOTES
Pt is discharged. VSS. Paperwork explained and all questions answered. All belongings sent with pt upon departure. Pt ambulatory with a steady gait out of ED.

## 2022-06-16 NOTE — DISCHARGE INSTRUCTIONS
Follow-up with primary care 1 to 2 days for reevaluation, medication management and additional imaging/MRI or referral to specialist if symptoms persist.    Motrin 600 mg every 6 hours as needed for discomfort.  Robaxin every 6 hours as needed for pain or spasm.    Slow stretching and range of motion exercises encouraged.  Otherwise diet and activity as tolerated.    Return to the emergency department for persistent or worsening neck pain, difficulty breathing or swallowing, change in voice, extremity weakness or paresthesias, fever or other new concerns.
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
## 2022-08-04 ENCOUNTER — HOSPITAL ENCOUNTER (INPATIENT)
Facility: MEDICAL CENTER | Age: 37
LOS: 4 days | DRG: 392 | End: 2022-08-08
Attending: EMERGENCY MEDICINE | Admitting: FAMILY MEDICINE

## 2022-08-04 ENCOUNTER — APPOINTMENT (OUTPATIENT)
Dept: RADIOLOGY | Facility: MEDICAL CENTER | Age: 37
DRG: 392 | End: 2022-08-04
Attending: EMERGENCY MEDICINE

## 2022-08-04 DIAGNOSIS — F15.10 METHAMPHETAMINE USE (HCC): ICD-10-CM

## 2022-08-04 DIAGNOSIS — K57.20 DIVERTICULITIS OF COLON WITH PERFORATION: ICD-10-CM

## 2022-08-04 PROBLEM — D72.829 LEUKOCYTOSIS: Status: ACTIVE | Noted: 2022-08-04

## 2022-08-04 PROBLEM — E87.6 HYPOKALEMIA: Status: ACTIVE | Noted: 2022-08-04

## 2022-08-04 PROBLEM — E66.01 MORBID OBESITY WITH BMI OF 45.0-49.9, ADULT (HCC): Status: ACTIVE | Noted: 2018-03-22

## 2022-08-04 PROBLEM — K57.32 DIVERTICULITIS OF SIGMOID COLON: Status: ACTIVE | Noted: 2022-08-04

## 2022-08-04 LAB
ALBUMIN SERPL BCP-MCNC: 4.2 G/DL (ref 3.2–4.9)
ALBUMIN/GLOB SERPL: 1.4 G/DL
ALP SERPL-CCNC: 66 U/L (ref 30–99)
ALT SERPL-CCNC: 22 U/L (ref 2–50)
ANION GAP SERPL CALC-SCNC: 16 MMOL/L (ref 7–16)
APPEARANCE UR: CLEAR
AST SERPL-CCNC: 21 U/L (ref 12–45)
BACTERIA #/AREA URNS HPF: NEGATIVE /HPF
BASOPHILS # BLD AUTO: 0.3 % (ref 0–1.8)
BASOPHILS # BLD: 0.05 K/UL (ref 0–0.12)
BILIRUB SERPL-MCNC: 1.7 MG/DL (ref 0.1–1.5)
BILIRUB UR QL STRIP.AUTO: NEGATIVE
BUN SERPL-MCNC: 7 MG/DL (ref 8–22)
CALCIUM SERPL-MCNC: 9.1 MG/DL (ref 8.5–10.5)
CHLORIDE SERPL-SCNC: 106 MMOL/L (ref 96–112)
CO2 SERPL-SCNC: 17 MMOL/L (ref 20–33)
COLOR UR: ABNORMAL
CREAT SERPL-MCNC: 0.5 MG/DL (ref 0.5–1.4)
D DIMER PPP IA.FEU-MCNC: 2.04 UG/ML (FEU) (ref 0–0.5)
EKG IMPRESSION: NORMAL
EOSINOPHIL # BLD AUTO: 0.05 K/UL (ref 0–0.51)
EOSINOPHIL NFR BLD: 0.3 % (ref 0–6.9)
EPI CELLS #/AREA URNS HPF: NEGATIVE /HPF
ERYTHROCYTE [DISTWIDTH] IN BLOOD BY AUTOMATED COUNT: 40.4 FL (ref 35.9–50)
GFR SERPLBLD CREATININE-BSD FMLA CKD-EPI: 125 ML/MIN/1.73 M 2
GLOBULIN SER CALC-MCNC: 3.1 G/DL (ref 1.9–3.5)
GLUCOSE SERPL-MCNC: 111 MG/DL (ref 65–99)
GLUCOSE UR STRIP.AUTO-MCNC: NEGATIVE MG/DL
HCG SERPL QL: NEGATIVE
HCT VFR BLD AUTO: 40.6 % (ref 37–47)
HGB BLD-MCNC: 14.3 G/DL (ref 12–16)
HYALINE CASTS #/AREA URNS LPF: NORMAL /LPF
IMM GRANULOCYTES # BLD AUTO: 0.09 K/UL (ref 0–0.11)
IMM GRANULOCYTES NFR BLD AUTO: 0.5 % (ref 0–0.9)
KETONES UR STRIP.AUTO-MCNC: 15 MG/DL
LEUKOCYTE ESTERASE UR QL STRIP.AUTO: ABNORMAL
LIPASE SERPL-CCNC: 20 U/L (ref 11–82)
LYMPHOCYTES # BLD AUTO: 1.05 K/UL (ref 1–4.8)
LYMPHOCYTES NFR BLD: 6.3 % (ref 22–41)
MAGNESIUM SERPL-MCNC: 1.6 MG/DL (ref 1.5–2.5)
MCH RBC QN AUTO: 29.6 PG (ref 27–33)
MCHC RBC AUTO-ENTMCNC: 35.2 G/DL (ref 33.6–35)
MCV RBC AUTO: 84.1 FL (ref 81.4–97.8)
MICRO URNS: ABNORMAL
MONOCYTES # BLD AUTO: 0.69 K/UL (ref 0–0.85)
MONOCYTES NFR BLD AUTO: 4.2 % (ref 0–13.4)
NEUTROPHILS # BLD AUTO: 14.64 K/UL (ref 2–7.15)
NEUTROPHILS NFR BLD: 88.4 % (ref 44–72)
NITRITE UR QL STRIP.AUTO: NEGATIVE
NRBC # BLD AUTO: 0 K/UL
NRBC BLD-RTO: 0 /100 WBC
PH UR STRIP.AUTO: 6.5 [PH] (ref 5–8)
PLATELET # BLD AUTO: 320 K/UL (ref 164–446)
PMV BLD AUTO: 8.9 FL (ref 9–12.9)
POTASSIUM SERPL-SCNC: 3.2 MMOL/L (ref 3.6–5.5)
PROT SERPL-MCNC: 7.3 G/DL (ref 6–8.2)
PROT UR QL STRIP: NEGATIVE MG/DL
RBC # BLD AUTO: 4.83 M/UL (ref 4.2–5.4)
RBC # URNS HPF: NORMAL /HPF
RBC UR QL AUTO: NEGATIVE
SODIUM SERPL-SCNC: 139 MMOL/L (ref 135–145)
SP GR UR STRIP.AUTO: 1.01
UROBILINOGEN UR STRIP.AUTO-MCNC: 0.2 MG/DL
WBC # BLD AUTO: 16.6 K/UL (ref 4.8–10.8)
WBC #/AREA URNS HPF: NORMAL /HPF

## 2022-08-04 PROCEDURE — 84703 CHORIONIC GONADOTROPIN ASSAY: CPT

## 2022-08-04 PROCEDURE — 700111 HCHG RX REV CODE 636 W/ 250 OVERRIDE (IP): Performed by: STUDENT IN AN ORGANIZED HEALTH CARE EDUCATION/TRAINING PROGRAM

## 2022-08-04 PROCEDURE — 94760 N-INVAS EAR/PLS OXIMETRY 1: CPT

## 2022-08-04 PROCEDURE — 700105 HCHG RX REV CODE 258: Performed by: STUDENT IN AN ORGANIZED HEALTH CARE EDUCATION/TRAINING PROGRAM

## 2022-08-04 PROCEDURE — 81001 URINALYSIS AUTO W/SCOPE: CPT

## 2022-08-04 PROCEDURE — 96366 THER/PROPH/DIAG IV INF ADDON: CPT

## 2022-08-04 PROCEDURE — 85379 FIBRIN DEGRADATION QUANT: CPT

## 2022-08-04 PROCEDURE — 71045 X-RAY EXAM CHEST 1 VIEW: CPT

## 2022-08-04 PROCEDURE — 36415 COLL VENOUS BLD VENIPUNCTURE: CPT

## 2022-08-04 PROCEDURE — 700102 HCHG RX REV CODE 250 W/ 637 OVERRIDE(OP): Performed by: STUDENT IN AN ORGANIZED HEALTH CARE EDUCATION/TRAINING PROGRAM

## 2022-08-04 PROCEDURE — 99254 IP/OBS CNSLTJ NEW/EST MOD 60: CPT | Performed by: SURGERY

## 2022-08-04 PROCEDURE — 85025 COMPLETE CBC W/AUTO DIFF WBC: CPT

## 2022-08-04 PROCEDURE — 96365 THER/PROPH/DIAG IV INF INIT: CPT

## 2022-08-04 PROCEDURE — 71275 CT ANGIOGRAPHY CHEST: CPT

## 2022-08-04 PROCEDURE — 96375 TX/PRO/DX INJ NEW DRUG ADDON: CPT

## 2022-08-04 PROCEDURE — 700105 HCHG RX REV CODE 258: Performed by: EMERGENCY MEDICINE

## 2022-08-04 PROCEDURE — 99285 EMERGENCY DEPT VISIT HI MDM: CPT

## 2022-08-04 PROCEDURE — 96372 THER/PROPH/DIAG INJ SC/IM: CPT

## 2022-08-04 PROCEDURE — 99222 1ST HOSP IP/OBS MODERATE 55: CPT | Mod: GC | Performed by: FAMILY MEDICINE

## 2022-08-04 PROCEDURE — 770001 HCHG ROOM/CARE - MED/SURG/GYN PRIV*

## 2022-08-04 PROCEDURE — 700101 HCHG RX REV CODE 250: Performed by: EMERGENCY MEDICINE

## 2022-08-04 PROCEDURE — 83735 ASSAY OF MAGNESIUM: CPT

## 2022-08-04 PROCEDURE — 93005 ELECTROCARDIOGRAM TRACING: CPT | Performed by: EMERGENCY MEDICINE

## 2022-08-04 PROCEDURE — 700111 HCHG RX REV CODE 636 W/ 250 OVERRIDE (IP): Performed by: SURGERY

## 2022-08-04 PROCEDURE — 96367 TX/PROPH/DG ADDL SEQ IV INF: CPT

## 2022-08-04 PROCEDURE — 700117 HCHG RX CONTRAST REV CODE 255: Performed by: EMERGENCY MEDICINE

## 2022-08-04 PROCEDURE — 80053 COMPREHEN METABOLIC PANEL: CPT

## 2022-08-04 PROCEDURE — A9270 NON-COVERED ITEM OR SERVICE: HCPCS | Performed by: STUDENT IN AN ORGANIZED HEALTH CARE EDUCATION/TRAINING PROGRAM

## 2022-08-04 PROCEDURE — 83690 ASSAY OF LIPASE: CPT

## 2022-08-04 PROCEDURE — 700111 HCHG RX REV CODE 636 W/ 250 OVERRIDE (IP): Performed by: EMERGENCY MEDICINE

## 2022-08-04 PROCEDURE — 74177 CT ABD & PELVIS W/CONTRAST: CPT

## 2022-08-04 PROCEDURE — 96376 TX/PRO/DX INJ SAME DRUG ADON: CPT

## 2022-08-04 RX ORDER — ONDANSETRON 2 MG/ML
4 INJECTION INTRAMUSCULAR; INTRAVENOUS ONCE
Status: COMPLETED | OUTPATIENT
Start: 2022-08-04 | End: 2022-08-04

## 2022-08-04 RX ORDER — ONDANSETRON 4 MG/1
4 TABLET, ORALLY DISINTEGRATING ORAL EVERY 4 HOURS PRN
Status: DISCONTINUED | OUTPATIENT
Start: 2022-08-04 | End: 2022-08-08 | Stop reason: HOSPADM

## 2022-08-04 RX ORDER — POLYETHYLENE GLYCOL 3350 17 G/17G
1 POWDER, FOR SOLUTION ORAL
Status: DISCONTINUED | OUTPATIENT
Start: 2022-08-04 | End: 2022-08-05

## 2022-08-04 RX ORDER — PROMETHAZINE HYDROCHLORIDE 12.5 MG/1
12.5-25 SUPPOSITORY RECTAL EVERY 4 HOURS PRN
Status: DISCONTINUED | OUTPATIENT
Start: 2022-08-04 | End: 2022-08-08 | Stop reason: HOSPADM

## 2022-08-04 RX ORDER — ONDANSETRON 2 MG/ML
4 INJECTION INTRAMUSCULAR; INTRAVENOUS EVERY 4 HOURS PRN
Status: DISCONTINUED | OUTPATIENT
Start: 2022-08-04 | End: 2022-08-08 | Stop reason: HOSPADM

## 2022-08-04 RX ORDER — CEFTRIAXONE 2 G/1
2 INJECTION, POWDER, FOR SOLUTION INTRAMUSCULAR; INTRAVENOUS ONCE
Status: COMPLETED | OUTPATIENT
Start: 2022-08-04 | End: 2022-08-04

## 2022-08-04 RX ORDER — HYDROMORPHONE HYDROCHLORIDE 1 MG/ML
0.5 INJECTION, SOLUTION INTRAMUSCULAR; INTRAVENOUS; SUBCUTANEOUS ONCE
Status: COMPLETED | OUTPATIENT
Start: 2022-08-04 | End: 2022-08-04

## 2022-08-04 RX ORDER — HYDRALAZINE HYDROCHLORIDE 20 MG/ML
10 INJECTION INTRAMUSCULAR; INTRAVENOUS EVERY 4 HOURS PRN
Status: DISCONTINUED | OUTPATIENT
Start: 2022-08-04 | End: 2022-08-08 | Stop reason: HOSPADM

## 2022-08-04 RX ORDER — ENOXAPARIN SODIUM 100 MG/ML
40 INJECTION SUBCUTANEOUS 2 TIMES DAILY
Status: DISCONTINUED | OUTPATIENT
Start: 2022-08-04 | End: 2022-08-08 | Stop reason: HOSPADM

## 2022-08-04 RX ORDER — PROMETHAZINE HYDROCHLORIDE 25 MG/1
12.5-25 TABLET ORAL EVERY 4 HOURS PRN
Status: DISCONTINUED | OUTPATIENT
Start: 2022-08-04 | End: 2022-08-08 | Stop reason: HOSPADM

## 2022-08-04 RX ORDER — BISACODYL 10 MG
10 SUPPOSITORY, RECTAL RECTAL
Status: DISCONTINUED | OUTPATIENT
Start: 2022-08-04 | End: 2022-08-05

## 2022-08-04 RX ORDER — POTASSIUM CHLORIDE 20 MEQ/1
40 TABLET, EXTENDED RELEASE ORAL ONCE
Status: COMPLETED | OUTPATIENT
Start: 2022-08-04 | End: 2022-08-04

## 2022-08-04 RX ORDER — MORPHINE SULFATE 4 MG/ML
2 INJECTION INTRAVENOUS
Status: DISCONTINUED | OUTPATIENT
Start: 2022-08-04 | End: 2022-08-08 | Stop reason: HOSPADM

## 2022-08-04 RX ORDER — SODIUM CHLORIDE 9 MG/ML
INJECTION, SOLUTION INTRAVENOUS CONTINUOUS
Status: DISCONTINUED | OUTPATIENT
Start: 2022-08-04 | End: 2022-08-06

## 2022-08-04 RX ORDER — ACETAMINOPHEN 325 MG/1
650 TABLET ORAL EVERY 4 HOURS PRN
Status: DISCONTINUED | OUTPATIENT
Start: 2022-08-04 | End: 2022-08-08 | Stop reason: HOSPADM

## 2022-08-04 RX ORDER — AMOXICILLIN 250 MG
2 CAPSULE ORAL 2 TIMES DAILY
Status: DISCONTINUED | OUTPATIENT
Start: 2022-08-04 | End: 2022-08-05

## 2022-08-04 RX ORDER — METRONIDAZOLE 500 MG/100ML
500 INJECTION, SOLUTION INTRAVENOUS EVERY 6 HOURS
Status: COMPLETED | OUTPATIENT
Start: 2022-08-04 | End: 2022-08-04

## 2022-08-04 RX ORDER — MAGNESIUM SULFATE HEPTAHYDRATE 40 MG/ML
2 INJECTION, SOLUTION INTRAVENOUS EVERY 8 HOURS
Status: COMPLETED | OUTPATIENT
Start: 2022-08-04 | End: 2022-08-05

## 2022-08-04 RX ORDER — METRONIDAZOLE 500 MG/100ML
500 INJECTION, SOLUTION INTRAVENOUS EVERY 8 HOURS
Status: DISCONTINUED | OUTPATIENT
Start: 2022-08-05 | End: 2022-08-07

## 2022-08-04 RX ORDER — PROCHLORPERAZINE EDISYLATE 5 MG/ML
5-10 INJECTION INTRAMUSCULAR; INTRAVENOUS EVERY 4 HOURS PRN
Status: DISCONTINUED | OUTPATIENT
Start: 2022-08-04 | End: 2022-08-08 | Stop reason: HOSPADM

## 2022-08-04 RX ORDER — SODIUM CHLORIDE, SODIUM LACTATE, POTASSIUM CHLORIDE, CALCIUM CHLORIDE 600; 310; 30; 20 MG/100ML; MG/100ML; MG/100ML; MG/100ML
1000 INJECTION, SOLUTION INTRAVENOUS ONCE
Status: COMPLETED | OUTPATIENT
Start: 2022-08-04 | End: 2022-08-04

## 2022-08-04 RX ORDER — OMEPRAZOLE 20 MG/1
40 TABLET, DELAYED RELEASE ORAL
COMMUNITY

## 2022-08-04 RX ADMIN — HYDROMORPHONE HYDROCHLORIDE 0.5 MG: 1 INJECTION, SOLUTION INTRAMUSCULAR; INTRAVENOUS; SUBCUTANEOUS at 19:37

## 2022-08-04 RX ADMIN — PROCHLORPERAZINE EDISYLATE 5 MG: 5 INJECTION INTRAMUSCULAR; INTRAVENOUS at 23:17

## 2022-08-04 RX ADMIN — MORPHINE SULFATE 2 MG: 4 INJECTION INTRAVENOUS at 23:16

## 2022-08-04 RX ADMIN — ENOXAPARIN SODIUM 40 MG: 40 INJECTION SUBCUTANEOUS at 22:15

## 2022-08-04 RX ADMIN — SENNOSIDES AND DOCUSATE SODIUM 2 TABLET: 50; 8.6 TABLET ORAL at 22:16

## 2022-08-04 RX ADMIN — IOHEXOL 80 ML: 350 INJECTION, SOLUTION INTRAVENOUS at 19:05

## 2022-08-04 RX ADMIN — MAGNESIUM SULFATE HEPTAHYDRATE 2 G: 40 INJECTION, SOLUTION INTRAVENOUS at 22:30

## 2022-08-04 RX ADMIN — IOHEXOL 50 ML: 350 INJECTION, SOLUTION INTRAVENOUS at 21:00

## 2022-08-04 RX ADMIN — ONDANSETRON 4 MG: 2 INJECTION INTRAMUSCULAR; INTRAVENOUS at 19:42

## 2022-08-04 RX ADMIN — SODIUM CHLORIDE, POTASSIUM CHLORIDE, SODIUM LACTATE AND CALCIUM CHLORIDE 1000 ML: 600; 310; 30; 20 INJECTION, SOLUTION INTRAVENOUS at 17:43

## 2022-08-04 RX ADMIN — POTASSIUM CHLORIDE 40 MEQ: 1500 TABLET, EXTENDED RELEASE ORAL at 22:15

## 2022-08-04 RX ADMIN — CEFTRIAXONE SODIUM 2 G: 2 INJECTION, POWDER, FOR SOLUTION INTRAMUSCULAR; INTRAVENOUS at 20:12

## 2022-08-04 RX ADMIN — SODIUM CHLORIDE: 9 INJECTION, SOLUTION INTRAVENOUS at 23:16

## 2022-08-04 RX ADMIN — ONDANSETRON 4 MG: 2 INJECTION INTRAMUSCULAR; INTRAVENOUS at 17:43

## 2022-08-04 RX ADMIN — METRONIDAZOLE 500 MG: 5 INJECTION, SOLUTION INTRAVENOUS at 20:12

## 2022-08-04 ASSESSMENT — FIBROSIS 4 INDEX: FIB4 SCORE: 0.24

## 2022-08-05 PROBLEM — K62.5 RECTAL BLEEDING: Status: ACTIVE | Noted: 2022-08-05

## 2022-08-05 LAB
ALBUMIN SERPL BCP-MCNC: 3.9 G/DL (ref 3.2–4.9)
ALBUMIN/GLOB SERPL: 1.4 G/DL
ALP SERPL-CCNC: 64 U/L (ref 30–99)
ALT SERPL-CCNC: 16 U/L (ref 2–50)
ANION GAP SERPL CALC-SCNC: 13 MMOL/L (ref 7–16)
AST SERPL-CCNC: 17 U/L (ref 12–45)
BASOPHILS # BLD AUTO: 0.3 % (ref 0–1.8)
BASOPHILS # BLD: 0.04 K/UL (ref 0–0.12)
BILIRUB SERPL-MCNC: 1.1 MG/DL (ref 0.1–1.5)
BUN SERPL-MCNC: 6 MG/DL (ref 8–22)
CALCIUM SERPL-MCNC: 8.6 MG/DL (ref 8.5–10.5)
CHLORIDE SERPL-SCNC: 107 MMOL/L (ref 96–112)
CO2 SERPL-SCNC: 19 MMOL/L (ref 20–33)
CREAT SERPL-MCNC: 0.48 MG/DL (ref 0.5–1.4)
EOSINOPHIL # BLD AUTO: 0.1 K/UL (ref 0–0.51)
EOSINOPHIL NFR BLD: 0.8 % (ref 0–6.9)
ERYTHROCYTE [DISTWIDTH] IN BLOOD BY AUTOMATED COUNT: 41.6 FL (ref 35.9–50)
GFR SERPLBLD CREATININE-BSD FMLA CKD-EPI: 126 ML/MIN/1.73 M 2
GLOBULIN SER CALC-MCNC: 2.8 G/DL (ref 1.9–3.5)
GLUCOSE SERPL-MCNC: 99 MG/DL (ref 65–99)
HCT VFR BLD AUTO: 37.5 % (ref 37–47)
HGB BLD-MCNC: 12.9 G/DL (ref 12–16)
IMM GRANULOCYTES # BLD AUTO: 0.06 K/UL (ref 0–0.11)
IMM GRANULOCYTES NFR BLD AUTO: 0.5 % (ref 0–0.9)
LYMPHOCYTES # BLD AUTO: 2.27 K/UL (ref 1–4.8)
LYMPHOCYTES NFR BLD: 17.3 % (ref 22–41)
MCH RBC QN AUTO: 29.3 PG (ref 27–33)
MCHC RBC AUTO-ENTMCNC: 34.4 G/DL (ref 33.6–35)
MCV RBC AUTO: 85 FL (ref 81.4–97.8)
MONOCYTES # BLD AUTO: 0.78 K/UL (ref 0–0.85)
MONOCYTES NFR BLD AUTO: 5.9 % (ref 0–13.4)
NEUTROPHILS # BLD AUTO: 9.86 K/UL (ref 2–7.15)
NEUTROPHILS NFR BLD: 75.2 % (ref 44–72)
NRBC # BLD AUTO: 0 K/UL
NRBC BLD-RTO: 0 /100 WBC
PLATELET # BLD AUTO: 336 K/UL (ref 164–446)
PMV BLD AUTO: 9.2 FL (ref 9–12.9)
POTASSIUM SERPL-SCNC: 3.7 MMOL/L (ref 3.6–5.5)
PROT SERPL-MCNC: 6.7 G/DL (ref 6–8.2)
RBC # BLD AUTO: 4.41 M/UL (ref 4.2–5.4)
SODIUM SERPL-SCNC: 139 MMOL/L (ref 135–145)
WBC # BLD AUTO: 13.1 K/UL (ref 4.8–10.8)

## 2022-08-05 PROCEDURE — 85025 COMPLETE CBC W/AUTO DIFF WBC: CPT

## 2022-08-05 PROCEDURE — 700105 HCHG RX REV CODE 258: Performed by: STUDENT IN AN ORGANIZED HEALTH CARE EDUCATION/TRAINING PROGRAM

## 2022-08-05 PROCEDURE — 99232 SBSQ HOSP IP/OBS MODERATE 35: CPT | Mod: GC | Performed by: FAMILY MEDICINE

## 2022-08-05 PROCEDURE — 700101 HCHG RX REV CODE 250: Performed by: STUDENT IN AN ORGANIZED HEALTH CARE EDUCATION/TRAINING PROGRAM

## 2022-08-05 PROCEDURE — 700111 HCHG RX REV CODE 636 W/ 250 OVERRIDE (IP): Performed by: SURGERY

## 2022-08-05 PROCEDURE — A9270 NON-COVERED ITEM OR SERVICE: HCPCS | Performed by: STUDENT IN AN ORGANIZED HEALTH CARE EDUCATION/TRAINING PROGRAM

## 2022-08-05 PROCEDURE — 99232 SBSQ HOSP IP/OBS MODERATE 35: CPT | Performed by: SURGERY

## 2022-08-05 PROCEDURE — 80053 COMPREHEN METABOLIC PANEL: CPT

## 2022-08-05 PROCEDURE — 700102 HCHG RX REV CODE 250 W/ 637 OVERRIDE(OP): Performed by: STUDENT IN AN ORGANIZED HEALTH CARE EDUCATION/TRAINING PROGRAM

## 2022-08-05 PROCEDURE — 36415 COLL VENOUS BLD VENIPUNCTURE: CPT

## 2022-08-05 PROCEDURE — 700111 HCHG RX REV CODE 636 W/ 250 OVERRIDE (IP): Performed by: STUDENT IN AN ORGANIZED HEALTH CARE EDUCATION/TRAINING PROGRAM

## 2022-08-05 PROCEDURE — 770001 HCHG ROOM/CARE - MED/SURG/GYN PRIV*

## 2022-08-05 RX ADMIN — MAGNESIUM SULFATE HEPTAHYDRATE 2 G: 40 INJECTION, SOLUTION INTRAVENOUS at 06:32

## 2022-08-05 RX ADMIN — ENOXAPARIN SODIUM 40 MG: 40 INJECTION SUBCUTANEOUS at 05:08

## 2022-08-05 RX ADMIN — METRONIDAZOLE 500 MG: 500 INJECTION, SOLUTION INTRAVENOUS at 05:10

## 2022-08-05 RX ADMIN — SENNOSIDES AND DOCUSATE SODIUM 2 TABLET: 50; 8.6 TABLET ORAL at 05:08

## 2022-08-05 RX ADMIN — CEFTRIAXONE SODIUM 2 G: 10 INJECTION, POWDER, FOR SOLUTION INTRAVENOUS at 20:00

## 2022-08-05 RX ADMIN — MORPHINE SULFATE 2 MG: 4 INJECTION INTRAVENOUS at 05:37

## 2022-08-05 RX ADMIN — MORPHINE SULFATE 2 MG: 4 INJECTION INTRAVENOUS at 17:37

## 2022-08-05 RX ADMIN — METRONIDAZOLE 500 MG: 500 INJECTION, SOLUTION INTRAVENOUS at 17:36

## 2022-08-05 RX ADMIN — METRONIDAZOLE 500 MG: 500 INJECTION, SOLUTION INTRAVENOUS at 22:55

## 2022-08-05 RX ADMIN — MORPHINE SULFATE 2 MG: 4 INJECTION INTRAVENOUS at 11:56

## 2022-08-05 RX ADMIN — ENOXAPARIN SODIUM 40 MG: 40 INJECTION SUBCUTANEOUS at 17:36

## 2022-08-05 RX ADMIN — MORPHINE SULFATE 2 MG: 4 INJECTION INTRAVENOUS at 20:46

## 2022-08-05 RX ADMIN — SODIUM CHLORIDE: 9 INJECTION, SOLUTION INTRAVENOUS at 22:00

## 2022-08-05 RX ADMIN — PROCHLORPERAZINE EDISYLATE 10 MG: 5 INJECTION INTRAMUSCULAR; INTRAVENOUS at 07:38

## 2022-08-05 ASSESSMENT — PATIENT HEALTH QUESTIONNAIRE - PHQ9
2. FEELING DOWN, DEPRESSED, IRRITABLE, OR HOPELESS: SEVERAL DAYS
1. LITTLE INTEREST OR PLEASURE IN DOING THINGS: SEVERAL DAYS
5. POOR APPETITE OR OVEREATING: NOT AT ALL
8. MOVING OR SPEAKING SO SLOWLY THAT OTHER PEOPLE COULD HAVE NOTICED. OR THE OPPOSITE, BEING SO FIGETY OR RESTLESS THAT YOU HAVE BEEN MOVING AROUND A LOT MORE THAN USUAL: NOT AT ALL
9. THOUGHTS THAT YOU WOULD BE BETTER OFF DEAD, OR OF HURTING YOURSELF: NOT AT ALL
SUM OF ALL RESPONSES TO PHQ9 QUESTIONS 1 AND 2: 2
SUM OF ALL RESPONSES TO PHQ QUESTIONS 1-9: 4
3. TROUBLE FALLING OR STAYING ASLEEP OR SLEEPING TOO MUCH: SEVERAL DAYS
4. FEELING TIRED OR HAVING LITTLE ENERGY: SEVERAL DAYS
7. TROUBLE CONCENTRATING ON THINGS, SUCH AS READING THE NEWSPAPER OR WATCHING TELEVISION: NOT AT ALL
6. FEELING BAD ABOUT YOURSELF - OR THAT YOU ARE A FAILURE OR HAVE LET YOURSELF OR YOUR FAMILY DOWN: NOT AL ALL

## 2022-08-05 ASSESSMENT — LIFESTYLE VARIABLES
AVERAGE NUMBER OF DAYS PER WEEK YOU HAVE A DRINK CONTAINING ALCOHOL: 0
ON A TYPICAL DAY WHEN YOU DRINK ALCOHOL HOW MANY DRINKS DO YOU HAVE: 0
EVER FELT BAD OR GUILTY ABOUT YOUR DRINKING: NO
EVER HAD A DRINK FIRST THING IN THE MORNING TO STEADY YOUR NERVES TO GET RID OF A HANGOVER: NO
HOW MANY TIMES IN THE PAST YEAR HAVE YOU HAD 5 OR MORE DRINKS IN A DAY: 0
CONSUMPTION TOTAL: NEGATIVE
TOTAL SCORE: 0
HAVE YOU EVER FELT YOU SHOULD CUT DOWN ON YOUR DRINKING: NO
ALCOHOL_USE: NO
TOTAL SCORE: 0
TOTAL SCORE: 0
HAVE PEOPLE ANNOYED YOU BY CRITICIZING YOUR DRINKING: NO

## 2022-08-05 ASSESSMENT — COGNITIVE AND FUNCTIONAL STATUS - GENERAL
MOBILITY SCORE: 24
DAILY ACTIVITIY SCORE: 24
SUGGESTED CMS G CODE MODIFIER DAILY ACTIVITY: CH
SUGGESTED CMS G CODE MODIFIER MOBILITY: CH

## 2022-08-05 ASSESSMENT — PAIN DESCRIPTION - PAIN TYPE: TYPE: ACUTE PAIN

## 2022-08-05 NOTE — CONSULTS
DATE OF CONSULTATION:  8/4/2022     REFERRING PHYSICIAN:   Hallie Cedillo M.D.     CONSULTING PHYSICIAN:  Jarrett Nicholson M.D.     REASON FOR CONSULTATION:  I have been asked by  to see the patient in surgical consultation for evaluation of diverticulitis with microperforation.    HISTORY OF PRESENT ILLNESS: The patient is a 36 year-old  woman who presents to the Emergency Department with a 1- day history of moderate generalized abdominal pain but the pain is greatest in the lower abdomen specially midline.  The pain is associated with nausea, vomiting, anorexia, abdominal distension and shortness of breath. The patient denies any recent or intercurrent illness. The patient has undergone gynecologic surgery. The patient denies any previous surgery for obstructive symptoms.     Patient has a history of diverticulitis last episode approximately 3 years ago.  There is by far the worst episode she has had.    PAST MEDICAL HISTORY:  has a past medical history of Breath shortness (01/03/2019), Diverticulosis, PCOS (polycystic ovarian syndrome), Psychiatric problem (01/03/2019), Sleep apnea (01/03/2019), and Snoring.    PAST SURGICAL HISTORY:  has a past surgical history that includes other abdominal surgery; gyn surgery (2007/2009); other (2012); and colonoscopy (N/A, 1/9/2019).    ALLERGIES: No Known Allergies    CURRENT MEDICATIONS:    Home Medications     Reviewed by Dina Coello (Pharmacy Tech) on 08/04/22 at 1930  Med List Status: Complete   Medication Last Dose Status   levonorgestrel (MIRENA) 52 mg (20 mcg/24 hr) IUD IN PLACE Active   omeprazole (PRILOSEC OTC) 20 MG tablet 8/4/2022 Active                FAMILY HISTORY: family history is not on file.    SOCIAL HISTORY:  reports that she quit smoking about 2 years ago. Her smoking use included cigarettes. She smoked 0.15 packs per day. She has never used smokeless tobacco. She reports previous alcohol use. She reports current drug use.  Drug: Inhaled.    REVIEW OF SYSTEMS: Comprehensive review of systems is negative with the exception of the aforementioned HPI, PMH, and PSH bullets in accordance with CMS guidelines.    PHYSICAL EXAMINATION:    Physical Exam  Vitals and nursing note reviewed.   Constitutional:       General: She is awake.      Appearance: She is obese.   HENT:      Head: Normocephalic and atraumatic.      Right Ear: External ear normal.      Left Ear: External ear normal.      Nose: Nose normal.      Mouth/Throat:      Mouth: Mucous membranes are moist.   Eyes:      General: No scleral icterus.     Pupils: Pupils are equal, round, and reactive to light.   Cardiovascular:      Rate and Rhythm: Regular rhythm.      Pulses: Normal pulses.   Pulmonary:      Effort: Pulmonary effort is normal. No respiratory distress.      Breath sounds: Normal breath sounds.   Chest:      Chest wall: No tenderness.   Abdominal:      General: There is no distension.      Palpations: There is no mass.      Tenderness: There is abdominal tenderness. There is guarding. There is no rebound.      Hernia: No hernia is present.   Genitourinary:     Comments: voiding  Musculoskeletal:         General: No swelling, tenderness or signs of injury. Normal range of motion.      Cervical back: Normal range of motion and neck supple.   Skin:     General: Skin is warm and dry.      Capillary Refill: Capillary refill takes less than 2 seconds.   Neurological:      General: No focal deficit present.      Mental Status: She is alert and oriented to person, place, and time.   Psychiatric:         Behavior: Behavior is cooperative.         LABORATORY VALUES:   Recent Labs     08/04/22  1723   WBC 16.6*   RBC 4.83   HEMOGLOBIN 14.3   HEMATOCRIT 40.6   MCV 84.1   MCH 29.6   MCHC 35.2*   RDW 40.4   PLATELETCT 320   MPV 8.9*     Recent Labs     08/04/22  1723   SODIUM 139   POTASSIUM 3.2*   CHLORIDE 106   CO2 17*   GLUCOSE 111*   BUN 7*   CREATININE 0.50   CALCIUM 9.1     Recent  Labs     08/04/22  1723   ASTSGOT 21   ALTSGPT 22   TBILIRUBIN 1.7*   ALKPHOSPHAT 66   GLOBULIN 3.1            IMAGING:   CT-ABDOMEN-PELVIS WITH   Final Result      1.  Sigmoid diverticulitis with tiny foci of gas within the adjacent mesentery consistent with a tiny focal contained microperforation.      2.  Fatty liver.      3.  IUD present within the endometrial cavity.      4.  This was discussed with AMIRA FINNEGAN at 7:13 PM on 8/4/2022.      DX-CHEST-PORTABLE (1 VIEW)   Final Result      No evidence of acute cardiopulmonary process.      CT-CTA CHEST PULMONARY ARTERY W/ RECONS    (Results Pending)       ASSESSMENT AND PLAN:     Leukocytosis- (present on admission)  Assessment & Plan  WBC 16.6  Ceftriaxone / Flagyl for diverticulitis  Follow    Diverticulitis of colon with perforation- (present on admission)  Assessment & Plan  Abdominal pain x 1day  CT shows:  there is sigmoid diverticulitis characterized by bowel wall thickening with inflammatory change involving the bowel wall and the surrounding mesenteric fat. There are tiny foci of gas within the adjacent mesentery consistent with microperforation. There is sigmoid diverticulosis.  Non operative management  NPO / IV abx    Hypokalemia- (present on admission)  Assessment & Plan  K 3.2  K low - replace and follow  Check Mg    Morbid obesity with BMI of 45.0-49.9, adult (HCC)- (present on admission)  Assessment & Plan  8/4 BMI 47.47           ____________________________________     Jarrett Nicholson M.D.    DD: 8/4/2022  7:38 PM    ACS NSQIP Surgical Risk Calculator   see chief complaint quote

## 2022-08-05 NOTE — ED PROVIDER NOTES
ED Provider Note    Scribed for Hallie Cedillo M.D. by Glenna Gill. 8/4/2022  5:22 PM    Primary care provider: Jania Bobby M.D.  Means of arrival: Ambulance  History obtained from: Patient  History limited by: None    CHIEF COMPLAINT  Chief Complaint   Patient presents with   • Abdominal Pain     Beginning yesterday, associated with N/V   • Drug Ingestion     Pt states she was at a party on Sat/Sun night and smoked a blunt laced with methamphetamines. Pt has since felt unwell.        HPI  Mariza Jordan is a 36 y.o. female with sleep apnea who presents to the Emergency Department for evaluation of diffuse abdominal pain onset yesterday. Patient was at a party on Saturday. She states that a male offered her to smoke, and she agreed. Patient thought she was smoking marijuana, but she found out after smoking that it had methamphetamines. She has tried methamphetamines years ago, but notes that she did not feel like how does she now. Since the, she has had chest pain and shortness of breath. Patient speculates that her abdominal pain may be due to diverticulitis.  She has had diverticulitis in the past and this feels similar.  Is unsure why she feels short of breath however and is attributing it to the methamphetamine exposure.  She admits to associated symptoms of bloating, nausea, vomiting, and shortness of breath, but denies fever or swelling in legs. No alleviating factors were reported. Patient last ate a cracker and a small amount of cereal today. There is no chance she is pregnant.    REVIEW OF SYSTEMS  HEENT:  No ear pain, congestion, or sore throat    PULMONARY: no dyspnea, cough, or congestion. No shortness of breath.   GI: no diarrhea. Abdominal pain. Nausea. Vomiting. Bloating  : no dysuria, back pain, or hematuria   Musculoskeletal: no swelling, deformity, pain, or joint swelling  Endocrine: no fevers, sweating, or weight loss     See history of present illness. All other systems are  "negative. C.    PAST MEDICAL HISTORY   has a past medical history of Breath shortness (2019), Diverticulosis, PCOS (polycystic ovarian syndrome), Psychiatric problem (2019), Sleep apnea (2019), and Snoring.    SURGICAL HISTORY   has a past surgical history that includes other abdominal surgery; gyn surgery (); other (); and colonoscopy (N/A, 2019).    SOCIAL HISTORY  Social History     Tobacco Use   • Smoking status: Former Smoker     Packs/day: 0.15     Types: Cigarettes     Quit date: 6/10/2020     Years since quittin.1   • Smokeless tobacco: Never Used   Vaping Use   • Vaping Use: Every day   • Substances: THC   Substance Use Topics   • Alcohol use: Not Currently     Comment: 1 per week   • Drug use: Yes     Types: Inhaled     Comment: marijuana/vape + occ meth      Social History     Substance and Sexual Activity   Drug Use Yes   • Types: Inhaled    Comment: marijuana/vape + occ meth       FAMILY HISTORY  History reviewed. No pertinent family history.    CURRENT MEDICATIONS  Current Outpatient Medications:   •  methocarbamol (ROBAXIN) 500 MG Tab, Take 1 Tablet by mouth every 6 hours as needed (pain or spasm)., Disp: 20 Tablet, Rfl: 0  •  ibuprofen (MOTRIN) 600 MG Tab, Take 1 Tablet by mouth every 6 hours as needed for Mild Pain or Moderate Pain., Disp: 20 Tablet, Rfl: 0  •  levonorgestrel (MIRENA) 52 mg (20 mcg/24 hr) IUD, 1 Each by Intrauterine route Once., Disp: , Rfl:     ALLERGIES  No Known Allergies    PHYSICAL EXAM  VITAL SIGNS: /83   Pulse 92   Temp 36.3 °C (97.3 °F) (Temporal)   Resp (!) 22   Ht 1.6 m (5' 3\")   Wt 122 kg (268 lb)   SpO2 97%   BMI 47.47 kg/m²     Constitutional: Well developed, Well nourished, No acute distress, Non-toxic appearance.   HEENT: Normocephalic, Atraumatic,  external ears normal, pharynx pink,  Mucous  Membranes moist, No rhinorrhea or mucosal edema  Eyes: PERRL, EOMI, Conjunctiva normal, No discharge.   Neck: Normal range of " motion, No tenderness, Supple, No stridor.   Lymphatic: No lymphadenopathy    Cardiovascular: Regular Rate and Rhythm, No murmurs,  rubs, or gallops.   Thorax & Lungs: Lungs clear to auscultation bilaterally, No respiratory distress, No wheezes, rhales or rhonchi, No chest wall tenderness.   Abdomen: Diffuse abdominal tenderness and bloating. Bowel sounds normal, Soft, non distended,  No pulsatile masses., no rebound guarding or peritoneal signs.   Skin: Warm, Dry, No erythema, No rash,   Back:  No CVA tenderness,  No spinal tenderness, bony crepitance, step offs, or instability.   Neurologic: Alert & oriented clear speech no focal deficits  Extremities: Equal, intact distal pulses, No cyanosis, clubbing or edema,  No tenderness.   Musculoskeletal: Good range of motion in all major joints. No tenderness to palpation or major deformities noted.       DIAGNOSTIC STUDIES / PROCEDURES    LABS  Results for orders placed or performed during the hospital encounter of 08/04/22   HCG Qual Serum   Result Value Ref Range    Beta-Hcg Qualitative Serum Negative Negative   CBC WITH DIFFERENTIAL   Result Value Ref Range    WBC 16.6 (H) 4.8 - 10.8 K/uL    RBC 4.83 4.20 - 5.40 M/uL    Hemoglobin 14.3 12.0 - 16.0 g/dL    Hematocrit 40.6 37.0 - 47.0 %    MCV 84.1 81.4 - 97.8 fL    MCH 29.6 27.0 - 33.0 pg    MCHC 35.2 (H) 33.6 - 35.0 g/dL    RDW 40.4 35.9 - 50.0 fL    Platelet Count 320 164 - 446 K/uL    MPV 8.9 (L) 9.0 - 12.9 fL    Neutrophils-Polys 88.40 (H) 44.00 - 72.00 %    Lymphocytes 6.30 (L) 22.00 - 41.00 %    Monocytes 4.20 0.00 - 13.40 %    Eosinophils 0.30 0.00 - 6.90 %    Basophils 0.30 0.00 - 1.80 %    Immature Granulocytes 0.50 0.00 - 0.90 %    Nucleated RBC 0.00 /100 WBC    Neutrophils (Absolute) 14.64 (H) 2.00 - 7.15 K/uL    Lymphs (Absolute) 1.05 1.00 - 4.80 K/uL    Monos (Absolute) 0.69 0.00 - 0.85 K/uL    Eos (Absolute) 0.05 0.00 - 0.51 K/uL    Baso (Absolute) 0.05 0.00 - 0.12 K/uL    Immature Granulocytes (abs) 0.09  0.00 - 0.11 K/uL    NRBC (Absolute) 0.00 K/uL   COMP METABOLIC PANEL   Result Value Ref Range    Sodium 139 135 - 145 mmol/L    Potassium 3.2 (L) 3.6 - 5.5 mmol/L    Chloride 106 96 - 112 mmol/L    Co2 17 (L) 20 - 33 mmol/L    Anion Gap 16.0 7.0 - 16.0    Glucose 111 (H) 65 - 99 mg/dL    Bun 7 (L) 8 - 22 mg/dL    Creatinine 0.50 0.50 - 1.40 mg/dL    Calcium 9.1 8.5 - 10.5 mg/dL    AST(SGOT) 21 12 - 45 U/L    ALT(SGPT) 22 2 - 50 U/L    Alkaline Phosphatase 66 30 - 99 U/L    Total Bilirubin 1.7 (H) 0.1 - 1.5 mg/dL    Albumin 4.2 3.2 - 4.9 g/dL    Total Protein 7.3 6.0 - 8.2 g/dL    Globulin 3.1 1.9 - 3.5 g/dL    A-G Ratio 1.4 g/dL   LIPASE   Result Value Ref Range    Lipase 20 11 - 82 U/L   URINALYSIS CULTURE, IF INDICATED    Specimen: Urine, Clean Catch   Result Value Ref Range    Color Orange (A)     Character Clear     Specific Gravity 1.006 <1.035    Ph 6.5 5.0 - 8.0    Glucose Negative Negative mg/dL    Ketones 15 (A) Negative mg/dL    Protein Negative Negative mg/dL    Bilirubin Negative Negative    Urobilinogen, Urine 0.2 Negative    Nitrite Negative Negative    Leukocyte Esterase Small (A) Negative    Occult Blood Negative Negative    Micro Urine Req Microscopic    D-DIMER   Result Value Ref Range    D-Dimer Screen 2.04 (H) 0.00 - 0.50 ug/mL (FEU)   URINE MICROSCOPIC (W/UA)   Result Value Ref Range    WBC 2-5 /hpf    RBC 0-2 /hpf    Bacteria Negative None /hpf    Epithelial Cells Negative /hpf    Hyaline Cast 0-2 /lpf   ESTIMATED GFR   Result Value Ref Range    GFR (CKD-EPI) 125 >60 mL/min/1.73 m 2   MAGNESIUM   Result Value Ref Range    Magnesium 1.6 1.5 - 2.5 mg/dL   EKG (NOW)   Result Value Ref Range    Report       [facility]    Test Date:  2022  Pt Name:    BINU MONTOYA               Department: ER  MRN:        [id number]                      Room:       [facility]  Gender:     Female                       Technician: [id number]  :        1985                    Requested By:AMIRA FINNEGAN  Order #:    [id number]                    Reading MD: AMIRA FINNEGAN MD    Measurements  Intervals                                Axis  Rate:       82                           P:          52  WA:         180                          QRS:        50  QRSD:       112                          T:          15  QT:         414  QTc:        484    Interpretive Statements  Sinus rhythm  Borderline intraventricular conduction delay  Borderline T wave abnormalities  Borderline prolonged QT interval  Compared to ECG 07/02/2019 15:49:13  T-wave abnormality now present  Electronically Signed On 8-4-2022 19:14:18 PDT by AMIRA FINNEGAN MD       All labs reviewed by me.    EKG  12 lead EKG; Interpreted by emergency department physician    RADIOLOGY  CT-CTA CHEST PULMONARY ARTERY W/ RECONS   Final Result      Negative pulmonary CTA            CT-ABDOMEN-PELVIS WITH   Final Result      1.  Sigmoid diverticulitis with tiny foci of gas within the adjacent mesentery consistent with a tiny focal contained microperforation.      2.  Fatty liver.      3.  IUD present within the endometrial cavity.      4.  This was discussed with AMIRA FINNEGAN at 7:13 PM on 8/4/2022.      DX-CHEST-PORTABLE (1 VIEW)   Final Result      No evidence of acute cardiopulmonary process.        The radiologist's interpretation of all radiological studies have been reviewed by me.    COURSE & MEDICAL DECISION MAKING  Nursing notes, VS, PMSFHx reviewed in chart.    5:22 PM Patient seen and examined at bedside. Patient will be treated with LR infusion and Zofran 4 mg. Intravenous fluids administered for LR infusion. Ordered DX-chest, CT-abdomen, Urinalysis, D-dimer, HCG Qual serum, CBC with diff, CMP, Lipase, and EKG to evaluate her symptoms. The differential diagnoses include but are not limited to: Diverticulitis vs Colitis vs UTI vs Pyelonephritis vs Methamphetamine withdrawal vs Marijuana induced hyperemesis.     7:14 PM - Patient will be  treated with flagyl 500 mg and Rocephin injection 2 g.     7:18 PM - Patient was reevaluated at bedside. Patient is crying and bent over. Ordered 0.5 mg Dilaudid. She is short of breath and hyperventilating. I spoke general surgery. Will order chest CT-CTA chest. Plan to admit patient. Informed patient that she has diverticulitis. Patient verbalizes understanding and agreement to this plan of care.     7:18 PM - Paged Surgery.     7:35 PM - Paged [facility].     7:36 PM I discussed the patient's case and the above findings with Dr. Shields ([facility]) who kindly agreed to evaluate the patient.      7:37 PM I discussed the patient's case and the above findings with Dr. Nicholson (Surgery) who agrees with plan of care.      HYDRATION: Based on the patient's presentation of Dehydration the patient was given IV fluids. IV Hydration was used because oral hydration was not adequate alone. Upon recheck following hydration, the patient was mildly improved.      Critical Care  Due to the real possibility of a deterioration of this patient's condition required the highest level of my preparedness for sudden emergent intervention. I provided critical care services which included medication orders, frequent reevaluations of the patient's condition and response to treatment, ordering and reviewing test results and discussing the case with various consultants. The critical care time associated with the care of the patient was 45 minutes. Review chart for interventions. This time is exclusive of any other billable procedures.      DISPOSITION:  Patient will be hospitalized by Dr. Shields in stable condition.    FINAL IMPRESSION  1. Diverticulitis of colon with perforation    2. Methamphetamine use (HCC)          Glenan FORD (Emely), am scribing for, and in the presence of, Hallie Cedillo M.D..    Electronically signed by: Glenna Lindsey), 8/4/2022    Hlalie FORD M.D. personally performed the services described  in this documentation, as scribed by Glenna Gill in my presence, and it is both accurate and complete.    The note accurately reflects work and decisions made by me.  Hallie Cedillo M.D.  8/4/2022  11:19 PM

## 2022-08-05 NOTE — ED NOTES
ERP at bedside   Other (Free Text): 2 test spots today - one curetted and on treated with ln2 - no charge. Note Text (......Xxx Chief Complaint.): This diagnosis correlates with the Detail Level: Zone

## 2022-08-05 NOTE — ED TRIAGE NOTES
Marizadenise Jordan  36 y.o. female  Chief Complaint   Patient presents with   • Abdominal Pain     Beginning yesterday, associated with N/V   • Drug Ingestion     Pt states she was at a party on Sat/Sun night and smoked a blunt laced with methamphetamines. Pt has since felt unwell.        Vitals:    08/04/22 1715   BP: 125/83   Pulse: 92   Resp: (!) 22   Temp: 36.3 °C (97.3 °F)   SpO2: 97%       Pt BIBA from home with the above complaint. EMS gave 100mcg fentanyl and 2mg versed IV.

## 2022-08-05 NOTE — ED NOTES
Pt laying quietly in gurney. Pt is in no apparent distress. Breathing is non-labored with equal rise and fall of chest wall. VS stable in the monitor. Call light within reach.

## 2022-08-05 NOTE — CARE PLAN
The patient is Stable - Low risk of patient condition declining or worsening    Shift Goals  Clinical Goals: pain management  Patient Goals: pain management, sleep  Family Goals: n/a    Progress made toward(s) clinical / shift goals:    Problem: Pain - Standard  Goal: Alleviation of pain or a reduction in pain to the patient’s comfort goal  Outcome: Progressing  Note: Pain at tolerable level since arriving to the floor     Problem: Knowledge Deficit - Standard  Goal: Patient and family/care givers will demonstrate understanding of plan of care, disease process/condition, diagnostic tests and medications  Outcome: Progressing  Note: Aware of plan to be NPO for now and receiving PRN pain medication

## 2022-08-05 NOTE — PROGRESS NOTES
"    DATE: 8/5/2022    HD 2 Acute Diverticulitis    INTERVAL EVENTS:  Feeling a bit better.  Vitals reassuring    PHYSICAL EXAMINATION:      Vital Signs: /56   Pulse 89   Temp 37.2 °C (98.9 °F) (Temporal)   Resp 16   Ht 1.6 m (5' 3\")   Wt 122 kg (268 lb)   SpO2 94%    GENERAL:  No acute distress  HEENT: Pupils equal, round and reactive to light bilaterally.  Sclera are clear bilaterally.  No otorrhea.  No rhinorrhea.  Mucus membranes are moist.  CHEST:  Lungs are clear to auscultation bilaterally  CARDIOVASCULAR:  Regular rate and rhythm  ABDOMEN: Soft, protuberant, generalized tenderness  GENITOURINARY:  No winn in place, voiding without issues  EXTREMITIES:  Good range of motion through out  NEUROLOGIC:  Alert and oriented.  Cranial Nerves II through XII are grossly intact, no focal deficits appreciated  PSYCHIATRIC:  Normal affect and mood appropriate for age and condition  SKIN:  Warm and well perfused, no rashes    LABORATORY VALUES:   Recent Labs     08/04/22  1723 08/05/22  0128   WBC 16.6* 13.1*   RBC 4.83 4.41   HEMOGLOBIN 14.3 12.9   HEMATOCRIT 40.6 37.5   MCV 84.1 85.0   MCH 29.6 29.3   MCHC 35.2* 34.4   RDW 40.4 41.6   PLATELETCT 320 336   MPV 8.9* 9.2     Recent Labs     08/04/22  1723 08/05/22  0128   SODIUM 139 139   POTASSIUM 3.2* 3.7   CHLORIDE 106 107   CO2 17* 19*   GLUCOSE 111* 99   BUN 7* 6*   CREATININE 0.50 0.48*   CALCIUM 9.1 8.6     Recent Labs     08/04/22  1723 08/05/22  0128   ASTSGOT 21 17   ALTSGPT 22 16   TBILIRUBIN 1.7* 1.1   ALKPHOSPHAT 66 64   GLOBULIN 3.1 2.8            IMAGING:   CT-CTA CHEST PULMONARY ARTERY W/ RECONS   Final Result      Negative pulmonary CTA            CT-ABDOMEN-PELVIS WITH   Final Result      1.  Sigmoid diverticulitis with tiny foci of gas within the adjacent mesentery consistent with a tiny focal contained microperforation.      2.  Fatty liver.      3.  IUD present within the endometrial cavity.      4.  This was discussed with AMIRA FINNEGAN at " 7:13 PM on 8/4/2022.      DX-CHEST-PORTABLE (1 VIEW)   Final Result      No evidence of acute cardiopulmonary process.          ASSESSMENT AND PLAN:   1) Acute Diverticulitis with microperforation:    WBC and pain are improving.  Discussed the medical management of this acute episode followed by outpatient follow up with [facility]-rectal surgery for elective sigmoid colectomy.    -NPO, may have some ice chips, no meds  -IV fluids  -Broad spectrum IV antibiotics with coverage for enteric and anaerobic bacteria    I independently reviewed pertinent clinical lab tests since admission and ordered additional follow up clinical lab tests.  I independently reviewed pertinent radiographic images and the radiologist's reports since admission and ordered additional follow up radiographic studies.  The details of the available patient records in [facility] (including laboratory tests, culture data, medications, imaging, and other pertinent diagnostic tests) and that information was utilized as warranted in today's medical decision making for this patient.  I personally evaluated the patient condition at bedside.    Care interventions include:   Review of interval medical and surgical history, current medications and outpatient medication reconciliation, interval imaging studies and radiologist interpretation and interval laboratory values.  Management of Acute Complicated Diverticulitis.    Aggregated care time spent evaluating, reassessing, reviewing documentation, providing care, and managing this patient exclusive of procedures: 35 minutes       ____________________________________     Ankush Sosa M.D.

## 2022-08-05 NOTE — PROGRESS NOTES
4 Eyes Skin Assessment Completed by GONZÁLEZ Dunham and GONZÁLEZ Wynne.    Head WDL  Ears WDL  Nose WDL  Mouth WDL  Neck WDL  Breast/Chest WDL  Shoulder Blades WDL  Spine WDL  (R) Arm/Elbow/Hand WDL  (L) Arm/Elbow/Hand WDL  Abdomen Scar  Groin WDL  Scrotum/Coccyx/Buttocks WDL  (R) Leg Bruising  (L) Leg Bruising  (R) Heel/Foot/Toe WDL  (L) Heel/Foot/Toe WDL          Devices In Places Blood Pressure Cuff      Interventions In Place Pillows    Possible Skin Injury No    Pictures Uploaded Into Epic N/A  Wound Consult Placed N/A  RN Wound Prevention Protocol Ordered No

## 2022-08-05 NOTE — PROGRESS NOTES
[facility] MEDICINE PROGRESS NOTE        Attending:   Verónica Shi M.D.    Resident:   Cande Kelley M.D.    PATIENT:   Mariza Jordan; [id number]; 1985    ID:   36 y.o. female with pmhx of diverticulitis and sleep apnea admitted for microperforation of sigmoid colon from diverticulitis.    SUBJECTIVE:   No acute events overnight. Patient rates her pain a 5/10 improved from the 10/10 pain when she presented to the ED. The patient reports blood when she wipes after using the bathroom. She denies any blood in the toilet. Patient reports a history of PCOS but states that she has not started her menstrual cycle. Patient's HCG was negative in the ED. She is experiencing nausea but no vomiting.    OBJECTIVE:  Vitals:    08/05/22 0000 08/05/22 0100 08/05/22 0413 08/05/22 0846   BP: 108/58 127/79 (!) 95/51 116/56   Pulse: 63 100 76 89   Resp: 20 18 16 16   Temp:  36.2 °C (97.2 °F) 36.2 °C (97.2 °F) 37.2 °C (98.9 °F)   TempSrc:  Temporal Temporal Temporal   SpO2: 98% 96% 98% 94%   Weight:       Height:         No intake or output data in the 24 hours ending 08/05/22 0940    PHYSICAL EXAM:  General: No acute distress, afebrile, resting comfortably  HEENT: NC/AT. EOMI.   Cardiovascular: RRR without murmurs. Normal capillary refill   Respiratory: CTAB  Abdomen: Diffuse tenderness, soft, nondistended, no masses  EXT:  ARCE, no edema  Skin: No erythema/lesions   Neuro: Non-focal    LABS:  Recent Labs     08/04/22  1723 08/05/22  0128   WBC 16.6* 13.1*   RBC 4.83 4.41   HEMOGLOBIN 14.3 12.9   HEMATOCRIT 40.6 37.5   MCV 84.1 85.0   MCH 29.6 29.3   RDW 40.4 41.6   PLATELETCT 320 336   MPV 8.9* 9.2   NEUTSPOLYS 88.40* 75.20*   LYMPHOCYTES 6.30* 17.30*   MONOCYTES 4.20 5.90   EOSINOPHILS 0.30 0.80   BASOPHILS 0.30 0.30     Recent Labs     08/04/22  1723 08/05/22  0128   SODIUM 139 139   POTASSIUM 3.2* 3.7   CHLORIDE 106 107   CO2 17* 19*   BUN 7* 6*   CREATININE 0.50 0.48*   CALCIUM 9.1 8.6   MAGNESIUM 1.6  --     ALBUMIN 4.2 3.9     Estimated GFR/CRCL = Estimated Creatinine Clearance: 207.1 mL/min (A) (by C-G formula based on SCr of 0.48 mg/dL (L)).  Recent Labs     08/04/22  1723 08/05/22  0128   GLUCOSE 111* 99     Recent Labs     08/04/22  1723 08/05/22  0128   ASTSGOT 21 17   ALTSGPT 22 16   TBILIRUBIN 1.7* 1.1   ALKPHOSPHAT 66 64   GLOBULIN 3.1 2.8             No results for input(s): INR, APTT, FIBRINOGEN in the last 72 hours.    Invalid input(s): DIMER      IMAGING:  CT-CTA CHEST PULMONARY ARTERY W/ RECONS   Final Result      Negative pulmonary CTA            CT-ABDOMEN-PELVIS WITH   Final Result      1.  Sigmoid diverticulitis with tiny foci of gas within the adjacent mesentery consistent with a tiny focal contained microperforation.      2.  Fatty liver.      3.  IUD present within the endometrial cavity.      4.  This was discussed with AMIRA FINNEGAN at 7:13 PM on 8/4/2022.      DX-CHEST-PORTABLE (1 VIEW)   Final Result      No evidence of acute cardiopulmonary process.          MEDS:  Current Facility-Administered Medications   Medication Last Admin   • enoxaparin (Lovenox) inj 40 mg 40 mg at 08/05/22 0508   • hydrALAZINE (APRESOLINE) injection 10 mg     • ondansetron (ZOFRAN) syringe/vial injection 4 mg     • ondansetron (ZOFRAN ODT) dispertab 4 mg     • promethazine (PHENERGAN) tablet 12.5-25 mg     • promethazine (PHENERGAN) suppository 12.5-25 mg     • prochlorperazine (COMPAZINE) injection 5-10 mg 10 mg at 08/05/22 0738   • cefTRIAXone (Rocephin) syringe 2 g     • NS infusion Continue to Floor at 08/05/22 0030   • metroNIDAZOLE (Flagyl) IVPB 500 mg Stopped at 08/05/22 0610   • acetaminophen (Tylenol) tablet 650 mg     • morphine 4 MG/ML injection 2 mg 2 mg at 08/05/22 1156       ASSESSMENT/PLAN:    Problem   Rectal Bleeding   Leukocytosis   Hypokalemia     Diverticulitis of colon with perforation  Patient with history of diverticulitis who presented for worsening abdominal pain that started yesterday.  CT  abdomen pelvis showed sigmoid diverticulitis with tiny focal contained microperforation.  Patient was given ceftriaxone and Flagyl in the ER.  General surgery was consulted who recommended nonoperative management with IV ceftriaxone and Flagyl as well as keeping the patient n.p.o.  -Admit to medical floor  -Continue IV ceftriaxone every 24 hours and Flagyl every 6 hours  -Keep patient n.p.o.  -Tylenol and morphine as needed for pain  -NS at 125 mL/h  -Zofran as needed for nausea    Leukocytosis  WBC 16.6. Now 13.1  Ceftriaxone / Flagyl for diverticulitis  Follow    Hypokalemia  K 3.2  K low - replace and follow  Check Mg    Leukocytosis  WBC 16.6 on admission. 13.1 today  -Repeat CBC tomorrow morning  -Continue ceftriaxone and Flagyl    Hypokalemia  Potassium 3.2 on admission. 3.7 this AM  -s/p 40 mEq KDur  -Recheck CMP tomorrow morning    Rectal bleeding  -Reports blood when wiping. Previously had blood in toilet bowl. No blood in stool. Hgb 16.6->13.1  Ddx: 2/2 sigmoid diverticulitis with perforation, hemorrhoids  -H&H tomorrow AM  -CTM    Morbid obesity with BMI of 45.0-49.9, adult ([facility])  8/4 BMI 47.47        Cande Kelley M.D.

## 2022-08-05 NOTE — ASSESSMENT & PLAN NOTE
Abdominal pain x 1day  CT shows:  there is sigmoid diverticulitis characterized by bowel wall thickening with inflammatory change involving the bowel wall and the surrounding mesenteric fat. There are tiny foci of gas within the adjacent mesentery consistent with microperforation. There is sigmoid diverticulosis.  Non operative management  NPO / IV abx

## 2022-08-05 NOTE — ED NOTES
Patient resting. No signs of distress noted. Equal chest rise and fall. No further needs at this time. Call light within reach.

## 2022-08-05 NOTE — DIETARY
NUTRITION SERVICES: BMI - Pt with BMI >40 (=Body mass index is 47.47 kg/m².), Class III obesity. Weight via other healthcare provider, though appears consistent with weight hx per Flowsheet in chart. Weight loss counseling not appropriate in acute care setting. RECOMMEND - If appropriate at DC please refer to outpatient nutrition services for weight management.

## 2022-08-05 NOTE — CARE PLAN
The patient is Stable - Low risk of patient condition declining or worsening    Shift Goals  Clinical Goals: Pain management  Patient Goals: pain relief  Family Goals: taz    Progress made toward(s) clinical / shift goals:  Patient is able to voice needs, prn pain meds given as needed, patient updated on poc and aware.     Patient is not progressing towards the following goals:

## 2022-08-05 NOTE — ASSESSMENT & PLAN NOTE
-Reports blood when wiping. Previously had blood in toilet bowl. No blood in stool. Hgb 16.6->13.1. Now stable at 12  Ddx: 2/2 sigmoid diverticulitis with perforation, hemorrhoids  -Hgb stable  -CTM

## 2022-08-05 NOTE — ASSESSMENT & PLAN NOTE
Patient with history of diverticulitis who presented for worsening abdominal pain that started the day prior to admission.  CT abdomen pelvis showed sigmoid diverticulitis with tiny focal contained microperforation.  Patient was given ceftriaxone and Flagyl in the ER.  General surgery was consulted who recommended to continue non operative management with IV Ceftriaxone and Flagyl. Patient transitioned from NPO to clear liquid diet. Transitioned to Augmentin 875mg TID  -Admit to medical floor  -DC patient on augmentin  -Clear liquid diet  -Tylenol and morphine as needed for pain  -Zofran as needed for nausea
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
## 2022-08-05 NOTE — ED NOTES
"Med rec completed per patient at bedside.  Allergies reviewed with patient. NKDA.  No outpatient antibiotics in the last 30 days.  Patient's preferred pharmacy: [facility] on [address].    Patient denies using prescription medications aside from a Mirena IUD. Patient unsure when IUD was placed.  Patient reports she had 2 tablets of omeprazole \"sometime today\" (unsure of morning or afternoon).  "

## 2022-08-06 LAB
ANION GAP SERPL CALC-SCNC: 11 MMOL/L (ref 7–16)
BASOPHILS # BLD AUTO: 0.2 % (ref 0–1.8)
BASOPHILS # BLD: 0.02 K/UL (ref 0–0.12)
BUN SERPL-MCNC: 4 MG/DL (ref 8–22)
CALCIUM SERPL-MCNC: 8.3 MG/DL (ref 8.5–10.5)
CHLORIDE SERPL-SCNC: 106 MMOL/L (ref 96–112)
CO2 SERPL-SCNC: 19 MMOL/L (ref 20–33)
CREAT SERPL-MCNC: 0.47 MG/DL (ref 0.5–1.4)
EOSINOPHIL # BLD AUTO: 0.09 K/UL (ref 0–0.51)
EOSINOPHIL NFR BLD: 1.1 % (ref 0–6.9)
ERYTHROCYTE [DISTWIDTH] IN BLOOD BY AUTOMATED COUNT: 43.1 FL (ref 35.9–50)
GFR SERPLBLD CREATININE-BSD FMLA CKD-EPI: 127 ML/MIN/1.73 M 2
GLUCOSE SERPL-MCNC: 77 MG/DL (ref 65–99)
HCT VFR BLD AUTO: 36.5 % (ref 37–47)
HGB BLD-MCNC: 12.1 G/DL (ref 12–16)
IMM GRANULOCYTES # BLD AUTO: 0.03 K/UL (ref 0–0.11)
IMM GRANULOCYTES NFR BLD AUTO: 0.4 % (ref 0–0.9)
LYMPHOCYTES # BLD AUTO: 0.99 K/UL (ref 1–4.8)
LYMPHOCYTES NFR BLD: 12.2 % (ref 22–41)
MAGNESIUM SERPL-MCNC: 2.2 MG/DL (ref 1.5–2.5)
MCH RBC QN AUTO: 28.9 PG (ref 27–33)
MCHC RBC AUTO-ENTMCNC: 33.2 G/DL (ref 33.6–35)
MCV RBC AUTO: 87.3 FL (ref 81.4–97.8)
MONOCYTES # BLD AUTO: 0.55 K/UL (ref 0–0.85)
MONOCYTES NFR BLD AUTO: 6.8 % (ref 0–13.4)
NEUTROPHILS # BLD AUTO: 6.46 K/UL (ref 2–7.15)
NEUTROPHILS NFR BLD: 79.3 % (ref 44–72)
NRBC # BLD AUTO: 0 K/UL
NRBC BLD-RTO: 0 /100 WBC
PLATELET # BLD AUTO: 254 K/UL (ref 164–446)
PMV BLD AUTO: 9.2 FL (ref 9–12.9)
POTASSIUM SERPL-SCNC: 3.6 MMOL/L (ref 3.6–5.5)
RBC # BLD AUTO: 4.18 M/UL (ref 4.2–5.4)
SODIUM SERPL-SCNC: 136 MMOL/L (ref 135–145)
WBC # BLD AUTO: 8.1 K/UL (ref 4.8–10.8)

## 2022-08-06 PROCEDURE — 85025 COMPLETE CBC W/AUTO DIFF WBC: CPT

## 2022-08-06 PROCEDURE — 80048 BASIC METABOLIC PNL TOTAL CA: CPT

## 2022-08-06 PROCEDURE — 99232 SBSQ HOSP IP/OBS MODERATE 35: CPT | Mod: GC | Performed by: FAMILY MEDICINE

## 2022-08-06 PROCEDURE — 770001 HCHG ROOM/CARE - MED/SURG/GYN PRIV*

## 2022-08-06 PROCEDURE — 700111 HCHG RX REV CODE 636 W/ 250 OVERRIDE (IP): Performed by: STUDENT IN AN ORGANIZED HEALTH CARE EDUCATION/TRAINING PROGRAM

## 2022-08-06 PROCEDURE — 700101 HCHG RX REV CODE 250: Performed by: STUDENT IN AN ORGANIZED HEALTH CARE EDUCATION/TRAINING PROGRAM

## 2022-08-06 PROCEDURE — 36415 COLL VENOUS BLD VENIPUNCTURE: CPT

## 2022-08-06 PROCEDURE — 83735 ASSAY OF MAGNESIUM: CPT

## 2022-08-06 PROCEDURE — 99232 SBSQ HOSP IP/OBS MODERATE 35: CPT | Performed by: SURGERY

## 2022-08-06 RX ADMIN — ENOXAPARIN SODIUM 40 MG: 40 INJECTION SUBCUTANEOUS at 05:35

## 2022-08-06 RX ADMIN — ONDANSETRON 4 MG: 4 TABLET, ORALLY DISINTEGRATING ORAL at 16:29

## 2022-08-06 RX ADMIN — MORPHINE SULFATE 2 MG: 4 INJECTION INTRAVENOUS at 02:38

## 2022-08-06 RX ADMIN — METRONIDAZOLE 500 MG: 500 INJECTION, SOLUTION INTRAVENOUS at 21:23

## 2022-08-06 RX ADMIN — ONDANSETRON HYDROCHLORIDE 4 MG: 2 SOLUTION INTRAMUSCULAR; INTRAVENOUS at 21:34

## 2022-08-06 RX ADMIN — METRONIDAZOLE 500 MG: 500 INJECTION, SOLUTION INTRAVENOUS at 13:56

## 2022-08-06 RX ADMIN — ENOXAPARIN SODIUM 40 MG: 40 INJECTION SUBCUTANEOUS at 18:07

## 2022-08-06 RX ADMIN — ONDANSETRON HYDROCHLORIDE 4 MG: 2 SOLUTION INTRAMUSCULAR; INTRAVENOUS at 07:48

## 2022-08-06 RX ADMIN — MORPHINE SULFATE 2 MG: 4 INJECTION INTRAVENOUS at 07:41

## 2022-08-06 RX ADMIN — ONDANSETRON 4 MG: 4 TABLET, ORALLY DISINTEGRATING ORAL at 02:37

## 2022-08-06 RX ADMIN — METRONIDAZOLE 500 MG: 500 INJECTION, SOLUTION INTRAVENOUS at 05:34

## 2022-08-06 RX ADMIN — CEFTRIAXONE SODIUM 2 G: 10 INJECTION, POWDER, FOR SOLUTION INTRAVENOUS at 21:23

## 2022-08-06 ASSESSMENT — PAIN DESCRIPTION - PAIN TYPE
TYPE: ACUTE PAIN
TYPE: ACUTE PAIN

## 2022-08-06 NOTE — PROGRESS NOTES
[facility] [facility] [place]        Attending:   Dr. Gibson    Resident:   Cande Kelley M.D.    PATIENT:   Mariza Jordan; [id number]; 1985    ID:   36 y.o. female with pmhx of diverticulitis and sleep apnea admitted for microperforation of sigmoid colon from diverticulitis.    SUBJECTIVE:   No acute events overnight. Patient reports significant improvement in her pain and nausea. She denies any fevers, chills, chest pain, or SOB. The patient continues to endorse pinkish-red blood on the toilet paper when she wipes after urinating. She states that she had some burning with urination this morning.    OBJECTIVE:  Vitals:    08/05/22 1907 08/05/22 2140 08/06/22 0319 08/06/22 0733   BP: 112/45 133/67 133/66 108/61   Pulse: 81  75 74   Resp: 17  18 18   Temp: 37.3 °C (99.2 °F)  37.9 °C (100.2 °F) 36.8 °C (98.3 °F)   TempSrc: Temporal  Temporal Temporal   SpO2: 97%  95% 95%   Weight:       Height:         No intake or output data in the 24 hours ending 08/06/22 0609    PHYSICAL EXAM:  General: No acute distress, afebrile, resting comfortably  HEENT: NC/AT.  Cardiovascular: RRR without murmurs. Normal capillary refill   Respiratory: CTAB  Abdomen: Tender to palpation in epigastric and LLQ region, soft, nondistended, no masses  EXT:  ARCE, no edema  Skin: No erythema/lesions   Neuro: Non-focal    LABS:  Recent Labs     08/04/22  1723 08/05/22  0128 08/06/22  0622   WBC 16.6* 13.1* 8.1   RBC 4.83 4.41 4.18*   HEMOGLOBIN 14.3 12.9 12.1   HEMATOCRIT 40.6 37.5 36.5*   MCV 84.1 85.0 87.3   MCH 29.6 29.3 28.9   RDW 40.4 41.6 43.1   PLATELETCT 320 336 254   MPV 8.9* 9.2 9.2   NEUTSPOLYS 88.40* 75.20* 79.30*   LYMPHOCYTES 6.30* 17.30* 12.20*   MONOCYTES 4.20 5.90 6.80   EOSINOPHILS 0.30 0.80 1.10   BASOPHILS 0.30 0.30 0.20     Recent Labs     08/04/22  1723 08/05/22  0128 08/06/22  0622   SODIUM 139 139 136   POTASSIUM 3.2* 3.7 3.6   CHLORIDE 106 107 106   CO2 17* 19* 19*   BUN 7* 6* 4*   CREATININE 0.50 0.48*  0.47*   CALCIUM 9.1 8.6 8.3*   MAGNESIUM 1.6  --  2.2   ALBUMIN 4.2 3.9  --      Estimated GFR/CRCL = Estimated Creatinine Clearance: 211.5 mL/min (A) (by C-G formula based on SCr of 0.47 mg/dL (L)).  Recent Labs     08/04/22  1723 08/05/22  0128 08/06/22 0622   GLUCOSE 111* 99 77     Recent Labs     08/04/22  1723 08/05/22  0128   ASTSGOT 21 17   ALTSGPT 22 16   TBILIRUBIN 1.7* 1.1   ALKPHOSPHAT 66 64   GLOBULIN 3.1 2.8             No results for input(s): INR, APTT, FIBRINOGEN in the last 72 hours.    Invalid input(s): DIMER      IMAGING:  CT-CTA CHEST PULMONARY ARTERY W/ RECONS   Final Result      Negative pulmonary CTA            CT-ABDOMEN-PELVIS WITH   Final Result      1.  Sigmoid diverticulitis with tiny foci of gas within the adjacent mesentery consistent with a tiny focal contained microperforation.      2.  Fatty liver.      3.  IUD present within the endometrial cavity.      4.  This was discussed with AMIRA FINNEGAN at 7:13 PM on 8/4/2022.      DX-CHEST-PORTABLE (1 VIEW)   Final Result      No evidence of acute cardiopulmonary process.          MEDS:  Current Facility-Administered Medications   Medication Last Admin   • enoxaparin (Lovenox) inj 40 mg 40 mg at 08/06/22 0535   • hydrALAZINE (APRESOLINE) injection 10 mg     • ondansetron (ZOFRAN) syringe/vial injection 4 mg 4 mg at 08/06/22 0748   • ondansetron (ZOFRAN ODT) dispertab 4 mg 4 mg at 08/06/22 0237   • promethazine (PHENERGAN) tablet 12.5-25 mg     • promethazine (PHENERGAN) suppository 12.5-25 mg     • prochlorperazine (COMPAZINE) injection 5-10 mg 10 mg at 08/05/22 0738   • cefTRIAXone (Rocephin) syringe 2 g 2 g at 08/05/22 2000   • metroNIDAZOLE (Flagyl) IVPB 500 mg Stopped at 08/06/22 0634   • acetaminophen (Tylenol) tablet 650 mg     • morphine 4 MG/ML injection 2 mg 2 mg at 08/06/22 0741       ASSESSMENT/PLAN:    Problem   Rectal Bleeding   Diverticulitis of Colon With Perforation   Leukocytosis   Hypokalemia       Diverticulitis of colon  with perforation  Patient with history of diverticulitis who presented for worsening abdominal pain that started yesterday.  CT abdomen pelvis showed sigmoid diverticulitis with tiny focal contained microperforation.  Patient was given ceftriaxone and Flagyl in the ER.  General surgery was consulted who recommended to continue non operative management with IV Ceftriaxone and Flagyl. Will transition patient from NPO to clear liquid diet. Consider transitioning to Augmentin 875mg TID tomorrow if patient continues to improve and WBC is normal.  -Admit to medical floor  -Continue IV ceftriaxone every 24 hours and Flagyl every 6 hours  -Clear liquid diet  -Tylenol and morphine as needed for pain  -NS at 125 mL/h  -Zofran as needed for nausea    Morbid obesity with BMI of 45.0-49.9, adult ([facility])  8/4 BMI 47.47    Leukocytosis  WBC 16.6  Ceftriaxone / Flagyl for diverticulitis  Follow    Hypokalemia  K 3.2  K low - replace and follow  Check Mg    Leukocytosis  WBC 16.6 on admission. 13.1 today  -Repeat CBC tomorrow morning  -Continue ceftriaxone and Flagyl    Hypokalemia  Potassium 3.2 on admission. 3.7 this AM  -s/p 40 mEq KDur  -Recheck CMP tomorrow morning    Rectal bleeding  -Reports blood when wiping. Previously had blood in toilet bowl. No blood in stool. Hgb 16.6->13.1  Ddx: 2/2 sigmoid diverticulitis with perforation, hemorrhoids  -Hgb stable  -CT        Cande Kelley M.D.

## 2022-08-06 NOTE — PROGRESS NOTES
"    DATE: 8/6/2022    HD 4 Acute Diverticulitis    INTERVAL EVENTS:  Feeling a bit better.  Vitals reassuring.  WBC now normal.  Seen at bedside with FM.    PHYSICAL EXAMINATION:      Vital Signs: /61   Pulse 74   Temp 36.8 °C (98.3 °F) (Temporal)   Resp 18   Ht 1.6 m (5' 3\")   Wt 122 kg (268 lb)   SpO2 95%    GENERAL:  No acute distress  HEENT: Pupils equal, round and reactive to light bilaterally.  Sclera are clear bilaterally.  No otorrhea.  No rhinorrhea.  Mucus membranes are moist.  CHEST:  Lungs are clear to auscultation bilaterally  CARDIOVASCULAR:  Regular rate and rhythm  ABDOMEN: Soft, protuberant, generalized tenderness  GENITOURINARY:  No winn in place, voiding without issues  EXTREMITIES:  Good range of motion through out  NEUROLOGIC:  Alert and oriented.  Cranial Nerves II through XII are grossly intact, no focal deficits appreciated  PSYCHIATRIC:  Normal affect and mood appropriate for age and condition  SKIN:  Warm and well perfused, no rashes    LABORATORY VALUES:   Recent Labs     08/04/22 1723 08/05/22 0128 08/06/22  0622   WBC 16.6* 13.1* 8.1   RBC 4.83 4.41 4.18*   HEMOGLOBIN 14.3 12.9 12.1   HEMATOCRIT 40.6 37.5 36.5*   MCV 84.1 85.0 87.3   MCH 29.6 29.3 28.9   MCHC 35.2* 34.4 33.2*   RDW 40.4 41.6 43.1   PLATELETCT 320 336 254   MPV 8.9* 9.2 9.2     Recent Labs     08/04/22 1723 08/05/22 0128 08/06/22  0622   SODIUM 139 139 136   POTASSIUM 3.2* 3.7 3.6   CHLORIDE 106 107 106   CO2 17* 19* 19*   GLUCOSE 111* 99 77   BUN 7* 6* 4*   CREATININE 0.50 0.48* 0.47*   CALCIUM 9.1 8.6 8.3*     Recent Labs     08/04/22 1723 08/05/22 0128   ASTSGOT 21 17   ALTSGPT 22 16   TBILIRUBIN 1.7* 1.1   ALKPHOSPHAT 66 64   GLOBULIN 3.1 2.8            IMAGING:   CT-CTA CHEST PULMONARY ARTERY W/ RECONS   Final Result      Negative pulmonary CTA            CT-ABDOMEN-PELVIS WITH   Final Result      1.  Sigmoid diverticulitis with tiny foci of gas within the adjacent mesentery consistent with a tiny " focal contained microperforation.      2.  Fatty liver.      3.  IUD present within the endometrial cavity.      4.  This was discussed with AMIRA VIJILEIGH ANN at 7:13 PM on 8/4/2022.      DX-CHEST-PORTABLE (1 VIEW)   Final Result      No evidence of acute cardiopulmonary process.          ASSESSMENT AND PLAN:   1) Acute Diverticulitis with microperforation:    WBC and pain are improving.  Discussed the medical management of this acute episode followed by outpatient follow up with [place]-rectal surgery for elective sigmoid colectomy.    -Clears, will continue outpatient  -Stop IV fluids  -Broad spectrum IV antibiotics with coverage for enteric and anaerobic bacteria, consideration for transitioning to Augmentin 875mg TID tomorrow if continues to improve and WBC normal.    I independently reviewed pertinent clinical lab tests since admission and ordered additional follow up clinical lab tests.  I independently reviewed pertinent radiographic images and the radiologist's reports since admission and ordered additional follow up radiographic studies.  The details of the available patient records in [facility] (including laboratory tests, culture data, medications, imaging, and other pertinent diagnostic tests) and that information was utilized as warranted in today's medical decision making for this patient.  I personally evaluated the patient condition at bedside.    Care interventions include:   Review of interval medical and surgical history, current medications and outpatient medication reconciliation, interval imaging studies and radiologist interpretation and interval laboratory values.  Management of Acute Complicated Diverticulitis.    Aggregated care time spent evaluating, reassessing, reviewing documentation, providing care, and managing this patient exclusive of procedures: 35 minutes       ____________________________________     Ankush Sosa M.D.

## 2022-08-06 NOTE — CARE PLAN
The patient is Stable - Low risk of patient condition declining or worsening    Shift Goals  Clinical Goals: Pain management  Patient Goals: IVF  Family Goals: taz    Progress made toward(s) clinical / shift goals: Patient has prn meds given as indicated. Patient is being educated and explained by the interdisciplinary team.      Patient is not progressing towards the following goals:

## 2022-08-06 NOTE — CARE PLAN
The patient is Stable - Low risk of patient condition declining or worsening    Shift Goals  Clinical Goals: Pain management  Patient Goals: pain relief  Family Goals: taz    Progress made toward(s) clinical / shift goals:    Problem: Pain - Standard  Goal: Alleviation of pain or a reduction in pain to the patient’s comfort goal  Outcome: Progressing     Problem: Knowledge Deficit - Standard  Goal: Patient and family/care givers will demonstrate understanding of plan of care, disease process/condition, diagnostic tests and medications  Outcome: Progressing       Patient is not progressing towards the following goals:

## 2022-08-07 LAB
BASOPHILS # BLD AUTO: 0.2 % (ref 0–1.8)
BASOPHILS # BLD: 0.01 K/UL (ref 0–0.12)
EOSINOPHIL # BLD AUTO: 0.16 K/UL (ref 0–0.51)
EOSINOPHIL NFR BLD: 2.5 % (ref 0–6.9)
ERYTHROCYTE [DISTWIDTH] IN BLOOD BY AUTOMATED COUNT: 42.4 FL (ref 35.9–50)
HCT VFR BLD AUTO: 35.9 % (ref 37–47)
HGB BLD-MCNC: 12.1 G/DL (ref 12–16)
IMM GRANULOCYTES # BLD AUTO: 0.04 K/UL (ref 0–0.11)
IMM GRANULOCYTES NFR BLD AUTO: 0.6 % (ref 0–0.9)
LYMPHOCYTES # BLD AUTO: 1.14 K/UL (ref 1–4.8)
LYMPHOCYTES NFR BLD: 17.6 % (ref 22–41)
MCH RBC QN AUTO: 29.3 PG (ref 27–33)
MCHC RBC AUTO-ENTMCNC: 33.7 G/DL (ref 33.6–35)
MCV RBC AUTO: 86.9 FL (ref 81.4–97.8)
MONOCYTES # BLD AUTO: 0.64 K/UL (ref 0–0.85)
MONOCYTES NFR BLD AUTO: 9.9 % (ref 0–13.4)
NEUTROPHILS # BLD AUTO: 4.47 K/UL (ref 2–7.15)
NEUTROPHILS NFR BLD: 69.2 % (ref 44–72)
NRBC # BLD AUTO: 0 K/UL
NRBC BLD-RTO: 0 /100 WBC
PLATELET # BLD AUTO: 283 K/UL (ref 164–446)
PMV BLD AUTO: 9.3 FL (ref 9–12.9)
RBC # BLD AUTO: 4.13 M/UL (ref 4.2–5.4)
WBC # BLD AUTO: 6.5 K/UL (ref 4.8–10.8)

## 2022-08-07 PROCEDURE — 85025 COMPLETE CBC W/AUTO DIFF WBC: CPT

## 2022-08-07 PROCEDURE — 700101 HCHG RX REV CODE 250: Performed by: STUDENT IN AN ORGANIZED HEALTH CARE EDUCATION/TRAINING PROGRAM

## 2022-08-07 PROCEDURE — 99232 SBSQ HOSP IP/OBS MODERATE 35: CPT | Performed by: SURGERY

## 2022-08-07 PROCEDURE — 700102 HCHG RX REV CODE 250 W/ 637 OVERRIDE(OP)

## 2022-08-07 PROCEDURE — 99232 SBSQ HOSP IP/OBS MODERATE 35: CPT | Mod: GC | Performed by: FAMILY MEDICINE

## 2022-08-07 PROCEDURE — A9270 NON-COVERED ITEM OR SERVICE: HCPCS

## 2022-08-07 PROCEDURE — 700111 HCHG RX REV CODE 636 W/ 250 OVERRIDE (IP): Performed by: STUDENT IN AN ORGANIZED HEALTH CARE EDUCATION/TRAINING PROGRAM

## 2022-08-07 PROCEDURE — 770001 HCHG ROOM/CARE - MED/SURG/GYN PRIV*

## 2022-08-07 RX ORDER — AMOXICILLIN AND CLAVULANATE POTASSIUM 875; 125 MG/1; MG/1
1 TABLET, FILM COATED ORAL EVERY 8 HOURS
Status: DISCONTINUED | OUTPATIENT
Start: 2022-08-07 | End: 2022-08-08 | Stop reason: HOSPADM

## 2022-08-07 RX ADMIN — PROCHLORPERAZINE EDISYLATE 10 MG: 5 INJECTION INTRAMUSCULAR; INTRAVENOUS at 09:23

## 2022-08-07 RX ADMIN — METRONIDAZOLE 500 MG: 500 INJECTION, SOLUTION INTRAVENOUS at 05:20

## 2022-08-07 RX ADMIN — AMOXICILLIN AND CLAVULANATE POTASSIUM 1 TABLET: 875; 125 TABLET, FILM COATED ORAL at 09:18

## 2022-08-07 RX ADMIN — ENOXAPARIN SODIUM 40 MG: 40 INJECTION SUBCUTANEOUS at 16:56

## 2022-08-07 RX ADMIN — AMOXICILLIN AND CLAVULANATE POTASSIUM 1 TABLET: 875; 125 TABLET, FILM COATED ORAL at 21:18

## 2022-08-07 RX ADMIN — ONDANSETRON HYDROCHLORIDE 4 MG: 2 SOLUTION INTRAMUSCULAR; INTRAVENOUS at 05:47

## 2022-08-07 RX ADMIN — ENOXAPARIN SODIUM 40 MG: 40 INJECTION SUBCUTANEOUS at 05:20

## 2022-08-07 RX ADMIN — AMOXICILLIN AND CLAVULANATE POTASSIUM 1 TABLET: 875; 125 TABLET, FILM COATED ORAL at 14:35

## 2022-08-07 RX ADMIN — PROCHLORPERAZINE EDISYLATE 10 MG: 5 INJECTION INTRAMUSCULAR; INTRAVENOUS at 09:24

## 2022-08-07 RX ADMIN — PROCHLORPERAZINE EDISYLATE 10 MG: 5 INJECTION INTRAMUSCULAR; INTRAVENOUS at 16:33

## 2022-08-07 NOTE — PROGRESS NOTES
"    DATE: 8/7/2022    HD 5 Acute Diverticulitis    INTERVAL EVENTS:  Pain much better  WBC remains normal  Discussed importance of liquid diet while colon is healing    PHYSICAL EXAMINATION:      Vital Signs: /69   Pulse 61   Temp 36.1 °C (96.9 °F) (Temporal)   Resp 16   Ht 1.6 m (5' 3\")   Wt 122 kg (268 lb)   SpO2 99%    GENERAL:  No acute distress  HEENT: Pupils equal, round and reactive to light bilaterally.  Sclera are clear bilaterally.  No otorrhea.  No rhinorrhea.  Mucus membranes are moist.  CHEST:  Lungs are clear to auscultation bilaterally  CARDIOVASCULAR:  Regular rate and rhythm  ABDOMEN: Soft, protuberant, generalized tenderness  GENITOURINARY:  No winn in place, voiding without issues  EXTREMITIES:  Good range of motion through out  NEUROLOGIC:  Alert and oriented.  Cranial Nerves II through XII are grossly intact, no focal deficits appreciated  PSYCHIATRIC:  Normal affect and mood appropriate for age and condition  SKIN:  Warm and well perfused, no rashes    LABORATORY VALUES:   Recent Labs     08/05/22 0128 08/06/22  0622 08/07/22  0155   WBC 13.1* 8.1 6.5   RBC 4.41 4.18* 4.13*   HEMOGLOBIN 12.9 12.1 12.1   HEMATOCRIT 37.5 36.5* 35.9*   MCV 85.0 87.3 86.9   MCH 29.3 28.9 29.3   MCHC 34.4 33.2* 33.7   RDW 41.6 43.1 42.4   PLATELETCT 336 254 283   MPV 9.2 9.2 9.3     Recent Labs     08/04/22 1723 08/05/22  0128 08/06/22  0622   SODIUM 139 139 136   POTASSIUM 3.2* 3.7 3.6   CHLORIDE 106 107 106   CO2 17* 19* 19*   GLUCOSE 111* 99 77   BUN 7* 6* 4*   CREATININE 0.50 0.48* 0.47*   CALCIUM 9.1 8.6 8.3*     Recent Labs     08/04/22  1723 08/05/22  0128   ASTSGOT 21 17   ALTSGPT 22 16   TBILIRUBIN 1.7* 1.1   ALKPHOSPHAT 66 64   GLOBULIN 3.1 2.8            IMAGING:   CT-CTA CHEST PULMONARY ARTERY W/ RECONS   Final Result      Negative pulmonary CTA            CT-ABDOMEN-PELVIS WITH   Final Result      1.  Sigmoid diverticulitis with tiny foci of gas within the adjacent mesentery consistent " with a tiny focal contained microperforation.      2.  Fatty liver.      3.  IUD present within the endometrial cavity.      4.  This was discussed with AMIRA FINNEGAN at 7:13 PM on 8/4/2022.      DX-CHEST-PORTABLE (1 VIEW)   Final Result      No evidence of acute cardiopulmonary process.          ASSESSMENT AND PLAN:   1) Acute Diverticulitis with microperforation:    WBC and pain are improving.  Again discussed the medical management of this acute episode followed by outpatient follow up with [place]-rectal surgery for elective sigmoid colectomy whether that follow up occurs in [place] or in [facility].    -Continue liquid diet outpatient  -Stop IV fluids  -Transition to Augmentin 875mg TID for 7 more days  -CBC in am, if WBC remains normal and feeling good, can discharge    Contact [facility] for any questions/concerns    I independently reviewed pertinent clinical lab tests since admission and ordered additional follow up clinical lab tests.  I independently reviewed pertinent radiographic images and the radiologist's reports since admission and ordered additional follow up radiographic studies.  The details of the available patient records in [facility] (including laboratory tests, culture data, medications, imaging, and other pertinent diagnostic tests) and that information was utilized as warranted in today's medical decision making for this patient.  I personally evaluated the patient condition at bedside.    Care interventions include:   Review of interval medical and surgical history, current medications and outpatient medication reconciliation, interval imaging studies and radiologist interpretation and interval laboratory values.  Management of Acute Complicated Diverticulitis.    Aggregated care time spent evaluating, reassessing, reviewing documentation, providing care, and managing this patient exclusive of procedures: 35 minutes       ____________________________________     Ankush Sosa  M.D.

## 2022-08-07 NOTE — CARE PLAN
The patient is Stable - Low risk of patient condition declining or worsening    Shift Goals  Clinical Goals: Pain management, Abx  Patient Goals: Rest  Family Goals: CAROLEE    Progress made toward(s) clinical / shift goals:    Problem: Pain - Standard  Goal: Alleviation of pain or a reduction in pain to the patient’s comfort goal  Outcome: Progressing     Problem: Knowledge Deficit - Standard  Goal: Patient and family/care givers will demonstrate understanding of plan of care, disease process/condition, diagnostic tests and medications  Outcome: Progressing

## 2022-08-07 NOTE — CARE PLAN
The patient is Stable - Low risk of patient condition declining or worsening    Shift Goals  Clinical Goals: ABX tx  Patient Goals: rest  Family Goals: taz    Progress made toward(s) clinical / shift goals:  Patient being informed on POC and aware by interdisciplinary team. Patient knows to be on CLD and to ask for prn pain meds.     Patient is not progressing towards the following goals:

## 2022-08-07 NOTE — PROGRESS NOTES
[facility] FAMILY MEDICINE PROGRESS NOTE        Attending:   Dr. Gibson    Resident:   Cande Kelley M.D.    PATIENT:   Mariza Jordan; [id number]; 1985    ID:   36 y.o. female with pmhx of diverticulitis and sleep apnea admitted for microperforation of sigmoid colon from diverticulitis.    SUBJECTIVE:   No acute events overnight. Patient states that her pain has significantly improved although she continues to have nausea. The patient reports that the pinkish blood when she wipes after urinating is also improving. She denies any fevers or chills.    OBJECTIVE:  Vitals:    08/06/22 1650 08/06/22 2025 08/07/22 0439 08/07/22 0659   BP: 135/65 106/62 108/60 105/69   Pulse: 81 76 66 61   Resp: 18 17 18 16   Temp: 36.6 °C (97.8 °F) 36.4 °C (97.5 °F) 37.1 °C (98.8 °F) 36.1 °C (96.9 °F)   TempSrc: Temporal Temporal Temporal Temporal   SpO2: 96% 97% 96% 99%   Weight:       Height:           Intake/Output Summary (Last 24 hours) at 8/7/2022 0516  Last data filed at 8/6/2022 2200  Gross per 24 hour   Intake 200 ml   Output --   Net 200 ml       PHYSICAL EXAM:  General: No acute distress, afebrile, resting comfortably  HEENT: NC/AT. EOMI.   Cardiovascular: RRR without murmurs. Normal capillary refill   Respiratory: CTAB  Abdomen: minimal tenderness to palpation in left upper and lower quadrant, soft, nondistended, no masses  EXT:  ARCE, no edema  Skin: No erythema/lesions   Neuro: Non-focal    LABS:  Recent Labs     08/05/22  0128 08/06/22  0622 08/07/22  0155   WBC 13.1* 8.1 6.5   RBC 4.41 4.18* 4.13*   HEMOGLOBIN 12.9 12.1 12.1   HEMATOCRIT 37.5 36.5* 35.9*   MCV 85.0 87.3 86.9   MCH 29.3 28.9 29.3   RDW 41.6 43.1 42.4   PLATELETCT 336 254 283   MPV 9.2 9.2 9.3   NEUTSPOLYS 75.20* 79.30* 69.20   LYMPHOCYTES 17.30* 12.20* 17.60*   MONOCYTES 5.90 6.80 9.90   EOSINOPHILS 0.80 1.10 2.50   BASOPHILS 0.30 0.20 0.20     Recent Labs     08/04/22  1723 08/05/22  0128 08/06/22  0622   SODIUM 139 139 136   POTASSIUM 3.2* 3.7 3.6    CHLORIDE 106 107 106   CO2 17* 19* 19*   BUN 7* 6* 4*   CREATININE 0.50 0.48* 0.47*   CALCIUM 9.1 8.6 8.3*   MAGNESIUM 1.6  --  2.2   ALBUMIN 4.2 3.9  --      Estimated GFR/CRCL = Estimated Creatinine Clearance: 211.5 mL/min (A) (by C-G formula based on SCr of 0.47 mg/dL (L)).  Recent Labs     08/04/22  1723 08/05/22 0128 08/06/22 0622   GLUCOSE 111* 99 77     Recent Labs     08/04/22  1723 08/05/22  0128   ASTSGOT 21 17   ALTSGPT 22 16   TBILIRUBIN 1.7* 1.1   ALKPHOSPHAT 66 64   GLOBULIN 3.1 2.8             No results for input(s): INR, APTT, FIBRINOGEN in the last 72 hours.    Invalid input(s): DIMER      IMAGING:  CT-CTA CHEST PULMONARY ARTERY W/ RECONS   Final Result      Negative pulmonary CTA            CT-ABDOMEN-PELVIS WITH   Final Result      1.  Sigmoid diverticulitis with tiny foci of gas within the adjacent mesentery consistent with a tiny focal contained microperforation.      2.  Fatty liver.      3.  IUD present within the endometrial cavity.      4.  This was discussed with AMIRA FINNEGAN at 7:13 PM on 8/4/2022.      DX-CHEST-PORTABLE (1 VIEW)   Final Result      No evidence of acute cardiopulmonary process.          MEDS:  Current Facility-Administered Medications   Medication Last Admin   • amoxicillin-clavulanate (AUGMENTIN) 875-125 MG per tablet 1 Tablet 1 Tablet at 08/07/22 0918   • enoxaparin (Lovenox) inj 40 mg 40 mg at 08/07/22 0520   • hydrALAZINE (APRESOLINE) injection 10 mg     • ondansetron (ZOFRAN) syringe/vial injection 4 mg 4 mg at 08/07/22 0547   • ondansetron (ZOFRAN ODT) dispertab 4 mg 4 mg at 08/06/22 1629   • promethazine (PHENERGAN) tablet 12.5-25 mg     • promethazine (PHENERGAN) suppository 12.5-25 mg     • prochlorperazine (COMPAZINE) injection 5-10 mg 10 mg at 08/07/22 0924   • acetaminophen (Tylenol) tablet 650 mg     • morphine 4 MG/ML injection 2 mg 2 mg at 08/06/22 9741       ASSESSMENT/PLAN:    Problem   Rectal Bleeding   Diverticulitis of Colon With Perforation    Leukocytosis   Hypokalemia     Diverticulitis of colon with perforation  Patient with history of diverticulitis who presented for worsening abdominal pain that started yesterday.  CT abdomen pelvis showed sigmoid diverticulitis with tiny focal contained microperforation.  Patient was given ceftriaxone and Flagyl in the ER.  General surgery was consulted who recommended to continue non operative management with IV Ceftriaxone and Flagyl. Will transition patient from NPO to clear liquid diet. Will transition Augmentin 875mg TID  -Admit to medical floor  -DC patient on augmentin  -Clear liquid diet  -Tylenol and morphine as needed for pain  -Zofran as needed for nausea    Morbid obesity with BMI of 45.0-49.9, adult ([facility])  8/4 BMI 47.47    Leukocytosis  WBC 16.6. 6.5 today  Ceftriaxone / Flagyl for diverticulitis  Follow    Hypokalemia  K 3.2  K low - replace and follow  Check Mg    Leukocytosis  WBC 16.6 on admission. 13.1 today  -Repeat CBC tomorrow morning  -DC patient on augmentin    Hypokalemia  Potassium 3.2 on admission. 3.6 this AM  -s/p 40 mEq KDur   -Recheck CMP tomorrow morning    Rectal bleeding  -Reports blood when wiping. Previously had blood in toilet bowl. No blood in stool. Hgb 16.6->13.1  Ddx: 2/2 sigmoid diverticulitis with perforation, hemorrhoids  -Hgb stable  -CTM        Cande Kelley M.D.

## 2022-08-08 ENCOUNTER — PHARMACY VISIT (OUTPATIENT)
Dept: PHARMACY | Facility: MEDICAL CENTER | Age: 37
End: 2022-08-08
Payer: COMMERCIAL

## 2022-08-08 VITALS
HEART RATE: 57 BPM | OXYGEN SATURATION: 97 % | BODY MASS INDEX: 47.48 KG/M2 | DIASTOLIC BLOOD PRESSURE: 73 MMHG | SYSTOLIC BLOOD PRESSURE: 114 MMHG | RESPIRATION RATE: 18 BRPM | HEIGHT: 63 IN | WEIGHT: 268 LBS | TEMPERATURE: 97.3 F

## 2022-08-08 PROBLEM — K62.5 RECTAL BLEEDING: Status: RESOLVED | Noted: 2022-08-05 | Resolved: 2022-08-08

## 2022-08-08 PROBLEM — D72.829 LEUKOCYTOSIS: Status: RESOLVED | Noted: 2022-08-04 | Resolved: 2022-08-08

## 2022-08-08 PROBLEM — E87.6 HYPOKALEMIA: Status: RESOLVED | Noted: 2022-08-04 | Resolved: 2022-08-08

## 2022-08-08 LAB
BASOPHILS # BLD AUTO: 0.5 % (ref 0–1.8)
BASOPHILS # BLD: 0.03 K/UL (ref 0–0.12)
EOSINOPHIL # BLD AUTO: 0.2 K/UL (ref 0–0.51)
EOSINOPHIL NFR BLD: 3.2 % (ref 0–6.9)
ERYTHROCYTE [DISTWIDTH] IN BLOOD BY AUTOMATED COUNT: 41.2 FL (ref 35.9–50)
HCT VFR BLD AUTO: 37.4 % (ref 37–47)
HGB BLD-MCNC: 12.7 G/DL (ref 12–16)
IMM GRANULOCYTES # BLD AUTO: 0.03 K/UL (ref 0–0.11)
IMM GRANULOCYTES NFR BLD AUTO: 0.5 % (ref 0–0.9)
LYMPHOCYTES # BLD AUTO: 1.31 K/UL (ref 1–4.8)
LYMPHOCYTES NFR BLD: 20.9 % (ref 22–41)
MCH RBC QN AUTO: 28.9 PG (ref 27–33)
MCHC RBC AUTO-ENTMCNC: 34 G/DL (ref 33.6–35)
MCV RBC AUTO: 85.2 FL (ref 81.4–97.8)
MONOCYTES # BLD AUTO: 0.65 K/UL (ref 0–0.85)
MONOCYTES NFR BLD AUTO: 10.4 % (ref 0–13.4)
NEUTROPHILS # BLD AUTO: 4.06 K/UL (ref 2–7.15)
NEUTROPHILS NFR BLD: 64.5 % (ref 44–72)
NRBC # BLD AUTO: 0 K/UL
NRBC BLD-RTO: 0 /100 WBC
PLATELET # BLD AUTO: 330 K/UL (ref 164–446)
PMV BLD AUTO: 9 FL (ref 9–12.9)
RBC # BLD AUTO: 4.39 M/UL (ref 4.2–5.4)
WBC # BLD AUTO: 6.3 K/UL (ref 4.8–10.8)

## 2022-08-08 PROCEDURE — A9270 NON-COVERED ITEM OR SERVICE: HCPCS

## 2022-08-08 PROCEDURE — 99238 HOSP IP/OBS DSCHRG MGMT 30/<: CPT | Mod: GC | Performed by: FAMILY MEDICINE

## 2022-08-08 PROCEDURE — 85025 COMPLETE CBC W/AUTO DIFF WBC: CPT

## 2022-08-08 PROCEDURE — A9270 NON-COVERED ITEM OR SERVICE: HCPCS | Performed by: STUDENT IN AN ORGANIZED HEALTH CARE EDUCATION/TRAINING PROGRAM

## 2022-08-08 PROCEDURE — 700102 HCHG RX REV CODE 250 W/ 637 OVERRIDE(OP)

## 2022-08-08 PROCEDURE — RXMED WILLOW AMBULATORY MEDICATION CHARGE

## 2022-08-08 PROCEDURE — 700102 HCHG RX REV CODE 250 W/ 637 OVERRIDE(OP): Performed by: STUDENT IN AN ORGANIZED HEALTH CARE EDUCATION/TRAINING PROGRAM

## 2022-08-08 RX ORDER — AMOXICILLIN AND CLAVULANATE POTASSIUM 875; 125 MG/1; MG/1
1 TABLET, FILM COATED ORAL EVERY 8 HOURS
Qty: 18 TABLET | Refills: 0 | Status: SHIPPED | OUTPATIENT
Start: 2022-08-08 | End: 2022-08-14

## 2022-08-08 RX ADMIN — ACETAMINOPHEN 650 MG: 325 TABLET, FILM COATED ORAL at 13:05

## 2022-08-08 RX ADMIN — AMOXICILLIN AND CLAVULANATE POTASSIUM 1 TABLET: 875; 125 TABLET, FILM COATED ORAL at 13:19

## 2022-08-08 RX ADMIN — AMOXICILLIN AND CLAVULANATE POTASSIUM 1 TABLET: 875; 125 TABLET, FILM COATED ORAL at 05:15

## 2022-08-08 NOTE — DISCHARGE INSTRUCTIONS
Diet  Clear liquid-  some boost breeze and bone broth to help increase your caloric and protein intake    You will need to stay on a clear liquid diet until your follow-up appointment is completed with surgery and they clear you for a diet upgrade.       Discharge Instructions    Discharged to home by car with relative. Discharged via wheelchair, hospital escort: Yes.  Special equipment needed: Not Applicable    Be sure to schedule a follow-up appointment with your primary care doctor or any specialists as instructed.     Discharge Plan:   Diet Plan: Discussed  Activity Level: Discussed  Confirmed Follow up Appointment: Patient to Call and Schedule Appointment  Confirmed Symptoms Management: Discussed  Medication Reconciliation Updated: Yes    I understand that a diet low in cholesterol, fat, and sodium is recommended for good health. Unless I have been given specific instructions below for another diet, I accept this instruction as my diet prescription.   Other diet: Clear liquid     Special Instructions: None    -Is this patient being discharged with medication to prevent blood clots?  No    Is patient discharged on Warfarin / Coumadin?   No
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
## 2022-08-08 NOTE — PROGRESS NOTES
Dr. Kenny paged to clarify discharge order to see if he would like for patient to tolerate a soft diet before discharge. MD states pt is to go home on clear liquid diet until she is cleared by surgery on her follow-up appointment in 1-2 weeks. Pt given education materials on clear liquid options to increase her caloric and protein intake.

## 2022-08-08 NOTE — CARE PLAN
The patient is Stable - Low risk of patient condition declining or worsening    Shift Goals  Clinical Goals: Abx, nausea control  Patient Goals: Nausea control, Sleep  Family Goals: CAROLEE    Progress made toward(s) clinical / shift goals:    Problem: Pain - Standard  Goal: Alleviation of pain or a reduction in pain to the patient’s comfort goal  Outcome: Progressing     Problem: Knowledge Deficit - Standard  Goal: Patient and family/care givers will demonstrate understanding of plan of care, disease process/condition, diagnostic tests and medications  Outcome: Progressing

## 2022-12-29 NOTE — ED PROVIDER NOTES
ED Provider Note    CHIEF COMPLAINT  Chief Complaint   Patient presents with   • Abdominal Pain     x 4 days.    • Nausea     x 4 days       HPI  Mariza Jordan is a 35 y.o. female who presents to the emergency department complaining of diffuse abdominal pain.  Symptoms began Tuesday night and have been continuous throughout the last few days and the patient is feeling a little bit lightheaded today.  The patient has experienced diverticulitis in the past and states that her symptoms are very similar.  She does not recognize any exacerbating or alleviating factors or precipitating events    REVIEW OF SYSTEMS no fever no respiratory symptoms cough or difficulty breathing no pain or discomfort with urination no black or bloody bowel movements.  All other systems negative    PAST MEDICAL HISTORY  Past Medical History:   Diagnosis Date   • Breath shortness 01/03/2019   • Diverticulosis    • PCOS (polycystic ovarian syndrome)    • Psychiatric problem 01/03/2019    PTSD    • Sleep apnea 01/03/2019    no c pap   • Snoring        FAMILY HISTORY  History reviewed. No pertinent family history.    SOCIAL HISTORY  Social History     Socioeconomic History   • Marital status:      Spouse name: Not on file   • Number of children: Not on file   • Years of education: Not on file   • Highest education level: Not on file   Occupational History   • Not on file   Social Needs   • Financial resource strain: Not on file   • Food insecurity     Worry: Not on file     Inability: Not on file   • Transportation needs     Medical: Not on file     Non-medical: Not on file   Tobacco Use   • Smoking status: Current Every Day Smoker     Packs/day: 0.15     Types: Cigarettes   • Smokeless tobacco: Never Used   Substance and Sexual Activity   • Alcohol use: Not Currently     Comment: 1 per week   • Drug use: Yes     Types: Inhaled     Comment: marijuana/vape   • Sexual activity: Not on file   Lifestyle   • Physical activity     Days per  "week: Not on file     Minutes per session: Not on file   • Stress: Not on file   Relationships   • Social connections     Talks on phone: Not on file     Gets together: Not on file     Attends Yarsanism service: Not on file     Active member of club or organization: Not on file     Attends meetings of clubs or organizations: Not on file     Relationship status: Not on file   • Intimate partner violence     Fear of current or ex partner: Not on file     Emotionally abused: Not on file     Physically abused: Not on file     Forced sexual activity: Not on file   Other Topics Concern   • Not on file   Social History Narrative   • Not on file       SURGICAL HISTORY  Past Surgical History:   Procedure Laterality Date   • COLONOSCOPY N/A 1/9/2019    Procedure: COLONOSCOPY;  Surgeon: Graham Wadsworth M.D.;  Location: SURGERY [facility] DAY [facility];  Service: Gastroenterology   • OTHER  2012    surgery for ectopic preg   • GYN SURGERY  2007/2009    c sections   • OTHER ABDOMINAL SURGERY         CURRENT MEDICATIONS  Home Medications     Reviewed by Flakito Vidales R.N. (Registered Nurse) on 01/30/21 at 1210  Med List Status: Complete   Medication Last Dose Status   levonorgestrel (MIRENA, 52 MG,) 20 MCG/24HR IUD  Active                ALLERGIES  No Known Allergies    PHYSICAL EXAM  VITAL SIGNS: /68   Pulse 70   Temp 36.4 °C (97.6 °F) (Temporal)   Resp 16   Ht 1.6 m (5' 3\")   Wt 117 kg (257 lb 11.5 oz)   SpO2 96%   BMI 45.65 kg/m²    Oxygen saturation is interpreted as adequate  Constitutional: Pleasant individual, awake and nontoxic-appearing  Eyes: No erythema discharge or jaundice  Neck: Trachea midline no JVD  Cardiovascular: Regular rate and rhythm  Lungs: Clear and equal bilaterally with no apparent difficulty breathing  Abdomen/Back: Morbidly obese soft diffusely mildly tender but no localizable tenderness and no rebound guarding or peritoneal findings, bowel sounds are present  Skin: Warm and " dry  Musculoskeletal: No acute bony deformity  Neurologic: Awake verbal and moving all extremities without difficulty    CHART REVIEW  The patient had a CT scan done here on November 16, 2018 showing sigmoid diverticulitis versus epiploic appendagitis according to the radiology report    Laboratory  Today CBC shows white blood cell count of 13 hemoglobin is adequate at 14.2 basic metabolic panel is unremarkable LFTs and lipase are unremarkable urinalysis shows trace ketones and nitrate and leukocyte esterase are negative pregnancy test is negative    Radiology  CT-ABDOMEN-PELVIS WITH   Final Result      1.  Proximal sigmoid diverticulitis without evidence for abscess or perforation.   2.  Normal appendix.        MEDICAL DECISION MAKING and DISPOSITION  In the emergency department I offered pain medications but the patient did not feel she required any at this time.  I have reviewed all the findings with her and at this point in time I think it will be safe to provide treatment on an outpatient basis I started her on Augmentin and the first dose was given in the emergency department and have written a 10-day prescription.  I have carefully counseled the patient that if she feels she is developing new or worsening symptoms she should return immediately for recheck and otherwise she is to choose a primary care clinic and call Monday morning and arrange office recheck during the week    IMPRESSION  1.  Diverticulitis         Electronically signed by: Issa Ruvalcaba M.D., 1/30/2021 4:40 PM       This was a shared visit with the JARROD. I reviewed and verified the documentation and independently performed the documented:

## 2025-03-31 ENCOUNTER — OFFICE VISIT (OUTPATIENT)
Dept: MEDICAL GROUP | Facility: CLINIC | Age: 40
End: 2025-03-31
Payer: MEDICAID

## 2025-03-31 ENCOUNTER — PATIENT MESSAGE (OUTPATIENT)
Dept: MEDICAL GROUP | Facility: CLINIC | Age: 40
End: 2025-03-31
Payer: COMMERCIAL

## 2025-03-31 VITALS
SYSTOLIC BLOOD PRESSURE: 119 MMHG | TEMPERATURE: 97.8 F | HEIGHT: 64 IN | WEIGHT: 268 LBS | DIASTOLIC BLOOD PRESSURE: 82 MMHG | HEART RATE: 80 BPM | BODY MASS INDEX: 45.75 KG/M2 | OXYGEN SATURATION: 95 %

## 2025-03-31 DIAGNOSIS — M79.605 BILATERAL LEG PAIN: ICD-10-CM

## 2025-03-31 DIAGNOSIS — B35.4 TINEA CORPORIS: ICD-10-CM

## 2025-03-31 DIAGNOSIS — F32.2 CURRENT SEVERE EPISODE OF MAJOR DEPRESSIVE DISORDER WITHOUT PSYCHOTIC FEATURES WITHOUT PRIOR EPISODE (HCC): ICD-10-CM

## 2025-03-31 DIAGNOSIS — K57.92 DIVERTICULITIS: ICD-10-CM

## 2025-03-31 DIAGNOSIS — Z23 NEED FOR VACCINATION: ICD-10-CM

## 2025-03-31 DIAGNOSIS — M79.604 BILATERAL LEG PAIN: ICD-10-CM

## 2025-03-31 DIAGNOSIS — K76.0 FATTY LIVER: ICD-10-CM

## 2025-03-31 DIAGNOSIS — E11.9 DIABETES MELLITUS, TYPE II (HCC): ICD-10-CM

## 2025-03-31 PROCEDURE — 99203 OFFICE O/P NEW LOW 30 MIN: CPT | Mod: GE

## 2025-03-31 PROCEDURE — 3074F SYST BP LT 130 MM HG: CPT | Mod: GE

## 2025-03-31 PROCEDURE — 3079F DIAST BP 80-89 MM HG: CPT | Mod: GE

## 2025-03-31 RX ORDER — NYSTATIN 100000 U/G
1 CREAM TOPICAL 2 TIMES DAILY
Qty: 30 G | Refills: 2 | Status: SHIPPED | OUTPATIENT
Start: 2025-03-31

## 2025-03-31 RX ORDER — BUPROPION HYDROCHLORIDE 100 MG/1
100 TABLET ORAL 2 TIMES DAILY
Qty: 60 TABLET | Refills: 0 | Status: SHIPPED | OUTPATIENT
Start: 2025-03-31

## 2025-03-31 RX ORDER — ACETAMINOPHEN 325 MG/1
650 TABLET ORAL EVERY 4 HOURS PRN
Qty: 180 TABLET | Refills: 0 | Status: SHIPPED | OUTPATIENT
Start: 2025-03-31 | End: 2025-04-10 | Stop reason: SDUPTHER

## 2025-03-31 ASSESSMENT — PATIENT HEALTH QUESTIONNAIRE - PHQ9
5. POOR APPETITE OR OVEREATING: NEARLY EVERY DAY
2. FEELING DOWN, DEPRESSED, IRRITABLE, OR HOPELESS: NEARLY EVERY DAY
6. FEELING BAD ABOUT YOURSELF - OR THAT YOU ARE A FAILURE OR HAVE LET YOURSELF OR YOUR FAMILY DOWN: SEVERAL DAYS
7. TROUBLE CONCENTRATING ON THINGS, SUCH AS READING THE NEWSPAPER OR WATCHING TELEVISION: NEARLY EVERY DAY
4. FEELING TIRED OR HAVING LITTLE ENERGY: NEARLY EVERY DAY
1. LITTLE INTEREST OR PLEASURE IN DOING THINGS: NEARLY EVERY DAY
3. TROUBLE FALLING OR STAYING ASLEEP OR SLEEPING TOO MUCH: NEARLY EVERY DAY
8. MOVING OR SPEAKING SO SLOWLY THAT OTHER PEOPLE COULD HAVE NOTICED. OR THE OPPOSITE, BEING SO FIGETY OR RESTLESS THAT YOU HAVE BEEN MOVING AROUND A LOT MORE THAN USUAL: NEARLY EVERY DAY
SUM OF ALL RESPONSES TO PHQ QUESTIONS 1-9: 23
9. THOUGHTS THAT YOU WOULD BE BETTER OFF DEAD, OR OF HURTING YOURSELF: SEVERAL DAYS
SUM OF ALL RESPONSES TO PHQ9 QUESTIONS 1 AND 2: 6

## 2025-03-31 NOTE — PROGRESS NOTES
Subjective:     CC: Establish care,     HISTORY OF THE PRESENT ILLNESS: Patient is a 39 y.o. female. This pleasant patient is here today to establish care and discuss ***.     - Diverticulitis: Patient has been to the hospital multiple times for diverticulitis. Has previously been hospitlaized for sepsis secondary to this. Was referred to GI for discussion. 3/18/25 had acute flare of what she describes as her diverticulitis pain. She has nausea, stomach pain, feels unable to eat due to the nausea and pain. Started course of Augmentin due to concern for diverticulitis. Had CT Abdomen Pelvis with evidence of diverticulitis.     - Bilateral Leg Pain: {Patient reports aching pain in the legs which occurs throughout the day. Felt most when she is at rest, either sitting or lying down, and associated with leg twitching.     - Left Hand Lesion: Has tried neosporin without much help, but vaseline did help. Has been present for the past 3 years, has healed in the past but comes right back. Has in the past had red, ring-shaped lesion in the area. Also notes lesions in the cleft of the breasts and underneath her breasts. Has red, circular lesions that have shown up in the breasts.     Current Outpatient Medications Ordered in Epic   Medication Sig Dispense Refill    levonorgestrel (MIRENA) 52 mg (20 mcg/24 hr) IUD 1 Each by Intrauterine route Once.      omeprazole (PRILOSEC OTC) 20 MG tablet Take 40 mg by mouth 1 time a day as needed (Heartburn). 2 tablets = 40 mg. (Patient not taking: Reported on 3/31/2025)       No current [facility]-ordered facility-administered medications on file.       PMH: ***  PSH: ***  Meds: ***  Allergies: ***  FamHx: ***  T: ***  A: ***  D: ***  Work: ***    ROS: ***  Gen: no fevers/chills, no changes in weight  Eyes: no changes in vision  ENT: no changes in hearing  Pulm: no sob, no cough  CV: no chest pain, no palpitations  GI: no nausea/vomiting, no diarrhea  MSk: no myalgias  Skin: no rash  Neuro:  "no headaches, no numbness/tingling      Objective:       Exam: /82 (BP Location: Left arm, Patient Position: Sitting, BP Cuff Size: Large adult)   Pulse 80   Temp 36.6 °C (97.8 °F) (Temporal)   Ht 1.626 m (5' 4\")   Wt 122 kg (268 lb)   SpO2 95%  Body mass index is 46 kg/m².    General: Normal appearing. No distress.  HEENT: Normocephalic. Eyes conjunctiva clear lids without ptosis, pupils equal and reactive to light accommodation, ears normal shape and contour, ***canals are clear bilaterally, tympanic membranes are benign, nasal mucosa benign, oropharynx is without erythema, edema or exudates.   Neck: Supple without JVD or bruit***. Thyroid is not enlarged.  Pulmonary: Clear to ausculation.  Normal effort. No rales, ronchi, or wheezing.  Cardiovascular: Regular rate and rhythm without murmur. Carotid and radial pulses are intact and equal bilaterally.  Abdomen: Soft, nontender, nondistended. Normal bowel sounds. Liver and spleen are not palpable  Neurologic: Grossly nonfocal  Lymph: ***No cervical or supraclavicular lymph nodes are palpable  Skin: Warm and dry.  No obvious lesions.  Musculoskeletal: Normal gait. No obvious abnormalities.  No extremity cyanosis, clubbing, or edema.  Psych: Normal mood and affect. Alert and oriented x3. Judgment and insight is normal.    Labs: ***    Assessment & Plan:   39 y.o. female with the following -    No problem-specific Assessment & Plan notes found for this encounter.      No follow-ups on file.***    Maegan Armas M.D.   PGY-2  [facility]  " effort. No rales, ronchi, or wheezing.  Cardiovascular: Regular rate and rhythm without murmur. Carotid and radial pulses are intact and equal bilaterally.  Abdomen: Tenderness to palpation over left lower quadrant and suprapubic region. Normal bowel sounds. Liver and spleen are not palpable  Neurologic: Grossly nonfocal  Skin: Warm and dry.  No obvious lesions.  Musculoskeletal: Normal gait. No obvious abnormalities.  No extremity cyanosis, clubbing, or edema.  Psych:Tearful affect. Alert and oriented x3. Judgment and insight is normal.    Labs: Labs reviewed from [facility] ED visit    Assessment & Plan:   39 y.o. female with the following -    1. Diverticulitis  Patient with current episode of acute diverticulitis with evidence on CT A/P from recent [facility] ED visit. Started on Augmentin 10 day course. Discussed with patient importance of continuing this course and compliance with medication. Also discussed that with continued bowel rest with mostly liquid diet, pain control with tylenol, and antiemetics with Zofran as needed, would expect symptoms to resolve within the next 1-2 weeks. Will also place referral to GI for recurrent diverticulitis and possible need for repeat colonoscopy.   - acetaminophen (TYLENOL) 325 MG Tab; Take 2 Tablets by mouth every four hours as needed for Mild Pain or Moderate Pain for up to 90 days.  Dispense: 180 Tablet; Refill: 0  - Referral to Gastroenterology    2. Current severe episode of major depressive disorder without psychotic features without prior episode (HCC)  Patient with PHQ-9 score elevated at 23 and indicated passive suicidal ideation without plan. Is willing to start medication for this concern as well as referral to behavioral health. Will initiate Wellbutrin at this time. Discussed with patient risk for possible increase in suicidal ideation temporarily with start of taking this medication and provided number for suicide hotline.   - buPROPion (WELLBUTRIN) 100 MG Tab; Take 1  Tablet by mouth 2 times a day.  Dispense: 60 Tablet; Refill: 0  - Referral to behavioral health  - Return for follow up in 1 week.     3. Tinea corporis  Patient with lesions that are itchy and intermittently red in center of left palm as well as intertriginous areas of in between breasts and under left breast concerning for possible tinea corporis vs intertrigo vs hidradenitis suppurativa. Will initiate course of topical antifungal cream at this time. For left hand lesion will also have patient alternate with topical hydrocortisone every other day for possible component of eczematous reaction. Discussed avoiding prolonged use of topical steroid due to concern for skin thinning with patient.   - nystatin (MYCOSTATIN) 174157 UNIT/GM Cream topical cream; Apply 1 g topically 2 times a day.  Dispense: 30 g; Refill: 2  - hydrocortisone 1 % Cream; Apply 1 Application topically 2 times a day for 14 days.  Dispense: 20 g; Refill: 0    4. Bilateral leg pain  Will initiate workup for possible electrolyte abnormality vs thyroid dysfunction vs restless legs syndrome at this time.   - MAGNESIUM; Future  - IRON/TOTAL IRON BIND; Future  - TSH WITH REFLEX TO FT4; Future    5. Diabetes mellitus, type II (HCC)  - HEMOGLOBIN A1C; Future    HCM: patient has not had pap smear in at least 5 years. Will plan to have patient follow up for pap smear and pelvic exam at earliest convenience.     Return in about 10 days (around 4/10/2025).    Maegan Armas M.D.   PGY-2  [facility]

## 2025-04-01 RX ORDER — BENZOCAINE/MENTHOL 6 MG-10 MG
1 LOZENGE MUCOUS MEMBRANE 2 TIMES DAILY
Qty: 20 G | Refills: 0 | Status: SHIPPED | OUTPATIENT
Start: 2025-04-01 | End: 2025-04-15

## 2025-04-01 NOTE — Clinical Note
REFERRAL APPROVAL NOTICE         Sent on April 1, 2025                   Mariza Jordan  [address]  [address]  [place] [address]                   Dear Ms. Jordan,    After a careful review of the medical information and benefit coverage, [facility] has processed your referral. See below for additional details.    If applicable, you must be actively enrolled with your insurance for coverage of the authorized service. If you have any questions regarding your coverage, please contact your insurance directly.    REFERRAL INFORMATION   Referral #:  [id number]  Referred-To Department    Referred-By Provider:  Behavioral Health    Maegan Armas M.D.   Behavioral Health Outpatient      [address]  [place] [address]  [phone] [address]  [place] [id number] [phone]    Referral Start Date:  04/01/2025  Referral End Date:   04/01/2026             SCHEDULING  If you do not already have an appointment, please call [phone] to make an appointment.     MORE INFORMATION  If you do not already have a [employer] account, sign up at: [email or web address]  You can access your medical information, make appointments, see lab results, billing information, and more.  If you have questions regarding this referral, please contact  the [facility] Referrals department at:             [phone]. Monday - Friday 8:00AM - 5:00PM.     Sincerely,    [facility]

## 2025-04-01 NOTE — Clinical Note
REFERRAL APPROVAL NOTICE         Sent on April 1, 2025                   Mariza Jordan  [address]  [address]  [place] [address]                   Dear Ms. Jordan,    After a careful review of the medical information and benefit coverage, [facility] has processed your referral. See below for additional details.    If applicable, you must be actively enrolled with your insurance for coverage of the authorized service. If you have any questions regarding your coverage, please contact your insurance directly.    REFERRAL INFORMATION   Referral #:  [id number]  Referred-To Provider    Referred-By Provider:  Gastroenterology    Maegan Armas M.D.   [facility]      [address] [address] [phone] [address] [address]  [phone]    Referral Start Date:  03/31/2025  Referral End Date:   03/31/2026             SCHEDULING  If you do not already have an appointment, please call [phone] to make an appointment.     MORE INFORMATION  If you do not already have a [employer] account, sign up at: [email or web address]  You can access your medical information, make appointments, see lab results, billing information, and more.  If you have questions regarding this referral, please contact  the [facility] Referrals department at:             [phone]. Monday - Friday 8:00AM - 5:00PM.     Sincerely,    [facility]

## 2025-04-03 ENCOUNTER — APPOINTMENT (OUTPATIENT)
Dept: BEHAVIORAL HEALTH | Facility: CLINIC | Age: 40
End: 2025-04-03
Payer: MEDICAID

## 2025-04-10 ENCOUNTER — OFFICE VISIT (OUTPATIENT)
Dept: MEDICAL GROUP | Facility: CLINIC | Age: 40
End: 2025-04-10
Payer: MEDICAID

## 2025-04-10 VITALS
WEIGHT: 266.1 LBS | OXYGEN SATURATION: 93 % | TEMPERATURE: 98.4 F | HEIGHT: 64 IN | BODY MASS INDEX: 45.43 KG/M2 | HEART RATE: 106 BPM

## 2025-04-10 DIAGNOSIS — K57.92 DIVERTICULITIS: ICD-10-CM

## 2025-04-10 DIAGNOSIS — M79.604 BILATERAL LEG PAIN: ICD-10-CM

## 2025-04-10 DIAGNOSIS — Z59.819 HOUSING INSTABILITY: ICD-10-CM

## 2025-04-10 DIAGNOSIS — E11.69 TYPE 2 DIABETES MELLITUS WITH OTHER SPECIFIED COMPLICATION, UNSPECIFIED WHETHER LONG TERM INSULIN USE (HCC): ICD-10-CM

## 2025-04-10 DIAGNOSIS — G47.33 OBSTRUCTIVE SLEEP APNEA SYNDROME: ICD-10-CM

## 2025-04-10 DIAGNOSIS — M79.605 BILATERAL LEG PAIN: ICD-10-CM

## 2025-04-10 DIAGNOSIS — F33.2 SEVERE EPISODE OF RECURRENT MAJOR DEPRESSIVE DISORDER, WITHOUT PSYCHOTIC FEATURES (HCC): ICD-10-CM

## 2025-04-10 LAB
HBA1C MFR BLD: 5.3 % (ref ?–5.8)
POCT INT CON NEG: NEGATIVE
POCT INT CON POS: POSITIVE

## 2025-04-10 PROCEDURE — 83036 HEMOGLOBIN GLYCOSYLATED A1C: CPT | Mod: GC

## 2025-04-10 PROCEDURE — 99213 OFFICE O/P EST LOW 20 MIN: CPT | Mod: GE

## 2025-04-10 RX ORDER — ONDANSETRON 4 MG/1
4 TABLET, FILM COATED ORAL EVERY 4 HOURS PRN
Qty: 20 TABLET | Refills: 0 | Status: SHIPPED | OUTPATIENT
Start: 2025-04-10 | End: 2025-04-24

## 2025-04-10 RX ORDER — ACETAMINOPHEN 325 MG/1
650 TABLET ORAL EVERY 4 HOURS PRN
Qty: 180 TABLET | Refills: 0 | Status: SHIPPED | OUTPATIENT
Start: 2025-04-10 | End: 2025-07-09

## 2025-04-10 SDOH — ECONOMIC STABILITY - HOUSING INSECURITY: HOUSING INSTABILITY UNSPECIFIED: Z59.819

## 2025-04-11 ENCOUNTER — PATIENT OUTREACH (OUTPATIENT)
Dept: HEALTH INFORMATION MANAGEMENT | Facility: OTHER | Age: 40
End: 2025-04-11
Payer: MEDICAID

## 2025-04-11 SDOH — HEALTH STABILITY: PHYSICAL HEALTH: ON AVERAGE, HOW MANY MINUTES DO YOU ENGAGE IN EXERCISE AT THIS LEVEL?: 30 MIN

## 2025-04-11 SDOH — ECONOMIC STABILITY: INCOME INSECURITY: IN THE LAST 12 MONTHS, WAS THERE A TIME WHEN YOU WERE NOT ABLE TO PAY THE MORTGAGE OR RENT ON TIME?: YES

## 2025-04-11 SDOH — ECONOMIC STABILITY: FOOD INSECURITY: WITHIN THE PAST 12 MONTHS, THE FOOD YOU BOUGHT JUST DIDN'T LAST AND YOU DIDN'T HAVE MONEY TO GET MORE.: SOMETIMES TRUE

## 2025-04-11 SDOH — HEALTH STABILITY: PHYSICAL HEALTH: ON AVERAGE, HOW MANY DAYS PER WEEK DO YOU ENGAGE IN MODERATE TO STRENUOUS EXERCISE (LIKE A BRISK WALK)?: 3 DAYS

## 2025-04-11 SDOH — ECONOMIC STABILITY: FOOD INSECURITY: WITHIN THE PAST 12 MONTHS, YOU WORRIED THAT YOUR FOOD WOULD RUN OUT BEFORE YOU GOT MONEY TO BUY MORE.: SOMETIMES TRUE

## 2025-04-11 SDOH — ECONOMIC STABILITY: TRANSPORTATION INSECURITY
IN THE PAST 12 MONTHS, HAS THE LACK OF TRANSPORTATION KEPT YOU FROM MEDICAL APPOINTMENTS OR FROM GETTING MEDICATIONS?: NO

## 2025-04-11 SDOH — ECONOMIC STABILITY: TRANSPORTATION INSECURITY
IN THE PAST 12 MONTHS, HAS LACK OF TRANSPORTATION KEPT YOU FROM MEETINGS, WORK, OR FROM GETTING THINGS NEEDED FOR DAILY LIVING?: NO

## 2025-04-11 ASSESSMENT — SOCIAL DETERMINANTS OF HEALTH (SDOH)
IN A TYPICAL WEEK, HOW MANY TIMES DO YOU TALK ON THE PHONE WITH FAMILY, FRIENDS, OR NEIGHBORS?: THREE TIMES A WEEK
DO YOU BELONG TO ANY CLUBS OR ORGANIZATIONS SUCH AS CHURCH GROUPS UNIONS, FRATERNAL OR ATHLETIC GROUPS, OR SCHOOL GROUPS?: NO
HOW OFTEN DO YOU GET TOGETHER WITH FRIENDS OR RELATIVES?: THREE TIMES A WEEK
IN THE PAST 12 MONTHS, HAS THE ELECTRIC, GAS, OIL, OR WATER COMPANY THREATENED TO SHUT OFF SERVICE IN YOUR HOME?: NO
HOW OFTEN DO YOU ATTEND CHURCH OR RELIGIOUS SERVICES?: 1 TO 4 TIMES PER YEAR
HOW HARD IS IT FOR YOU TO PAY FOR THE VERY BASICS LIKE FOOD, HOUSING, MEDICAL CARE, AND HEATING?: VERY HARD

## 2025-04-11 ASSESSMENT — LIFESTYLE VARIABLES
SKIP TO QUESTIONS 9-10: 1
AUDIT-C TOTAL SCORE: 0
HOW OFTEN DO YOU HAVE SIX OR MORE DRINKS ON ONE OCCASION: NEVER
HOW OFTEN DO YOU HAVE A DRINK CONTAINING ALCOHOL: NEVER
HOW MANY STANDARD DRINKS CONTAINING ALCOHOL DO YOU HAVE ON A TYPICAL DAY: PATIENT DOES NOT DRINK

## 2025-04-11 NOTE — PROGRESS NOTES
Subjective:     CC:   Chief Complaint   Patient presents with    Follow-Up     Sleep, issues with legs following up       HPI:   Mariza presents today with:    - Major Depressive Disorder: Patient with major depressive disorder, started Wellbutrin 100mg BID at last visit 1 week ago. She states she stopped use of this after her sister in law told her there was increased use of suicidality after discontinuing use. Only took medication for 1-2 days.     - Obstructive Sleep Apnea: Patient with concern for possible sleep apnea. She states she does snore and has also been observed to have apneic events. STOP-BANG score of 5.     - Housing Instability: Patient states she was just told today that she may soon be homeless which has been causing significantly increased stress and worsening depression since last visit. Is concerned about finding a more stable source of income than her current job.       Current Outpatient Medications Ordered in Epic   Medication Sig Dispense Refill    acetaminophen (TYLENOL) 325 MG Tab Take 2 Tablets by mouth every four hours as needed for Mild Pain or Moderate Pain for up to 90 days. 180 Tablet 0    ondansetron (ZOFRAN) 4 MG Tab tablet Take 1 Tablet by mouth every four hours as needed for Nausea/Vomiting for up to 14 days. 20 Tablet 0    hydrocortisone 1 % Cream Apply 1 Application topically 2 times a day for 14 days. 20 g 0    nystatin (MYCOSTATIN) 663334 UNIT/GM Cream topical cream Apply 1 g topically 2 times a day. (Patient not taking: Reported on 4/10/2025) 30 g 2    buPROPion (WELLBUTRIN) 100 MG Tab Take 1 Tablet by mouth 2 times a day. (Patient not taking: Reported on 4/10/2025) 60 Tablet 0    omeprazole (PRILOSEC OTC) 20 MG tablet Take 40 mg by mouth 1 time a day as needed (Heartburn). 2 tablets = 40 mg. (Patient not taking: Reported on 3/31/2025)      levonorgestrel (MIRENA) 52 mg (20 mcg/24 hr) IUD 1 Each by Intrauterine route Once. (Patient not taking: Reported on 4/10/2025)    "    No current [facility]-ordered facility-administered medications on file.       ROS:  Negative except for above in HPI    Objective:     Exam:  Pulse (!) 106   Temp 36.9 °C (98.4 °F) (Temporal)   Ht 1.626 m (5' 4\")   Wt 121 kg (266 lb 1.6 oz)   SpO2 93%   BMI 45.68 kg/m²  Body mass index is 45.68 kg/m².    Gen: Alert and oriented, No apparent distress.  HEENT: NCAT, MMM, No lymphadenopathy  Lungs: Normal effort, CTA bilaterally, no wheezes, rhonchi, or rales  CV: Regular rate and rhythm. No murmurs, rubs, or gallops. Radial pulses palpable bilat  Abd: Soft, non-distended, no guarding, no rebound, non-tender to palpation  Ext: No clubbing, cyanosis, edema.  Neuro: Non-focal    Labs: No new labs    Assessment & Plan:     39 y.o. female with the following -     1. Severe episode of recurrent major depressive disorder, without psychotic features (HCC)  Patient with severe depression, now with worsening social factors including new housing instability. She tried 2 days of Wellbutrin BID but discontinued due to concern for worsening suicidal ideation after discontinuing. Gave reassurance to patient that while there may be slight risk of increased suicidality the first few weeks of use this medication should decrease risk of suicide in the long run. Also provided information for Suicide hotline and [facility].   - Continue Wellbutrin 100mg BID    2. Obstructive sleep apnea syndrome  Patient with concern for KORTNEY, STOP-BANG score of 5. Will order sleep study and referral to sleep medicine at this time.   - Referral to Pulmonary and Sleep Medicine  - Polysomnography, 4 or More; Future    3. Housing instability  Patient with new concern for housing instability. 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   - REFERRAL TO CARE MANAGEMENT    4. Type 2 diabetes mellitus with other specified complication, unspecified whether long term insulin use (HCC)  - POCT A1C wnl today at " 5.3%.    5. Diverticulitis  Patient with history of recurrent diverticulitis. Placed referral to GI at last visit, patient has received call to schedule since then but has been unable to call back to schedule. Will at this time place orders for tylenol and zofran. Patient to call once again to schedule with GI.   - acetaminophen (TYLENOL) 325 MG Tab; Take 2 Tablets by mouth every four hours as needed for Mild Pain or Moderate Pain for up to 90 days.  Dispense: 180 Tablet; Refill: 0  - ondansetron (ZOFRAN) 4 MG Tab tablet; Take 1 Tablet by mouth every four hours as needed for Nausea/Vomiting for up to 14 days.  Dispense: 20 Tablet; Refill: 0    6. Bilateral leg pain  - TSH WITH REFLEX TO FT4; Future  - IRON/TOTAL IRON BIND; Future  - MAGNESIUM; Future    Return in about 2 weeks (around 4/24/2025).    Maegan Armas M.D.  PGY-2  [facility]

## 2025-04-11 NOTE — PROGRESS NOTES
04/11/2025  CCM SW received a referral from [facility] PCP to assist pt with housing resources.  @[phone] call to the pt.  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 Pt is at risk being homeless as she can't afford current rent. Looking for housing resources.    Living Situation/Home Environment: Pt lives with her 17 and 15 y.o in an apartment, one of her children is special needs and requires assistance. Pt pays 1269 a month in rent and feels like she will be homeless soon as she doesn't make enough money to pay the rent.  Discussed housing resources.SW will e-mail resources to the pt  [facility] waiver wait list application (due by 04/24/2025)  Emergency shelter [facility]. 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 Advised to reach out to SNF or other care giving agencies for hours and full-time employment. Advised to reach out to [employer] [place] also for employment resources  Pt has [place] Medicaid for insurance. Will refer for housing assistance and case management.  Transportation: Pt has a car and active .  Support System: Pt stated she has family in the area. Her children also help. Advised to reach out to [facility] for the disabled child and [place] care to check if she can become a paid care giver.  Mental Health/Substance Abuse Hx: Pt denied alcohol or substance misuse. Pt has depression, some passive SI, no plan or means. Pt is scheduled for intake psychiatric appointment on 05/28/2025. Pt was advised to reach out to 8 or go to ED in case of mental health crisis. Pt expressed understanding.  Ability to Obtain Basic  Resources: Pt has acces to phone and internet,  Physical Functioning: No Dme is used, independent with ADLs and IADLS  Patient's Perception of Needs: Pt hopes to find less physically demanding job, stable housing.  AD Discussion?: no    Plan: 1. LAURIE will refer pto to [employer] Medicaid case management.  2. LAURIE will e-mail following resources to the pt   - [facility]  -[facility] wait list application link  -EE contact information  -[employer] [place]  -[facility]  -[facility]   -[facility]  -[facility] intake     Goal:  find stable income and housing            Barriers:  low income, physical limitation for working, limited resources  Interventions: provide resources      Start Date: 04/11/2025  Anticipated Goal Achievement Date:  04/11/2025    Social Work Care Coordinator:  Lelia Oneill  Community Care Management:  [phone]    @[email or web address] LAURIE submitted a request for [employer] case management  @[email or web address] LAURIE e-mailed resources to the pt.    @[email or web address] received an e-mail from the pt.Confirmed resources received. No further asssisance requested.  Referral is closed.

## 2025-04-15 NOTE — Clinical Note
REFERRAL APPROVAL NOTICE         Sent on April 15, 2025                   Mariza Jordan  [address]  [address]  [place] [address]                   Dear Ms. Jordan,    After a careful review of the medical information and benefit coverage, [facility] has processed your referral. See below for additional details.    If applicable, you must be actively enrolled with your insurance for coverage of the authorized service. If you have any questions regarding your coverage, please contact your insurance directly.    REFERRAL INFORMATION   Referral #:  [id number]  Referred-To Department    Referred-By Provider:  Pulmonary and Sleep Medicine    Maegan Armas M.D.   Pulmonary/sleep [facility]      [address]  [place] [address]  [phone] [address]  [place] [id number] [phone]    Referral Start Date:  04/10/2025  Referral End Date:   04/10/2026           SCHEDULING  If you do not already have an appointment, please call [phone] to make an appointment.   MORE INFORMATION  As a reminder, [facility] ownership has changed, meaning this location is now owned and operated by [facility]. As such, we want to clarify that our patients should expect to receive two separate bills for the services received at [facility] - one representing the [facility] facility fees as the owner of the establishment, and the other to represent the physician's services and subsequent fees. You can speak with your insurance carrier for a pricing estimate by calling the customer service number on the back of your card and ask about charges for a hospital outpatient visit.  If you do not already have a [employer] account, sign up at: [email or web address]  You can access your medical information, make appointments, see lab results, billing information,  and more.  If you have questions regarding this referral, please contact  the [facility] department at:             [phone]. Monday - Friday 7:30AM - 5:00PM.      Sincerely,  [facility]

## 2025-04-24 ENCOUNTER — APPOINTMENT (OUTPATIENT)
Dept: MEDICAL GROUP | Facility: CLINIC | Age: 40
End: 2025-04-24
Payer: MEDICAID

## 2025-04-28 DIAGNOSIS — G47.33 OBSTRUCTIVE SLEEP APNEA SYNDROME: ICD-10-CM

## 2025-04-29 NOTE — Clinical Note
REFERRAL APPROVAL NOTICE         Sent on April 29, 2025                   Mariza Jordan  [address]  [address]  [place] [address]                   Dear Ms. Jordan,    After a careful review of the medical information and benefit coverage, [facility] has processed your referral. See below for additional details.    If applicable, you must be actively enrolled with your insurance for coverage of the authorized service. If you have any questions regarding your coverage, please contact your insurance directly.    REFERRAL INFORMATION   Referral #:  [id number]  Referred-To Department    Referred-By Provider:  Pulmonary and Sleep Medicine    Maegan Armas M.D.   P[facility]      [address]  [place] [address]  [phone] [address]  [address]  [place] [id number] [phone]    Referral Start Date:  04/28/2025  Referral End Date:   04/28/2026             SCHEDULING  If you do not already have an appointment, please call [phone] to make an appointment.     MORE INFORMATION  If you do not already have a [employer] account, sign up at: [email or web address]  You can access your medical information, make appointments, see lab results, billing information, and more.  If you have questions regarding this referral, please contact  the [facility] Referrals department at:             [phone]. Monday - Friday 8:00AM - 5:00PM.     Sincerely,    [facility]

## 2025-05-06 ENCOUNTER — APPOINTMENT (OUTPATIENT)
Dept: MEDICAL GROUP | Facility: CLINIC | Age: 40
End: 2025-05-06
Payer: MEDICAID

## 2025-05-09 ENCOUNTER — TELEPHONE (OUTPATIENT)
Dept: HEALTH INFORMATION MANAGEMENT | Facility: OTHER | Age: 40
End: 2025-05-09
Payer: MEDICAID

## (undated) DEVICE — CONTAINER, SPECIMEN, STERILE

## (undated) DEVICE — SPONGE GAUZE NON-STERILE 4X4 - (2000/CA 10PK/CA)

## (undated) DEVICE — KIT CUSTOM PROCEDURE SINGLE FOR ENDO  (15/CA)

## (undated) DEVICE — TUBE NG SALEM SUMP 14FR (50EA/BX)

## (undated) DEVICE — TUBE CONNECTING SUCTION - CLEAR PLASTIC STERILE 72 IN (50EA/CA)

## (undated) DEVICE — MANIFOLD NEPTUNE 1 PORT (20/PK)

## (undated) DEVICE — CHLORAPREP 26 ML APPLICATOR - ORANGE TINT(25/CA)

## (undated) DEVICE — BASIN EMESIS DISP. - (250/CA)

## (undated) DEVICE — CATHETER IV 20 GA X 1-1/4 ---SURG.& SDS ONLY--- (50EA/BX)

## (undated) DEVICE — NEPTUNE 4 PORT MANIFOLD - (20/PK)

## (undated) DEVICE — CANISTER SUCTION RIGID RED 1500CC (40EA/CA)

## (undated) DEVICE — GOWN SURGEONS X-LARGE - DISP. (30/CA)

## (undated) DEVICE — CANISTER SUCTION 3000ML MECHANICAL FILTER AUTO SHUTOFF MEDI-VAC NONSTERILE LF DISP  (40EA/CA)